# Patient Record
Sex: FEMALE | Race: BLACK OR AFRICAN AMERICAN | NOT HISPANIC OR LATINO | Employment: OTHER | URBAN - METROPOLITAN AREA
[De-identification: names, ages, dates, MRNs, and addresses within clinical notes are randomized per-mention and may not be internally consistent; named-entity substitution may affect disease eponyms.]

---

## 2017-01-04 ENCOUNTER — OFFICE VISIT (OUTPATIENT)
Dept: INTERNAL MEDICINE | Facility: CLINIC | Age: 72
End: 2017-01-04
Payer: MEDICARE

## 2017-01-04 DIAGNOSIS — N18.6 TYPE 2 DIABETES MELLITUS WITH CHRONIC KIDNEY DISEASE ON CHRONIC DIALYSIS, WITHOUT LONG-TERM CURRENT USE OF INSULIN: ICD-10-CM

## 2017-01-04 DIAGNOSIS — I12.0: Primary | ICD-10-CM

## 2017-01-04 DIAGNOSIS — Z99.2 TYPE 2 DIABETES MELLITUS WITH CHRONIC KIDNEY DISEASE ON CHRONIC DIALYSIS, WITHOUT LONG-TERM CURRENT USE OF INSULIN: ICD-10-CM

## 2017-01-04 DIAGNOSIS — E11.22 TYPE 2 DIABETES MELLITUS WITH CHRONIC KIDNEY DISEASE ON CHRONIC DIALYSIS, WITHOUT LONG-TERM CURRENT USE OF INSULIN: ICD-10-CM

## 2017-01-04 PROCEDURE — 99999 PR PBB SHADOW E&M-EST. PATIENT-LVL IV: CPT | Mod: PBBFAC,,, | Performed by: INTERNAL MEDICINE

## 2017-01-04 PROCEDURE — 3066F NEPHROPATHY DOC TX: CPT | Mod: S$GLB,,, | Performed by: INTERNAL MEDICINE

## 2017-01-04 PROCEDURE — 1157F ADVNC CARE PLAN IN RCRD: CPT | Mod: S$GLB,,, | Performed by: INTERNAL MEDICINE

## 2017-01-04 PROCEDURE — 99499 UNLISTED E&M SERVICE: CPT | Mod: S$GLB,,, | Performed by: INTERNAL MEDICINE

## 2017-01-04 PROCEDURE — 1126F AMNT PAIN NOTED NONE PRSNT: CPT | Mod: S$GLB,,, | Performed by: INTERNAL MEDICINE

## 2017-01-04 PROCEDURE — 99215 OFFICE O/P EST HI 40 MIN: CPT | Mod: S$GLB,,, | Performed by: INTERNAL MEDICINE

## 2017-01-04 PROCEDURE — 1160F RVW MEDS BY RX/DR IN RCRD: CPT | Mod: S$GLB,,, | Performed by: INTERNAL MEDICINE

## 2017-01-04 PROCEDURE — 2022F DILAT RTA XM EVC RTNOPTHY: CPT | Mod: S$GLB,,, | Performed by: INTERNAL MEDICINE

## 2017-01-04 PROCEDURE — 3044F HG A1C LEVEL LT 7.0%: CPT | Mod: S$GLB,,, | Performed by: INTERNAL MEDICINE

## 2017-01-04 PROCEDURE — 1159F MED LIST DOCD IN RCRD: CPT | Mod: S$GLB,,, | Performed by: INTERNAL MEDICINE

## 2017-01-04 NOTE — MR AVS SNAPSHOT
[facility] - Internal Medicine  [address]  [facility] [address]  Phone: [phone]  Fax: [phone]                  Beth Salcedo   2017 1:00 PM   Office Visit    Description:  Female : 1945   Provider:  Emili Sanchez MD   Department:  [facility] - Internal Medicine           Reason for Visit     Follow-up           Diagnoses this Visit        Comments    Unspecified hypertensive kidney disease with chronic kidney disease stage V or end stage renal disease    -  Primary            To Do List           Goals (5 Years of Data)     None      [facility]     [facility] Care [facility] -  Assistance  Registered nurses in the [facility] On Call Center provide clinical advisement, health education, appointment booking, and other advisory services.  Call for this free service at [phone].             Medications           Message regarding Medications     Verify the changes and/or additions to your medication regime listed below are the same as discussed with your clinician today.  If any of these changes or additions are incorrect, please notify your healthcare provider.             Verify that the below list of medications is an accurate representation of the medications you are currently taking.  If none reported, the list may be blank. If incorrect, please contact your healthcare provider. Carry this list with you in case of emergency.           Current Medications     amlodipine (NORVASC) 10 MG tablet TAKE ONE DAILY ***GENERIC NORVASC    aspirin 81 mg Tab Take 1 tablet by mouth Daily.    blood sugar diagnostic Strp Test 3 times daily. Please dispense 1 meter covered by insurance. Meter, Strips, and lancets    calcitRIOL (ROCALTROL) 0.25 MCG Cap Take 1 capsule (0.25 mcg total) by mouth once daily.    calcium carbonate (OS-HILLARY) 500 mg calcium (1,250 mg) tablet Take 1 tablet (500 mg total) by mouth 3 (three) times daily with meals.    carvedilol (COREG) 12.5 MG  tablet Take 1 tablet (12.5 mg total) by mouth 2 (two) times daily.    isosorbide mononitrate (IMDUR) 30 MG 24 hr tablet Take 1 tablet (30 mg total) by mouth once daily.    sevelamer carbonate (RENVELA) 800 mg Tab Take 1 tablet (800 mg total) by mouth 3 (three) times daily with meals.    sodium bicarbonate 650 MG tablet Take 2 tablets (1,300 mg total) by mouth 2 (two) times daily.    venlafaxine (EFFEXOR) 25 MG Tab Take 1 tablet (25 mg total) by mouth once daily.    VITAMIN D2 50,000 unit capsule ONE EVERY WEEK    ZETIA 10 mg tablet TAKE ONE DAILY OR AS DIRECTED    cloNIDine (CATAPRES) 0.1 MG tablet Take 1 tablet (0.1 mg total) by mouth 3 (three) times daily.    furosemide (LASIX) 20 MG tablet Take 1 tablet (20 mg total) by mouth once daily.           Clinical Reference Information           Vital Signs - Last Recorded  Most recent update: 1/4/2017  1:11 PM by Bakari Hinojosa MA    BP Pulse Ht Wt LMP BMI    (!) 160/50 (!) 58 5' (1.524 m) 59 kg (130 lb) (LMP Unknown) 25.39 kg/m2      Blood Pressure          Most Recent Value    BP  (!)  160/50      Allergies as of 1/4/2017     Hydralazine    Ramipril    Simvastatin      Immunizations Administered on Date of Encounter - 1/4/2017     None      Orders Placed During Today's Visit      Normal Orders This Visit    Ambulatory Referral to Cardiology       Maintenance Dialysis History     Start End Type Comments Center    6/10/2016  [place]  [facility]            Current Dialysis Center Information     [facility] [address] Phone #:  [phone]    Contact:  N/A KARINA Krishnamurthy  [id number] Fax #:  [phone]

## 2017-01-08 VITALS
SYSTOLIC BLOOD PRESSURE: 138 MMHG | BODY MASS INDEX: 25.52 KG/M2 | HEIGHT: 60 IN | HEART RATE: 64 BPM | WEIGHT: 130 LBS | DIASTOLIC BLOOD PRESSURE: 82 MMHG

## 2017-01-08 NOTE — PROGRESS NOTES
Subjective:       Patient ID: Beth Salcedo is a 71 y.o. female.    Chief Complaint: Hypertension    HPI: She returns for management of hypertension.  She has had hypertension for over a year.  Current treatment has included medications outlined in medication list.  She denies chest pain or shortness of breath.  No palpitations.  Denies left arm or neck pain.  She has diabetes.  Denies polyuria, polydipsia  Review of Systems   Constitutional: Negative for chills, fatigue, fever and unexpected weight change.   Respiratory: Negative for chest tightness and shortness of breath.    Cardiovascular: Negative for chest pain and palpitations.   Gastrointestinal: Negative for abdominal pain and blood in stool.   Neurological: Negative for dizziness, syncope, numbness and headaches.       Objective:      Physical Exam   HENT:   Right Ear: External ear normal.   Left Ear: External ear normal.   Nose: Nose normal.   Mouth/Throat: Oropharynx is clear and moist.   Eyes: Pupils are equal, round, and reactive to light.   Neck: Normal range of motion.   Cardiovascular: Normal rate and regular rhythm.    No murmur heard.  Pulmonary/Chest: Breath sounds normal.   Abdominal: She exhibits no distension. There is no hepatosplenomegaly. There is no tenderness.   Lymphadenopathy:     She has no cervical adenopathy.     She has no axillary adenopathy.   Neurological: She has normal strength and normal reflexes. No cranial nerve deficit or sensory deficit.       Assessment:     assessment and plan: 1.  Hypertension: She reports having her eye exam within the past year  2.  Diabetes: Discussed podiatry appointment.  She refused  3.  Discussed colonoscopy, Pap smear, pelvic exam, rectal exam.  She refused all      Plan:       As above

## 2017-01-10 PROBLEM — E87.79 HYPERTENSION WITH HYPERVOLEMIA: Status: ACTIVE | Noted: 2017-01-10

## 2017-01-10 PROBLEM — I10 HYPERTENSION WITH HYPERVOLEMIA: Status: ACTIVE | Noted: 2017-01-10

## 2017-01-10 RX ORDER — AMLODIPINE BESYLATE 10 MG/1
TABLET ORAL
Qty: 30 TABLET | Refills: 6 | Status: SHIPPED | OUTPATIENT
Start: 2017-01-10 | End: 2017-10-03 | Stop reason: SDUPTHER

## 2017-01-15 PROBLEM — I10 HYPERTENSION WITH HYPERVOLEMIA: Status: ACTIVE | Noted: 2017-01-15

## 2017-01-15 PROBLEM — E87.79 HYPERTENSION WITH HYPERVOLEMIA: Status: ACTIVE | Noted: 2017-01-15

## 2017-01-20 ENCOUNTER — TELEPHONE (OUTPATIENT)
Dept: VASCULAR SURGERY | Facility: CLINIC | Age: 72
End: 2017-01-20

## 2017-01-20 NOTE — TELEPHONE ENCOUNTER
----- Message from Camron Duvall sent at 1/20/2017 10:03 AM CST -----  Pt needs appt and would like it for next Friday if possible. Please call pt regarding this at [phone]

## 2017-01-27 ENCOUNTER — TELEPHONE (OUTPATIENT)
Dept: VASCULAR SURGERY | Facility: CLINIC | Age: 72
End: 2017-01-27

## 2017-01-27 NOTE — TELEPHONE ENCOUNTER
----- Message from Camron Duvall sent at 1/27/2017 10:25 AM CST -----  Pt was unaware she missed phone call to schedule appt. Please call pt to schedule appt at [phone]

## 2017-01-30 ENCOUNTER — OFFICE VISIT (OUTPATIENT)
Dept: CARDIOLOGY | Facility: CLINIC | Age: 72
End: 2017-01-30
Payer: MEDICARE

## 2017-01-30 ENCOUNTER — TELEPHONE (OUTPATIENT)
Dept: CARDIOLOGY | Facility: CLINIC | Age: 72
End: 2017-01-30

## 2017-01-30 ENCOUNTER — TELEPHONE (OUTPATIENT)
Dept: PHARMACY | Facility: CLINIC | Age: 72
End: 2017-01-30

## 2017-01-30 VITALS
SYSTOLIC BLOOD PRESSURE: 192 MMHG | DIASTOLIC BLOOD PRESSURE: 84 MMHG | HEART RATE: 64 BPM | WEIGHT: 131.38 LBS | HEIGHT: 60 IN | BODY MASS INDEX: 25.79 KG/M2

## 2017-01-30 DIAGNOSIS — N18.6 TYPE 2 DIABETES MELLITUS WITH CHRONIC KIDNEY DISEASE ON CHRONIC DIALYSIS, WITHOUT LONG-TERM CURRENT USE OF INSULIN: ICD-10-CM

## 2017-01-30 DIAGNOSIS — Z99.2 ESRD (END STAGE RENAL DISEASE) ON DIALYSIS: Chronic | ICD-10-CM

## 2017-01-30 DIAGNOSIS — I51.7 LVH (LEFT VENTRICULAR HYPERTROPHY): ICD-10-CM

## 2017-01-30 DIAGNOSIS — I50.32 CHRONIC DIASTOLIC HEART FAILURE: Chronic | ICD-10-CM

## 2017-01-30 DIAGNOSIS — N18.5 ANEMIA OF CHRONIC RENAL FAILURE, STAGE 5: Chronic | ICD-10-CM

## 2017-01-30 DIAGNOSIS — N18.6 ESRD (END STAGE RENAL DISEASE) ON DIALYSIS: Chronic | ICD-10-CM

## 2017-01-30 DIAGNOSIS — D63.1 ANEMIA OF CHRONIC RENAL FAILURE, STAGE 5: Chronic | ICD-10-CM

## 2017-01-30 DIAGNOSIS — Z99.2 TYPE 2 DIABETES MELLITUS WITH CHRONIC KIDNEY DISEASE ON CHRONIC DIALYSIS, WITHOUT LONG-TERM CURRENT USE OF INSULIN: ICD-10-CM

## 2017-01-30 DIAGNOSIS — E11.22 TYPE 2 DIABETES MELLITUS WITH CHRONIC KIDNEY DISEASE ON CHRONIC DIALYSIS, WITHOUT LONG-TERM CURRENT USE OF INSULIN: ICD-10-CM

## 2017-01-30 DIAGNOSIS — E78.2 MIXED HYPERLIPIDEMIA: ICD-10-CM

## 2017-01-30 DIAGNOSIS — I70.0 ATHEROSCLEROSIS OF AORTA: ICD-10-CM

## 2017-01-30 DIAGNOSIS — I10 ESSENTIAL HYPERTENSION: Primary | Chronic | ICD-10-CM

## 2017-01-30 PROCEDURE — 3044F HG A1C LEVEL LT 7.0%: CPT | Mod: S$GLB,,, | Performed by: INTERNAL MEDICINE

## 2017-01-30 PROCEDURE — 2022F DILAT RTA XM EVC RTNOPTHY: CPT | Mod: S$GLB,,, | Performed by: INTERNAL MEDICINE

## 2017-01-30 PROCEDURE — 1160F RVW MEDS BY RX/DR IN RCRD: CPT | Mod: S$GLB,,, | Performed by: INTERNAL MEDICINE

## 2017-01-30 PROCEDURE — 99499 UNLISTED E&M SERVICE: CPT | Mod: S$GLB,,, | Performed by: INTERNAL MEDICINE

## 2017-01-30 PROCEDURE — 99214 OFFICE O/P EST MOD 30 MIN: CPT | Mod: S$GLB,,, | Performed by: INTERNAL MEDICINE

## 2017-01-30 PROCEDURE — 1159F MED LIST DOCD IN RCRD: CPT | Mod: S$GLB,,, | Performed by: INTERNAL MEDICINE

## 2017-01-30 PROCEDURE — 3066F NEPHROPATHY DOC TX: CPT | Mod: S$GLB,,, | Performed by: INTERNAL MEDICINE

## 2017-01-30 PROCEDURE — 1157F ADVNC CARE PLAN IN RCRD: CPT | Mod: S$GLB,,, | Performed by: INTERNAL MEDICINE

## 2017-01-30 PROCEDURE — 99999 PR PBB SHADOW E&M-EST. PATIENT-LVL III: CPT | Mod: PBBFAC,,, | Performed by: INTERNAL MEDICINE

## 2017-01-30 PROCEDURE — 1126F AMNT PAIN NOTED NONE PRSNT: CPT | Mod: S$GLB,,, | Performed by: INTERNAL MEDICINE

## 2017-01-30 RX ORDER — CLONIDINE 0.2 MG/24H
1 PATCH, EXTENDED RELEASE TRANSDERMAL
Qty: 4 PATCH | Refills: 11 | Status: SHIPPED | OUTPATIENT
Start: 2017-01-30 | End: 2017-07-05 | Stop reason: DRUGHIGH

## 2017-01-30 RX ORDER — LOSARTAN POTASSIUM 100 MG/1
1 TABLET ORAL DAILY
COMMUNITY
Start: 2017-01-10 | End: 2017-03-09 | Stop reason: SDUPTHER

## 2017-01-30 RX ORDER — FUROSEMIDE 80 MG/1
1 TABLET ORAL 2 TIMES DAILY
COMMUNITY
Start: 2017-01-24 | End: 2017-07-05

## 2017-01-30 NOTE — PROGRESS NOTES
"Subjective:   Patient ID:  Beth Salcedo is a 71 y.o. female who presents for follow up of Hypertension      HPI: Here for 4 month f/u.  She finds that her BP is much better at home (140-160 systolic) but gets "white coat syndrome" according to her.      She feels well and denies chest discomfort, MENDEZ, palpitations, PND/orthopnea, lightheadedness and syncope.    She's interested in home dialysis.    Sept 2016 HPI: Very pleasant woman with ESRD on HD who has difficult to control HTN. She has no history of CAD or MI though she does have hypertensive heart disease with severe LA enlargement.     BPs have mostly been high normal to about what she is today lately except for occasional spikes at the end of dialysis. This is managed with PRN clonidine.     She denies angina, new shortness of breath, lightheadedness, or syncope.    Patient Active Problem List   Diagnosis    Mixed hyperlipidemia    Cataract, bilateral    Type II diabetes mellitus with renal manifestations    Atherosclerosis of aorta    Anemia of chronic renal failure, stage 5    Hypertensive urgency    ESRD (end stage renal disease) on dialysis    Essential hypertension    Secondary hyperparathyroidism of renal origin    LVH (left ventricular hypertrophy)    Diastolic heart failure    Hemodialysis access, AV graft    Hypertension with hypervolemia       Current Outpatient Prescriptions   Medication Sig    amlodipine (NORVASC) 10 MG tablet TAKE ONE DAILY GENERIC NORVASC    aspirin 81 mg Tab Take 1 tablet by mouth Daily.    blood sugar diagnostic Strp Test 3 times daily. Please dispense 1 meter covered by insurance. Meter, Strips, and lancets    calcitRIOL (ROCALTROL) 0.25 MCG Cap Take 1 capsule (0.25 mcg total) by mouth once daily.    calcium carbonate (OS-HILLARY) 500 mg calcium (1,250 mg) tablet Take 1 tablet (500 mg total) by mouth 3 (three) times daily with meals.    carvedilol (COREG) 12.5 MG tablet Take 1 tablet (12.5 mg total) by " mouth 2 (two) times daily.    cloNIDine (CATAPRES) 0.1 MG tablet Take 1 tablet (0.1 mg total) by mouth 3 (three) times daily.    furosemide (LASIX) 80 MG tablet Take 1 tablet by mouth 2 (two) times daily.    isosorbide mononitrate (IMDUR) 30 MG 24 hr tablet Take 1 tablet (30 mg total) by mouth once daily.    losartan (COZAAR) 100 MG tablet Take 1 tablet by mouth once daily.    sevelamer carbonate (RENVELA) 800 mg Tab Take 1 tablet (800 mg total) by mouth 3 (three) times daily with meals.    venlafaxine (EFFEXOR) 25 MG Tab Take 1 tablet (25 mg total) by mouth once daily.    VITAMIN D2 50,000 unit capsule ONE EVERY WEEK (Patient taking differently: PT TAKEN EVERY TUES,THUR ,SAT)    sodium bicarbonate 650 MG tablet Take 2 tablets (1,300 mg total) by mouth 2 (two) times daily.     No current facility-administered medications for this visit.        Review of Systems   Constitution: Negative.   HENT: Negative.    Eyes: Negative.    Cardiovascular: Negative.  Negative for chest pain, dyspnea on exertion, near-syncope, orthopnea and palpitations.   Respiratory: Negative.  Negative for cough, hemoptysis and shortness of breath.    Endocrine: Negative.    Hematologic/Lymphatic: Negative.    Skin: Negative.    Musculoskeletal: Negative.    Gastrointestinal: Negative.    Genitourinary: Negative.    Neurological: Negative.    Psychiatric/Behavioral: Negative.      Objective:   Physical Exam   Constitutional: She is oriented to person, place, and time. She appears well-developed and well-nourished.   HENT:   Head: Normocephalic and atraumatic.   Mouth/Throat: Oropharynx is clear and moist.   Eyes: Conjunctivae and EOM are normal. No scleral icterus.   Neck: Normal range of motion. Neck supple. No JVD present.   Cardiovascular: Normal rate, regular rhythm, normal heart sounds and intact distal pulses.  Exam reveals no gallop and no friction rub.    No murmur heard.  Pulmonary/Chest: Effort normal and breath sounds normal.  She has no wheezes. She has no rales.   Abdominal: Soft. Bowel sounds are normal. She exhibits no distension. There is no tenderness.   Musculoskeletal: Normal range of motion. She exhibits no edema.   Neurological: She is alert and oriented to person, place, and time.   Skin: Skin is warm and dry. No rash noted. No erythema.   Psychiatric: She has a normal mood and affect. Her behavior is normal. Judgment and thought content normal.   Vitals reviewed.      Lab Results   Component Value Date    WBC 5.66 12/16/2016    HGB 9.3 (L) 12/16/2016    HCT 28.3 (L) 12/16/2016    MCV 98 12/16/2016     12/16/2016         Chemistry        Component Value Date/Time     12/16/2016 0439    K 5.0 12/16/2016 0439     12/16/2016 0439    CO2 24 12/16/2016 0439    BUN 36 (H) 12/16/2016 0439    CREATININE 4.8 (H) 12/16/2016 0439     (H) 12/16/2016 0439        Component Value Date/Time    CALCIUM 9.3 12/16/2016 0439    ALKPHOS 67 12/16/2016 0439    AST 11 12/16/2016 0439    ALT 16 12/16/2016 0439    BILITOT 0.4 12/16/2016 0439            Lab Results   Component Value Date    CHOL 294 (H) 08/19/2015    CHOL 342 (H) 10/04/2014    CHOL 173 03/30/2013     Lab Results   Component Value Date    HDL 42 08/19/2015    HDL 41 10/04/2014    HDL 41 03/30/2013     Lab Results   Component Value Date    LDLCALC 216.4 (H) 08/19/2015    LDLCALC 257.2 (H) 10/04/2014    LDLCALC 117.0 03/30/2013     Lab Results   Component Value Date    TRIG 178 (H) 08/19/2015    TRIG 219 (H) 10/04/2014    TRIG 74 03/30/2013     Lab Results   Component Value Date    CHOLHDL 14.3 (L) 08/19/2015    CHOLHDL 12.0 (L) 10/04/2014    CHOLHDL 23.7 03/30/2013       Lab Results   Component Value Date    TSH 3.860 12/14/2016       Lab Results   Component Value Date    HGBA1C 6.4 (H) 12/14/2016       Assessment:     1. Essential hypertension    2. Chronic diastolic heart failure    3. Mixed hyperlipidemia    4. Atherosclerosis of aorta    5. LVH (left  ventricular hypertrophy)    6. Anemia of chronic renal failure, stage 5    7. ESRD (end stage renal disease) on dialysis    8. Type 2 diabetes mellitus with chronic kidney disease on chronic dialysis, without long-term current use of insulin    9.      Statin intolerance - true statin intolerance    Plan:     Stop PO clonidine and start Clonidine 0.2 patch.    Start Repatha for markedly elevated LDL and true statin intolerance.    Otherwise, continue current medicines.    Diet/exercise goals reinforced.    F/U 4 months with lipid panel

## 2017-01-30 NOTE — TELEPHONE ENCOUNTER
Natalie with [facility] notified to remove Lasix 20mg, Zetia 10mg and Catapres 0.1mg tablet prescriptions from pt's profile. Pt no longer taking these medications.

## 2017-01-30 NOTE — MR AVS SNAPSHOT
[place] - Cardiology   [address]  [place] [address]  Phone: [phone]                  Beth Salcedo   2017 9:00 AM   Office Visit    Description:  Female : 1945   Provider:  Wilian Salas MD   Department:  [place] - Cardiology           Reason for Visit     Hypertension                To Do List           Future Appointments        Provider Department Dept Phone    2/3/2017 7:30 AM VASCULAR, LAB [facility] Vascular Laboratory [phone]    2/3/2017 9:05 AM CHARLI Rey III, MD [facility] - Vascular Surgery [phone]    2/3/2017 11:00 AM VASCULAR, LAB [facility] Vascular Laboratory [phone]      Goals (5 Years of Data)     None      [facility]     [facility] Care Line -  Assistance  Registered nurses in the [facility] On Call Center provide clinical advisement, health education, appointment booking, and other advisory services.  Call for this free service at [phone].             Medications           Message regarding Medications     Verify the changes and/or additions to your medication regime listed below are the same as discussed with your clinician today.  If any of these changes or additions are incorrect, please notify your healthcare provider.        STOP taking these medications     ZETIA 10 mg tablet TAKE ONE DAILY OR AS DIRECTED           Verify that the below list of medications is an accurate representation of the medications you are currently taking.  If none reported, the list may be blank. If incorrect, please contact your healthcare provider. Carry this list with you in case of emergency.           Current Medications     amlodipine (NORVASC) 10 MG tablet TAKE ONE DAILY ***GENERIC NORVASC    aspirin 81 mg Tab Take 1 tablet by mouth Daily.    blood sugar diagnostic Strp Test 3 times daily. Please dispense 1 meter covered by insurance. Meter, Strips, and lancets    calcitRIOL (ROCALTROL) 0.25 MCG Cap Take 1 capsule  (0.25 mcg total) by mouth once daily.    calcium carbonate (OS-HILLARY) 500 mg calcium (1,250 mg) tablet Take 1 tablet (500 mg total) by mouth 3 (three) times daily with meals.    carvedilol (COREG) 12.5 MG tablet Take 1 tablet (12.5 mg total) by mouth 2 (two) times daily.    cloNIDine (CATAPRES) 0.1 MG tablet Take 1 tablet (0.1 mg total) by mouth 3 (three) times daily.    furosemide (LASIX) 80 MG tablet Take 1 tablet by mouth 2 (two) times daily.    isosorbide mononitrate (IMDUR) 30 MG 24 hr tablet Take 1 tablet (30 mg total) by mouth once daily.    losartan (COZAAR) 100 MG tablet Take 1 tablet by mouth once daily.    sevelamer carbonate (RENVELA) 800 mg Tab Take 1 tablet (800 mg total) by mouth 3 (three) times daily with meals.    sodium bicarbonate 650 MG tablet Take 2 tablets (1,300 mg total) by mouth 2 (two) times daily.    venlafaxine (EFFEXOR) 25 MG Tab Take 1 tablet (25 mg total) by mouth once daily.    VITAMIN D2 50,000 unit capsule ONE EVERY WEEK           Clinical Reference Information           Vital Signs - Last Recorded  Most recent update: 1/30/2017  9:16 AM by Mirian Weir LPN    BP Pulse Ht Wt LMP BMI    (!) 192/84 64 5' (1.524 m) 59.6 kg (131 lb 6.3 oz) (LMP Unknown) 25.66 kg/m2      Blood Pressure          Most Recent Value    BP  (!)  192/84      Allergies as of 1/30/2017     Hydralazine    Ramipril    Simvastatin      Immunizations Administered on Date of Encounter - 1/30/2017     None      Maintenance Dialysis History     Start End Type Comments Center    6/10/2016  [place]  [facility]            Current Dialysis Center Information     [facility] [address] Phone #:  [phone]    Contact:  N/KARINA Carmen  [address] Fax #:  [phone]

## 2017-01-30 NOTE — LETTER
January 30, 2017      Emili Sanchez MD  [address]  [facility] [address]           [place] - Cardiology  [address] [place] [address]  Phone: [phone]          Patient: Beth Salcedo   MR Number: [id number]   YOB: 1945   Date of Visit: 1/30/2017       Dear Dr. Emili Sanchez:    Thank you for referring Beth Salcedo to me for evaluation. Attached you will find relevant portions of my assessment and plan of care.    If you have questions, please do not hesitate to call me. I look forward to following Beth Salcedo along with you.    Sincerely,    Wilian Salas MD    Enclosure  CC:  No Recipients    If you would like to receive this communication electronically, please contact [email or web address] or [phone] to request more information on [employer] access.    For providers and/or their staff who would like to refer a patient to [facility], please contact us through our one-stop-shop provider referral line, [facility], at [phone].    If you feel you have received this communication in error or would no longer like to receive these types of communications, please e-mail [email or web address]

## 2017-02-03 ENCOUNTER — HOSPITAL ENCOUNTER (OUTPATIENT)
Dept: VASCULAR SURGERY | Facility: CLINIC | Age: 72
Discharge: HOME | End: 2017-02-03
Attending: SURGERY
Payer: MEDICARE

## 2017-02-03 ENCOUNTER — OFFICE VISIT (OUTPATIENT)
Dept: VASCULAR SURGERY | Facility: CLINIC | Age: 72
End: 2017-02-03
Payer: MEDICARE

## 2017-02-03 VITALS
BODY MASS INDEX: 24.91 KG/M2 | SYSTOLIC BLOOD PRESSURE: 214 MMHG | DIASTOLIC BLOOD PRESSURE: 87 MMHG | HEART RATE: 58 BPM | TEMPERATURE: 98 F | WEIGHT: 126.88 LBS | HEIGHT: 60 IN

## 2017-02-03 DIAGNOSIS — I87.2 VENOUS INSUFFICIENCY OF BOTH LOWER EXTREMITIES: ICD-10-CM

## 2017-02-03 DIAGNOSIS — I77.9 PAOD (PERIPHERAL ARTERIAL OCCLUSIVE DISEASE): ICD-10-CM

## 2017-02-03 DIAGNOSIS — N18.6 ESRD (END STAGE RENAL DISEASE) ON DIALYSIS: Primary | Chronic | ICD-10-CM

## 2017-02-03 DIAGNOSIS — I70.0 ATHEROSCLEROSIS OF AORTA: ICD-10-CM

## 2017-02-03 DIAGNOSIS — Z99.2 ESRD (END STAGE RENAL DISEASE) ON DIALYSIS: Primary | Chronic | ICD-10-CM

## 2017-02-03 PROCEDURE — 99999 PR PBB SHADOW E&M-EST. PATIENT-LVL III: CPT | Mod: PBBFAC,,, | Performed by: SURGERY

## 2017-02-03 PROCEDURE — 1160F RVW MEDS BY RX/DR IN RCRD: CPT | Mod: S$GLB,,, | Performed by: SURGERY

## 2017-02-03 PROCEDURE — 99214 OFFICE O/P EST MOD 30 MIN: CPT | Mod: 24,S$GLB,, | Performed by: SURGERY

## 2017-02-03 PROCEDURE — 1126F AMNT PAIN NOTED NONE PRSNT: CPT | Mod: S$GLB,,, | Performed by: SURGERY

## 2017-02-03 PROCEDURE — 1157F ADVNC CARE PLAN IN RCRD: CPT | Mod: S$GLB,,, | Performed by: SURGERY

## 2017-02-03 PROCEDURE — 93923 UPR/LXTR ART STDY 3+ LVLS: CPT | Mod: S$GLB,,, | Performed by: SURGERY

## 2017-02-03 PROCEDURE — 99499 UNLISTED E&M SERVICE: CPT | Mod: S$GLB,,, | Performed by: SURGERY

## 2017-02-03 PROCEDURE — 1159F MED LIST DOCD IN RCRD: CPT | Mod: S$GLB,,, | Performed by: SURGERY

## 2017-02-03 PROCEDURE — 93970 EXTREMITY STUDY: CPT | Mod: S$GLB,,, | Performed by: SURGERY

## 2017-02-03 RX ORDER — LIDOCAINE HYDROCHLORIDE 10 MG/ML
1 INJECTION, SOLUTION EPIDURAL; INFILTRATION; INTRACAUDAL; PERINEURAL ONCE
Status: CANCELLED | OUTPATIENT
Start: 2017-02-03 | End: 2017-02-03

## 2017-02-03 NOTE — PROGRESS NOTES
See my prior notes; review of systems, family history and social history are   unchanged.    HISTORY OF PRESENT ILLNESS:  A 71-year-old female with end-stage renal disease,   dialyzes since June 2016, status post:  1.  Urgent ligation, left brachiocephalic graft, 11/10/2016, after placement of   this graft the day before, 11/09/2016.    This was ligated urgently because of severe steal.    She now returns for evaluation of femoral AV graft.  I believe she has a high   chance of having recurrent steal with the new graft in her other arm and thus I   think femoral graft is safer given her very, very small arteries.    PAST MEDICAL HISTORY:  1.  End-stage renal disease.  Dialyzing Tuesday, Thursday, Saturday.  2.  Anemia.  3.  Diabetes.  4.  Diastolic heart failure.  5.  Hypertension.  6.  Hyperlipidemia.    PAST SURGICAL HISTORY:  1.  Access surgery as above.  2.  Hysterectomy.    FAMILY HISTORY:  Positive for pneumonia.    SOCIAL HISTORY:  Nonsmoker.    MEDICATIONS:  Include aspirin.  See Epic for full list.    ALLERGIES:  Hydralazine, rifampin and simvastatin.    REVIEW OF SYSTEMS:  CARDIOVASCULAR:  Positive postprandial pain or DVT.  All other systems include eyes, ENT, respiratory, , musculoskeletal, breast,   psychiatric, lymph, allergy and immune are negative.    PHYSICAL EXAMINATION:  VITAL SIGNS:  See nursing note.  GENERAL:  She is hypertensive, not taken her antihypertensives this morning, in   no acute distress.  RESPIRATORY:  Normal effort.  Clear to auscultation.  CARDIAC:  Regular rate and rhythm, nondisplaced PMI, no murmur.  EXTREMITIES:  Bilateral lower extremities show 2+ femoral pulses.  There are no   scars in the femoral area.    IMAGING:  ABIs are artifactual because of medial calcinosis.  PVRs suggest   minimal occlusive disease.    Duplex of the common femoral artery and vein show no stenosis.  However, there   is scattered calcification of her arteries noted.    ASSESSMENT:  End-stage renal  disease, needs access.  She has had severe steal of   brachiocephalic graft in the arm and thus I believe there is a high risk for   recurrent steal if we put another graft there.    PLAN:  1.  Femoral AV graft, left versus right depending on immediate preoperative   duplex.  2.  General anesthesia.    The patient understands the risks and rationale of procedure and wishes to   proceed.    Case will be on 02/15/2017.      MARLEY  dd: 02/03/2017 09:28:57 (CST)  td: 02/04/2017 08:59:07 (CST)  Doc ID   #[id number]  Job ID #[id number]    CC:

## 2017-02-03 NOTE — MR AVS SNAPSHOT
[facility]  [address]  [facility] [address]  Phone: [phone]  Fax: [phone]                  Beth Salcedo   2/3/2017 9:05 AM   Office Visit    Description:  Female : 1945   Provider:  CHARLI Rey III, MD   Department:  [facility]           Reason for Visit     Follow-up           Diagnoses this Visit        Comments    ESRD (end stage renal disease) on dialysis    -  Primary            To Do List           Future Appointments        Provider Department Dept Phone    2/3/2017 11:00 AM VASCULAR, LAB [facility] [phone]      Goals (5 Years of Data)     None      [facility]     [facility] -  Assistance  Registered nurses in the [facility] provide clinical advisement, health education, appointment booking, and other advisory services.  Call for this free service at [phone].             Medications           Message regarding Medications     Verify the changes and/or additions to your medication regime listed below are the same as discussed with your clinician today.  If any of these changes or additions are incorrect, please notify your healthcare provider.             Verify that the below list of medications is an accurate representation of the medications you are currently taking.  If none reported, the list may be blank. If incorrect, please contact your healthcare provider. Carry this list with you in case of emergency.           Current Medications     amlodipine (NORVASC) 10 MG tablet TAKE ONE DAILY ***GENERIC NORVASC    aspirin 81 mg Tab Take 1 tablet by mouth Daily.    blood sugar diagnostic Strp Test 3 times daily. Please dispense 1 meter covered by insurance. Meter, Strips, and lancets    calcitRIOL (ROCALTROL) 0.25 MCG Cap Take 1 capsule (0.25 mcg total) by mouth once daily.    calcium carbonate (OS-HILLARY) 500 mg calcium (1,250 mg) tablet Take 1 tablet (500 mg  total) by mouth 3 (three) times daily with meals.    carvedilol (COREG) 12.5 MG tablet Take 1 tablet (12.5 mg total) by mouth 2 (two) times daily.    cloNIDine 0.2 mg/24 hr td ptwk (CATAPRES) 0.2 mg/24 hr Place 1 patch onto the skin every 7 days.    evolocumab (REPATHA SURECLICK) 140 mg/mL PnIj Inject 1 Syringe into the skin every 14 (fourteen) days.    furosemide (LASIX) 80 MG tablet Take 1 tablet by mouth 2 (two) times daily.    isosorbide mononitrate (IMDUR) 30 MG 24 hr tablet Take 1 tablet (30 mg total) by mouth once daily.    losartan (COZAAR) 100 MG tablet Take 1 tablet by mouth once daily.    sevelamer carbonate (RENVELA) 800 mg Tab Take 1 tablet (800 mg total) by mouth 3 (three) times daily with meals.    sodium bicarbonate 650 MG tablet Take 2 tablets (1,300 mg total) by mouth 2 (two) times daily.    venlafaxine (EFFEXOR) 25 MG Tab Take 1 tablet (25 mg total) by mouth once daily.    VITAMIN D2 50,000 unit capsule ONE EVERY WEEK           Clinical Reference Information           Your Vitals Were     BP Pulse Temp Height Weight Last Period    214/87 58 97.7 °F (36.5 °C) (Oral) 5' (1.524 m) 57.6 kg (126 lb 14.4 oz) (LMP Unknown)    BMI                24.78 kg/m2          Blood Pressure          Most Recent Value    BP  (!)  214/87      Allergies as of 2/3/2017     Hydralazine    Ramipril    Simvastatin      Immunizations Administered on Date of Encounter - 2/3/2017     None      Maintenance Dialysis History     Start End Type Comments Center    6/10/2016  [place]  [facility]            Current Dialysis Center Information     [facility] [address] Phone #:  [phone]    Contact:  N/A KARINA Krishnamurthy  [address] Fax #:  [phone]            Language Assistance Services     ATTENTION: Language assistance services are available, free of charge. Please call [phone].      ATENCIÓN: Si habla janañol, tiene a reyes disposición servicios gratuitos de asistencia lingüística. Llame  al [phone].     WILLIAN Ý: N?u b?n nói Ti?ng Vi?t, có các d?ch v? h? tr? ngôn ng? mi?n phí dành cho b?n. G?i s? [phone].         [facility] complies with applicable Federal civil rights laws and does not discriminate on the basis of race, color, national origin, age, disability, or sex.

## 2017-02-17 ENCOUNTER — TELEPHONE (OUTPATIENT)
Dept: VASCULAR SURGERY | Facility: CLINIC | Age: 72
End: 2017-02-17

## 2017-02-17 ENCOUNTER — ANESTHESIA EVENT (OUTPATIENT)
Dept: SURGERY | Facility: HOSPITAL | Age: 72
End: 2017-02-17
Payer: MEDICARE

## 2017-02-19 NOTE — ANESTHESIA PREPROCEDURE EVALUATION
"                                                                                                             02/19/2017  Beth Salcedo is a 71 y.o., female with PMHx of HTN (poorly controlled, multiple antihypertensives, asymptomatic), DM2, HLD, ESRD on HD (TThSat, last HD session Sat), who presents for following procedure.     K+ 4.7    Pre-operative evaluation for Procedure(s) (LRB):  [id number] Femoral - Right vs Left (N/A)    Beth Salcedo is a 71 y.o. female     Patient Active Problem List   Diagnosis    Mixed hyperlipidemia    Cataract, bilateral    Type II diabetes mellitus with renal manifestations    Atherosclerosis of aorta    Anemia of chronic renal failure, stage 5    Hypertensive urgency    ESRD (end stage renal disease) on dialysis    Essential hypertension    Secondary hyperparathyroidism of renal origin    LVH (left ventricular hypertrophy)    Diastolic heart failure    Hemodialysis access, AV graft    Hypertension with hypervolemia    Venous insufficiency of both lower extremities    PAOD (peripheral arterial occlusive disease)       Review of patient's allergies indicates:   Allergen Reactions    Hydralazine Other (See Comments)     Pt. states "tiredness."      Ramipril      Other reaction(s): deathly ill  Other reaction(s): deathly ill    Simvastatin      Other reaction(s): deathly ill  Other reaction(s): deathly ill           Past Surgical History   Procedure Laterality Date    Hysterectomy  1990s     removed for tumor which she states was benign     Adrenal gland surgery  1990s     removed for tumor which she states was benign     Permcath placement         Social History     Social History    Marital status:      Spouse name: N/A    Number of children: N/A    Years of education: N/A     Occupational History    Not on file.     Social History Main Topics    Smoking status: Never Smoker    Smokeless tobacco: Never Used    Alcohol " use No      Comment: quit since she started dialysis; previously was only having wine occasionally and in her younger years would have whiskey/soda, etc    Drug use: No    Sexual activity: Not on file     Other Topics Concern    Not on file     Social History Narrative    Lives with daughter (born in 1964)    Owns and operates day care business     Previously was a           Vital Signs Range (Last 24H):             Diagnostic Studies:      EKG:  Normal sinus rhythm  Possible Left atrial enlargement  Left anterior fascicular block  LVH  Abnormal ECG  When compared with ECG of 27-SEP-2016 19:23,  T wave inversion no longer evident in Inferior leads  Nonspecific T wave abnormality, improved in Lateral leads  Confirmed by ELIZABETH YANG MD ([id number]) on 12/13/2016 8:46:46 AM    2D Echo:  CONCLUSIONS     1 - Concentric LVH with normal left ventricular systolic function (EF 60-65%).     2 - Severe left atrial enlargement.     3 - Left ventricular diastolic dysfunction.     4 - Normal right ventricular systolic function .     5 - Bilateral pleural effusion.       [facility] Anesthesia Evaluation    I have reviewed the Patient Summary Reports.    I have reviewed the Nursing Notes.   I have reviewed the Medications.     Review of Systems  Anesthesia Hx:  No problems with previous Anesthesia  Denies Family Hx of Anesthesia complications.   Denies Personal Hx of Anesthesia complications.   Hematology/Oncology:  Hematology Normal   Oncology Normal     EENT/Dental:EENT/Dental Normal   Cardiovascular:   Exercise tolerance: good Hypertension, poorly controlled CHF ECG has been reviewed. CONCLUSIONS     1 - Concentric LVH with normal left ventricular systolic function (EF 60-65%).     2 - Severe left atrial enlargement.     3 - Left ventricular diastolic dysfunction.     4 - Normal right ventricular systolic function .     5 - Bilateral pleural effusion.    Functional Capacity good / => 4 METS    Pulmonary:  Pulmonary Normal     Renal/:   Chronic Renal Disease, ESRD, Dialysis    Hepatic/GI:  Hepatic/GI Normal    Musculoskeletal:  Musculoskeletal Normal    Neurological:  Neurology Normal    Endocrine:   Diabetes, type 2    Dermatological:  Skin Normal    Psych:  Psychiatric Normal           Physical Exam  General:  Well nourished    Airway/Jaw/Neck:  Airway Findings: Mouth Opening: Normal Tongue: Normal  General Airway Assessment: Adult  Mallampati: II  Improves to II with phonation.  Jaw/Neck Findings:  Neck ROM: Normal ROM     Eyes/Ears/Nose:  Eyes/Ears/Nose Findings:    Dental:  Dental Findings: In tact   Chest/Lungs:  Chest/Lungs Findings: Clear to auscultation, Normal Respiratory Rate     Heart/Vascular:  Heart Findings: Rate: Normal  Rhythm: Regular Rhythm  Sounds: Normal  Heart Murmur  Vascular Findings:  Dialysis Access: AV Fistula LUE     Abdomen:  Abdomen Findings: Normal    Musculoskeletal:  Musculoskeletal Findings: Normal   Skin:  Skin Findings: Normal    Mental Status:  Mental Status Findings:  Cooperative, Alert and Oriented, Anxious         Anesthesia Plan  Type of Anesthesia, risks & benefits discussed:  Anesthesia Type:  general, MAC  Patient's Preference: General/MAC  Intra-op Monitoring Plan: standard ASA monitors  Intra-op Monitoring Plan Comments:   Post Op Pain Control Plan:   Post Op Pain Control Plan Comments:   Induction:   IV  Beta Blocker:  Patient is on a Beta-Blocker and has received one dose within the past 24 hours (No further documentation required).       Informed Consent: Patient understands risks and agrees with Anesthesia plan.  Questions answered. Anesthesia consent signed with patient.  ASA Score: 4     Day of Surgery Review of History & Physical: I have interviewed and examined the patient. I have reviewed the patient's H&P dated:  There are no significant changes.  H&P update referred to the surgeon.     Anesthesia Plan Notes: Discussed anesthetic options, pt understands and agrees with  plan        Ready For Surgery From Anesthesia Perspective.

## 2017-02-20 ENCOUNTER — SURGERY (OUTPATIENT)
Age: 72
End: 2017-02-20

## 2017-02-20 ENCOUNTER — ANESTHESIA (OUTPATIENT)
Dept: SURGERY | Facility: HOSPITAL | Age: 72
End: 2017-02-20
Payer: MEDICARE

## 2017-02-20 ENCOUNTER — HOSPITAL ENCOUNTER (OUTPATIENT)
Facility: HOSPITAL | Age: 72
Discharge: HOME | End: 2017-02-20
Attending: SURGERY | Admitting: SURGERY
Payer: MEDICARE

## 2017-02-20 DIAGNOSIS — N18.6 ESRD (END STAGE RENAL DISEASE) ON DIALYSIS: Chronic | ICD-10-CM

## 2017-02-20 DIAGNOSIS — Z99.2 ESRD (END STAGE RENAL DISEASE) ON DIALYSIS: Chronic | ICD-10-CM

## 2017-02-20 LAB
GLUCOSE SERPL-MCNC: 121 MG/DL (ref 70–110)
HCO3 UR-SCNC: 18.9 MMOL/L (ref 24–28)
HCT VFR BLD CALC: 36 %PCV (ref 36–54)
PCO2 BLDA: 32.4 MMHG (ref 35–45)
PH SMN: 7.37 [PH] (ref 7.35–7.45)
PO2 BLDA: 35 MMHG (ref 80–100)
POC BE: -6 MMOL/L
POC IONIZED CALCIUM: 1.13 MMOL/L (ref 1.06–1.42)
POC SATURATED O2: 67 % (ref 95–100)
POC TCO2: 20 MMOL/L (ref 23–27)
POCT GLUCOSE: 130 MG/DL (ref 70–110)
POTASSIUM BLD-SCNC: 4.7 MMOL/L (ref 3.5–5.1)
SAMPLE: ABNORMAL
SODIUM BLD-SCNC: 137 MMOL/L (ref 136–145)

## 2017-02-20 PROCEDURE — 25000003 PHARM REV CODE 250: Performed by: STUDENT IN AN ORGANIZED HEALTH CARE EDUCATION/TRAINING PROGRAM

## 2017-02-20 PROCEDURE — 27000221 HC OXYGEN, UP TO 24 HOURS

## 2017-02-20 PROCEDURE — 36000707: Performed by: SURGERY

## 2017-02-20 PROCEDURE — 63600175 PHARM REV CODE 636 W HCPCS: Performed by: ANESTHESIOLOGY

## 2017-02-20 PROCEDURE — 36000706: Performed by: SURGERY

## 2017-02-20 PROCEDURE — 82962 GLUCOSE BLOOD TEST: CPT | Performed by: SURGERY

## 2017-02-20 PROCEDURE — 63600175 PHARM REV CODE 636 W HCPCS: Performed by: STUDENT IN AN ORGANIZED HEALTH CARE EDUCATION/TRAINING PROGRAM

## 2017-02-20 PROCEDURE — D9220A PRA ANESTHESIA: Mod: ,,, | Performed by: ANESTHESIOLOGY

## 2017-02-20 PROCEDURE — 37000009 HC ANESTHESIA EA ADD 15 MINS: Performed by: SURGERY

## 2017-02-20 PROCEDURE — 63600175 PHARM REV CODE 636 W HCPCS

## 2017-02-20 PROCEDURE — 27200651 HC AIRWAY, LMA: Performed by: STUDENT IN AN ORGANIZED HEALTH CARE EDUCATION/TRAINING PROGRAM

## 2017-02-20 PROCEDURE — 36830 ARTERY-VEIN NONAUTOGRAFT: CPT | Mod: ,,, | Performed by: SURGERY

## 2017-02-20 PROCEDURE — 71000039 HC RECOVERY, EACH ADD'L HOUR: Performed by: SURGERY

## 2017-02-20 PROCEDURE — 71000015 HC POSTOP RECOV 1ST HR: Performed by: SURGERY

## 2017-02-20 PROCEDURE — C1768 GRAFT, VASCULAR: HCPCS | Performed by: SURGERY

## 2017-02-20 PROCEDURE — 25000003 PHARM REV CODE 250: Performed by: SURGERY

## 2017-02-20 PROCEDURE — 71000033 HC RECOVERY, INTIAL HOUR: Performed by: SURGERY

## 2017-02-20 PROCEDURE — 37000008 HC ANESTHESIA 1ST 15 MINUTES: Performed by: SURGERY

## 2017-02-20 DEVICE — GRAFT STRETCH TW RING 4-7 45C: Type: IMPLANTABLE DEVICE | Site: GROIN | Status: FUNCTIONAL

## 2017-02-20 RX ORDER — LABETALOL HYDROCHLORIDE 5 MG/ML
10 INJECTION, SOLUTION INTRAVENOUS ONCE
Status: COMPLETED | OUTPATIENT
Start: 2017-02-20 | End: 2017-02-20

## 2017-02-20 RX ORDER — ETOMIDATE 2 MG/ML
INJECTION INTRAVENOUS
Status: DISCONTINUED | OUTPATIENT
Start: 2017-02-20 | End: 2017-02-20

## 2017-02-20 RX ORDER — LIDOCAINE HYDROCHLORIDE 10 MG/ML
1 INJECTION, SOLUTION EPIDURAL; INFILTRATION; INTRACAUDAL; PERINEURAL ONCE
Status: DISCONTINUED | OUTPATIENT
Start: 2017-02-20 | End: 2017-02-20 | Stop reason: HOSPADM

## 2017-02-20 RX ORDER — LABETALOL HYDROCHLORIDE 5 MG/ML
INJECTION, SOLUTION INTRAVENOUS
Status: DISCONTINUED
Start: 2017-02-20 | End: 2017-02-20 | Stop reason: HOSPADM

## 2017-02-20 RX ORDER — ROCURONIUM BROMIDE 10 MG/ML
INJECTION, SOLUTION INTRAVENOUS
Status: DISCONTINUED | OUTPATIENT
Start: 2017-02-20 | End: 2017-02-20

## 2017-02-20 RX ORDER — CEFAZOLIN SODIUM 1 G/3ML
INJECTION, POWDER, FOR SOLUTION INTRAMUSCULAR; INTRAVENOUS
Status: DISCONTINUED | OUTPATIENT
Start: 2017-02-20 | End: 2017-02-20

## 2017-02-20 RX ORDER — SODIUM CHLORIDE 0.9 % (FLUSH) 0.9 %
3 SYRINGE (ML) INJECTION
Status: DISCONTINUED | OUTPATIENT
Start: 2017-02-20 | End: 2017-02-20 | Stop reason: HOSPADM

## 2017-02-20 RX ORDER — NEOSTIGMINE METHYLSULFATE 1 MG/ML
INJECTION, SOLUTION INTRAVENOUS
Status: DISCONTINUED | OUTPATIENT
Start: 2017-02-20 | End: 2017-02-20

## 2017-02-20 RX ORDER — PROTAMINE SULFATE 10 MG/ML
INJECTION, SOLUTION INTRAVENOUS
Status: DISCONTINUED | OUTPATIENT
Start: 2017-02-20 | End: 2017-02-20

## 2017-02-20 RX ORDER — FENTANYL CITRATE 50 UG/ML
25 INJECTION, SOLUTION INTRAMUSCULAR; INTRAVENOUS EVERY 5 MIN PRN
Status: COMPLETED | OUTPATIENT
Start: 2017-02-20 | End: 2017-02-20

## 2017-02-20 RX ORDER — GLYCOPYRROLATE 0.2 MG/ML
INJECTION INTRAMUSCULAR; INTRAVENOUS
Status: DISCONTINUED | OUTPATIENT
Start: 2017-02-20 | End: 2017-02-20

## 2017-02-20 RX ORDER — MIDAZOLAM HYDROCHLORIDE 1 MG/ML
INJECTION, SOLUTION INTRAMUSCULAR; INTRAVENOUS
Status: DISCONTINUED | OUTPATIENT
Start: 2017-02-20 | End: 2017-02-20

## 2017-02-20 RX ORDER — ONDANSETRON 2 MG/ML
INJECTION INTRAMUSCULAR; INTRAVENOUS
Status: DISCONTINUED | OUTPATIENT
Start: 2017-02-20 | End: 2017-02-20

## 2017-02-20 RX ORDER — HYDROCODONE BITARTRATE AND ACETAMINOPHEN 5; 325 MG/1; MG/1
1 TABLET ORAL EVERY 4 HOURS PRN
Status: DISCONTINUED | OUTPATIENT
Start: 2017-02-20 | End: 2017-02-20 | Stop reason: HOSPADM

## 2017-02-20 RX ORDER — HEPARIN SODIUM 1000 [USP'U]/ML
INJECTION, SOLUTION INTRAVENOUS; SUBCUTANEOUS
Status: DISCONTINUED | OUTPATIENT
Start: 2017-02-20 | End: 2017-02-20 | Stop reason: HOSPADM

## 2017-02-20 RX ORDER — HEPARIN SODIUM 1000 [USP'U]/ML
INJECTION, SOLUTION INTRAVENOUS; SUBCUTANEOUS
Status: DISCONTINUED | OUTPATIENT
Start: 2017-02-20 | End: 2017-02-20

## 2017-02-20 RX ORDER — PROPOFOL 10 MG/ML
VIAL (ML) INTRAVENOUS
Status: DISCONTINUED | OUTPATIENT
Start: 2017-02-20 | End: 2017-02-20

## 2017-02-20 RX ORDER — HYDROMORPHONE HYDROCHLORIDE 1 MG/ML
0.2 INJECTION, SOLUTION INTRAMUSCULAR; INTRAVENOUS; SUBCUTANEOUS EVERY 5 MIN PRN
Status: DISCONTINUED | OUTPATIENT
Start: 2017-02-20 | End: 2017-02-20 | Stop reason: HOSPADM

## 2017-02-20 RX ORDER — LIDOCAINE HCL/PF 100 MG/5ML
SYRINGE (ML) INTRAVENOUS
Status: DISCONTINUED | OUTPATIENT
Start: 2017-02-20 | End: 2017-02-20

## 2017-02-20 RX ORDER — PHENYLEPHRINE HYDROCHLORIDE 10 MG/ML
INJECTION INTRAVENOUS
Status: DISCONTINUED | OUTPATIENT
Start: 2017-02-20 | End: 2017-02-20

## 2017-02-20 RX ORDER — SODIUM CHLORIDE 9 MG/ML
INJECTION, SOLUTION INTRAVENOUS CONTINUOUS
Status: DISCONTINUED | OUTPATIENT
Start: 2017-02-20 | End: 2017-02-20 | Stop reason: HOSPADM

## 2017-02-20 RX ORDER — HYDROCODONE BITARTRATE AND ACETAMINOPHEN 5; 325 MG/1; MG/1
TABLET ORAL
Status: DISCONTINUED
Start: 2017-02-20 | End: 2017-02-20 | Stop reason: HOSPADM

## 2017-02-20 RX ORDER — LABETALOL HYDROCHLORIDE 5 MG/ML
INJECTION, SOLUTION INTRAVENOUS
Status: DISCONTINUED | OUTPATIENT
Start: 2017-02-20 | End: 2017-02-20

## 2017-02-20 RX ORDER — ONDANSETRON 2 MG/ML
4 INJECTION INTRAMUSCULAR; INTRAVENOUS DAILY PRN
Status: DISCONTINUED | OUTPATIENT
Start: 2017-02-20 | End: 2017-02-20 | Stop reason: HOSPADM

## 2017-02-20 RX ORDER — SODIUM CHLORIDE 9 MG/ML
INJECTION, SOLUTION INTRAVENOUS CONTINUOUS PRN
Status: DISCONTINUED | OUTPATIENT
Start: 2017-02-20 | End: 2017-02-20

## 2017-02-20 RX ORDER — SUCCINYLCHOLINE CHLORIDE 20 MG/ML
INJECTION INTRAMUSCULAR; INTRAVENOUS
Status: DISCONTINUED | OUTPATIENT
Start: 2017-02-20 | End: 2017-02-20

## 2017-02-20 RX ORDER — FENTANYL CITRATE 50 UG/ML
INJECTION, SOLUTION INTRAMUSCULAR; INTRAVENOUS
Status: COMPLETED
Start: 2017-02-20 | End: 2017-02-20

## 2017-02-20 RX ORDER — KETAMINE HCL IN 0.9 % NACL 50 MG/5 ML
SYRINGE (ML) INTRAVENOUS
Status: DISCONTINUED | OUTPATIENT
Start: 2017-02-20 | End: 2017-02-20

## 2017-02-20 RX ORDER — AMLODIPINE BESYLATE 10 MG/1
10 TABLET ORAL DAILY
Status: DISCONTINUED | OUTPATIENT
Start: 2017-02-20 | End: 2017-02-20 | Stop reason: HOSPADM

## 2017-02-20 RX ORDER — ISOSORBIDE MONONITRATE 30 MG/1
30 TABLET, EXTENDED RELEASE ORAL DAILY
Status: DISCONTINUED | OUTPATIENT
Start: 2017-02-20 | End: 2017-02-20 | Stop reason: HOSPADM

## 2017-02-20 RX ORDER — FENTANYL CITRATE 50 UG/ML
INJECTION, SOLUTION INTRAMUSCULAR; INTRAVENOUS
Status: DISCONTINUED | OUTPATIENT
Start: 2017-02-20 | End: 2017-02-20

## 2017-02-20 RX ORDER — HYDROCODONE BITARTRATE AND ACETAMINOPHEN 5; 325 MG/1; MG/1
1 TABLET ORAL EVERY 4 HOURS PRN
Qty: 31 TABLET | Refills: 0 | Status: SHIPPED | OUTPATIENT
Start: 2017-02-20 | End: 2017-07-05

## 2017-02-20 RX ADMIN — PROTAMINE SULFATE 40 MG: 10 INJECTION, SOLUTION INTRAVENOUS at 09:02

## 2017-02-20 RX ADMIN — LABETALOL HYDROCHLORIDE 10 MG: 5 INJECTION, SOLUTION INTRAVENOUS at 10:02

## 2017-02-20 RX ADMIN — HEPARIN SODIUM 1000 UNITS: 1000 INJECTION, SOLUTION INTRAVENOUS; SUBCUTANEOUS at 08:02

## 2017-02-20 RX ADMIN — SODIUM CHLORIDE: 0.9 INJECTION, SOLUTION INTRAVENOUS at 07:02

## 2017-02-20 RX ADMIN — NEOSTIGMINE METHYLSULFATE 2 MG: 1 INJECTION INTRAVENOUS at 09:02

## 2017-02-20 RX ADMIN — FENTANYL CITRATE 25 MCG: 50 INJECTION INTRAMUSCULAR; INTRAVENOUS at 11:02

## 2017-02-20 RX ADMIN — FENTANYL CITRATE 25 MCG: 50 INJECTION INTRAMUSCULAR; INTRAVENOUS at 12:02

## 2017-02-20 RX ADMIN — MIDAZOLAM HYDROCHLORIDE 1 MG: 1 INJECTION, SOLUTION INTRAMUSCULAR; INTRAVENOUS at 07:02

## 2017-02-20 RX ADMIN — ROCURONIUM BROMIDE 5 MG: 10 INJECTION, SOLUTION INTRAVENOUS at 08:02

## 2017-02-20 RX ADMIN — CEFAZOLIN 2 G: 1 INJECTION, POWDER, FOR SOLUTION INTRAVENOUS at 07:02

## 2017-02-20 RX ADMIN — LABETALOL HYDROCHLORIDE 10 MG: 5 INJECTION, SOLUTION INTRAVENOUS at 08:02

## 2017-02-20 RX ADMIN — PROPOFOL 40 MG: 10 INJECTION, EMULSION INTRAVENOUS at 07:02

## 2017-02-20 RX ADMIN — GLYCOPYRROLATE 0.2 MG: 0.2 INJECTION, SOLUTION INTRAMUSCULAR; INTRAVENOUS at 09:02

## 2017-02-20 RX ADMIN — ONDANSETRON 4 MG: 2 INJECTION INTRAMUSCULAR; INTRAVENOUS at 09:02

## 2017-02-20 RX ADMIN — PHENYLEPHRINE HYDROCHLORIDE 100 MCG: 10 INJECTION INTRAVENOUS at 08:02

## 2017-02-20 RX ADMIN — PHENYLEPHRINE HYDROCHLORIDE 200 MCG: 10 INJECTION INTRAVENOUS at 08:02

## 2017-02-20 RX ADMIN — HEPARIN SODIUM 5000 UNITS: 1000 INJECTION, SOLUTION INTRAVENOUS; SUBCUTANEOUS at 08:02

## 2017-02-20 RX ADMIN — FENTANYL CITRATE 25 MCG: 50 INJECTION, SOLUTION INTRAMUSCULAR; INTRAVENOUS at 08:02

## 2017-02-20 RX ADMIN — FENTANYL CITRATE 100 MCG: 50 INJECTION, SOLUTION INTRAMUSCULAR; INTRAVENOUS at 07:02

## 2017-02-20 RX ADMIN — ROCURONIUM BROMIDE 5 MG: 10 INJECTION, SOLUTION INTRAVENOUS at 07:02

## 2017-02-20 RX ADMIN — PROPOFOL 40 MG: 10 INJECTION, EMULSION INTRAVENOUS at 08:02

## 2017-02-20 RX ADMIN — AMLODIPINE BESYLATE 10 MG: 10 TABLET ORAL at 10:02

## 2017-02-20 RX ADMIN — LIDOCAINE HYDROCHLORIDE 60 MG: 20 INJECTION, SOLUTION INTRAVENOUS at 07:02

## 2017-02-20 RX ADMIN — HYDROCODONE BITARTRATE AND ACETAMINOPHEN 1 TABLET: 5; 325 TABLET ORAL at 10:02

## 2017-02-20 RX ADMIN — ISOSORBIDE MONONITRATE 30 MG: 30 TABLET, EXTENDED RELEASE ORAL at 11:02

## 2017-02-20 RX ADMIN — FENTANYL CITRATE 25 MCG: 50 INJECTION, SOLUTION INTRAMUSCULAR; INTRAVENOUS at 09:02

## 2017-02-20 RX ADMIN — FENTANYL CITRATE 25 MCG: 50 INJECTION INTRAMUSCULAR; INTRAVENOUS at 01:02

## 2017-02-20 RX ADMIN — PHENYLEPHRINE HYDROCHLORIDE 100 MCG: 10 INJECTION INTRAVENOUS at 07:02

## 2017-02-20 RX ADMIN — SUCCINYLCHOLINE CHLORIDE 120 MG: 20 INJECTION, SOLUTION INTRAMUSCULAR; INTRAVENOUS at 07:02

## 2017-02-20 RX ADMIN — Medication 20 MG: at 08:02

## 2017-02-20 RX ADMIN — LABETALOL HYDROCHLORIDE 10 MG: 5 INJECTION, SOLUTION INTRAVENOUS at 07:02

## 2017-02-20 RX ADMIN — ETOMIDATE 18 MG: 2 INJECTION, SOLUTION INTRAVENOUS at 07:02

## 2017-02-20 NOTE — DISCHARGE INSTRUCTIONS
Arteriovenous (AV) Fistula for Dialysis  An AV fistula is a connection between an artery and a vein. For this procedure, an AV fistula is surgically created using an artery and a vein in your arm. (Your doctor will let you know if another site is to be used.) When the artery and vein are joined, blood flow increases from the artery into the vein. As a result, the vein enlarges over time. The enlarged vein provides easier access to the blood for dialysis (a treatment for kidney failure). This sheet explains the procedure and what to expect.     An AV fistula increases blood flow from the artery into the vein. Over time, the vein becomes stronger and enlarged.   Preparing for the Procedure  Prepare as you have been told. In addition:  · Tell your doctor about all medications you take. This includes over-the-counter drugs. It also includes herbs and other supplements. You may need to stop taking some or all of them before the procedure.  · Follow any directions youre given for not eating or drinking before the procedure.  · Do not allow anyone to draw blood from or take blood pressure on the arm that will have the fistula prior to the procedure.  The Day of the Procedure  The procedure takes about 1-2 hours. Youll likely go home the same day.  Before the procedure begins:  · An IV line is put into a vein in the arm or hand not being used for the procedure. This line supplies fluids and medications.  · To keep you free of pain during the procedure, youre given general anesthesia. This medication puts you into a state like deep sleep through the procedure. Or a nerve block may be used. This medication numbs the arm. With it, you may also be given medication that makes you relaxed and drowsy through the procedure.  During the procedure:  · The skin over your arm may be injected with numbing medication.  · One or more small incisions are then made through the numbed skin. This depends on the size of your arm and the  depth of the vein in your arm.  · The vein is attached to the selected artery.  · Any incisions made are then closed with sutures (stitches), staples, surgical glue, or strips of surgical tape.  After the procedure:  · Youll be asked to keep your arm raised (elevated) as often as possible for at least a week after the procedure.  · Youll be given medications to manage pain as needed.  · Your arm and hand will be checked to make sure blood is flowing through the fistula properly. The feeling of blood rushing through the fistula is called a thrill. It is somewhat similar to the purring of a cat. Youll be taught how to check for this feeling each day to make sure there are no problems with your fistula. Youll also be taught how to care for your fistula at home.  · When its time for you to leave the hospital, have an adult family member or friend ready to drive you home.  Recovering at Home  Once at home, follow all of the instructions youve been given. Be sure to:  · Take all medications as directed.  · Care for your incision as instructed.  · Check for signs of infection at the incision site (see below).  · Avoid heavy lifting and strenuous activities as directed.  · Monitor and care for your fistula as instructed.  · Do your hand and arm exercises as instructed. This usually involves squeezing a ball in your hand for a few minutes each hour.  Call Your Health Care Provider If You Have Any of the Following:  · Fever of 100.4°F or higher  · Signs of infection at the incision site, such as increased redness or swelling, warmth, worsening pain, bleeding, or foul-smelling drainage  · Lack of a thrill (you cant feel it)  · Pain or numbness in your fingers, hand, or arm  · Bleeding, redness, or warmth around your fistula  · Sudden bulging of the fistula (more than usual; a slight bulge is normal)   Follow-Up  Your health care provider will check your fistula within 1 to 2 weeks after the procedure. It will likely  take about 6 to 8 weeks for the fistula to enlarge enough to start dialysis. After that, make sure the fistula is checked each time you have dialysis. Your health care provider may also suggest checkups every 6 months.  Risks and Possible Complications Include:  · Failure of the fistula to work properly  · Long wait before the fistula is ready (up to 6 months)  · Coldness or numbness in the hand (due to blood flowing away from the hand and into the fistula)  · An unsightly bump under the skin (due to enlargement of the fistula)  · Prolonged bleeding from the fistula after dialysis  · Narrowing or weakening of the blood vessels used for the fistula  · Formation of blood clots in the blood vessels used for the fistula  · Risks of anesthesia or any other medications used during the procedure   Living with an AV Fistula  A problem, such as narrowing of the vein (stricture) or infection, can make the fistula unusable. If this happens, you may need other treatments to repair or make a new fistula. To protect your fistula, follow these and any other guidelines youre given:  · Check your fistula as often as your health care provider says. If you cant feel your thrill, let he or she know right away.  · Make sure your fistula is checked before each dialysis treatment.  · Dont let anyone draw blood from or take blood pressure on the arm that has the fistula.  · Wash your hands often and keep the area around your fistula clean.  · Dont sleep on the arm that has the fistula.  · Dont wear tight jewelry or a watch on the arm with your fistula.  · Protect your fistula from cuts, scrapes, or blows.  Date Last Reviewed: 11/26/2014  © 0286-9700 [employer]. [address] [place] [address], [place] [address]. All rights reserved. This information is not intended as a substitute for professional medical care. Always follow your healthcare professional's instructions.

## 2017-02-20 NOTE — BRIEF OP NOTE
[facility]  Brief Operative Note     SUMMARY     Surgery Date: 2/20/2017     Surgeon(s) and Role:     * Tg Hough MD - Resident - Assisting     * CHARLI Rey III, MD - Primary        Pre-op Diagnosis:  ESRD (end stage renal disease) on dialysis [N18.6, Z99.2]    Post-op Diagnosis:  Post-Op Diagnosis Codes:     * ESRD (end stage renal disease) on dialysis [N18.6, Z99.2]    PROCEDURES:    1. L femoral 4-7 taper PTFE AVG    Anesthesia: Regional    Description of the findings of the procedure: good thrill    Findings/Key Components: as above    Estimated Blood Loss: <10cc         Specimens:   Specimen     None          Discharge Note    SUMMARY     Admit Date: 2/20/2017    Discharge Date and Time: 2/20/17    Hospital Course (synopsis of major diagnoses, care, treatment, and services provided during the course of the hospital stay): successful outpatient procedure    Final Diagnosis: Post-Op Diagnosis Codes:     * ESRD (end stage renal disease) on dialysis [N18.6, Z99.2]    Disposition: home    Follow Up/Patient Instructions: Diet: renal  Act: ad jose  FU: 2 week with AVF duplex     Medications: pre-op + analgesic

## 2017-02-20 NOTE — TRANSFER OF CARE
Anesthesia Transfer of Care Note    Patient: Beth Salcedo    Procedure(s) Performed: Procedure(s) (LRB):  [id number] Femoral - Right vs Left (Left)    Patient location: PACU    Anesthesia Type: general    Transport from OR: Transported from OR on 6-10 L/min O2 by face mask with adequate spontaneous ventilation    Post pain: adequate analgesia    Post assessment: no apparent anesthetic complications    Post vital signs: stable    Level of consciousness: awake, alert and oriented    Nausea/Vomiting: no nausea/vomiting    Complications: none    Comments: Labetalol 10mg ordered post op      Last vitals:   Visit Vitals    BP (!) 228/93    Pulse 78    Temp 35.9 °C (96.7 °F) (Axillary)    Resp 16    Ht 5' (1.524 m)    Wt 57.6 kg (127 lb)    LMP  (LMP Unknown)    SpO2 98%    Breastfeeding No    BMI 24.8 kg/m2

## 2017-02-20 NOTE — IP AVS SNAPSHOT
[facility]  [address]  [facility] [address]  Phone: [phone]           Patient Discharge Instructions     Our goal is to set you up for success. This packet includes information on your condition, medications, and your home care. It will help you to care for yourself so you don't get sicker and need to go back to the hospital.     Please ask your nurse if you have any questions.        There are many details to remember when preparing to leave the hospital. Here is what you will need to do:    1. Take your medicine. If you are prescribed medications, review your Medication List in the following pages. You may have new medications to  at the pharmacy and others that you'll need to stop taking. Review the instructions for how and when to take your medications. Talk with your doctor or nurses if you are unsure of what to do.     2. Go to your follow-up appointments. Specific follow-up information is listed in the following pages. Your may be contacted by a transition nurse or clinical provider about future appointments. Be sure we have all of the phone numbers to reach you, if needed. Please contact your provider's office if you are unable to make an appointment.     3. Watch for warning signs. Your doctor or nurse will give you detailed warning signs to watch for and when to call for assistance. These instructions may also include educational information about your condition. If you experience any of warning signs to your health, call your doctor.               [facility]  Unless otherwise directed by your provider, please contact [facility], our nurse care line that is available for 24/7 assistance.     [phone] (toll-free)    Registered nurses in the [facility] provide clinical advisement, health education, appointment booking, and other advisory services.                    ** Verify the list of medication(s) below is accurate and up  to date. Carry this with you in case of emergency. If your medications have changed, please notify your healthcare provider.             Medication List      START taking these medications        Additional Info                      docusate sodium 50 MG capsule   Commonly known as:  COLACE   Refills:  0   Dose:  50 mg    Instructions:  Take 1 capsule (50 mg total) by mouth 2 (two) times daily.     Begin Date    AM    Noon    PM    Bedtime       hydrocodone-acetaminophen 5-325mg 5-325 mg per tablet   Commonly known as:  NORCO   Quantity:  31 tablet   Refills:  0   Dose:  1 tablet    Last time this was given:  1 tablet on 2/20/2017 10:11 AM   Instructions:  Take 1 tablet by mouth every 4 (four) hours as needed.     Begin Date    AM    Noon    PM    Bedtime         CHANGE how you take these medications        Additional Info                      VITAMIN D2 50,000 unit Cap   Quantity:  30 capsule   Refills:  6   What changed:  See the new instructions.   Generic drug:  ergocalciferol    Instructions:  ONE EVERY WEEK     Begin Date    AM    Noon    PM    Bedtime         CONTINUE taking these medications        Additional Info                      amlodipine 10 MG tablet   Commonly known as:  NORVASC   Quantity:  30 tablet   Refills:  6    Last time this was given:  10 mg on 2/20/2017 10:15 AM   Instructions:  TAKE ONE DAILY ***GENERIC NORVASC     Begin Date    AM    Noon    PM    Bedtime       aspirin 81 mg Tab   Refills:  0   Dose:  1 tablet    Instructions:  Take 1 tablet by mouth Daily.     Begin Date    AM    Noon    PM    Bedtime       blood sugar diagnostic Strp   Quantity:  150 each   Refills:  5    Instructions:  Test 3 times daily. Please dispense 1 meter covered by insurance. Meter, Strips, and lancets     Begin Date    AM    Noon    PM    Bedtime       calcitRIOL 0.25 MCG Cap   Commonly known as:  ROCALTROL   Quantity:  30 capsule   Refills:  1   Dose:  0.25 mcg    Instructions:  Take 1 capsule (0.25 mcg  total) by mouth once daily.     Begin Date    AM    Noon    PM    Bedtime       calcium carbonate 500 mg calcium (1,250 mg) tablet   Commonly known as:  OS-HILLARY   Refills:  0   Dose:  500 mg    Instructions:  Take 1 tablet (500 mg total) by mouth 3 (three) times daily with meals.     Begin Date    AM    Noon    PM    Bedtime       carvedilol 12.5 MG tablet   Commonly known as:  COREG   Quantity:  60 tablet   Refills:  11   Dose:  12.5 mg    Instructions:  Take 1 tablet (12.5 mg total) by mouth 2 (two) times daily.     Begin Date    AM    Noon    PM    Bedtime       cloNIDine 0.2 mg/24 hr td ptwk 0.2 mg/24 hr   Commonly known as:  CATAPRES   Quantity:  4 patch   Refills:  11   Dose:  1 patch    Instructions:  Place 1 patch onto the skin every 7 days.     Begin Date    AM    Noon    PM    Bedtime       evolocumab 140 mg/mL Pnij   Commonly known as:  REPATHA SURECLICK   Quantity:  6 Syringe   Refills:  5   Dose:  1 Syringe    Instructions:  Inject 1 Syringe into the skin every 14 (fourteen) days.     Begin Date    AM    Noon    PM    Bedtime       furosemide 80 MG tablet   Commonly known as:  LASIX   Refills:  0   Dose:  1 tablet    Instructions:  Take 1 tablet by mouth 2 (two) times daily.     Begin Date    AM    Noon    PM    Bedtime       isosorbide mononitrate 30 MG 24 hr tablet   Commonly known as:  IMDUR   Quantity:  30 tablet   Refills:  1   Dose:  30 mg    Last time this was given:  30 mg on 2/20/2017 11:20 AM   Instructions:  Take 1 tablet (30 mg total) by mouth once daily.     Begin Date    AM    Noon    PM    Bedtime       losartan 100 MG tablet   Commonly known as:  COZAAR   Refills:  0   Dose:  1 tablet    Instructions:  Take 1 tablet by mouth once daily.     Begin Date    AM    Noon    PM    Bedtime       sevelamer carbonate 800 mg Tab   Commonly known as:  RENVELA   Quantity:  90 tablet   Refills:  1   Dose:  800 mg    Instructions:  Take 1 tablet (800 mg total) by mouth 3 (three) times daily with meals.      Begin Date    AM    Noon    PM    Bedtime       sodium bicarbonate 650 MG tablet   Quantity:  120 tablet   Refills:  11   Dose:  1300 mg    Instructions:  Take 2 tablets (1,300 mg total) by mouth 2 (two) times daily.     Begin Date    AM    Noon    PM    Bedtime       venlafaxine 25 MG Tab   Commonly known as:  EFFEXOR   Quantity:  30 tablet   Refills:  3   Dose:  25 mg    Instructions:  Take 1 tablet (25 mg total) by mouth once daily.     Begin Date    AM    Noon    PM    Bedtime            Where to Get Your Medications      You can get these medications from any pharmacy     Bring a paper prescription for each of these medications     hydrocodone-acetaminophen 5-325mg 5-325 mg per tablet       You don't need a prescription for these medications     docusate sodium 50 MG capsule                  Please bring to all follow up appointments:    1. A copy of your discharge instructions.  2. All medicines you are currently taking in their original bottles.  3. Identification and insurance card.    Please arrive 15 minutes ahead of scheduled appointment time.    Please call 24 hours in advance if you must reschedule your appointment and/or time.        Follow-up Information     Follow up with CHARLI Rey Iii, MD In 2 weeks.    Specialty:  Vascular Surgery    Why:  with vas US    Contact information:    [address]  [facility] [address]  [phone]          Discharge Instructions     Future Orders    Activity as tolerated     Call MD for:  difficulty breathing or increased cough     Call MD for:  increased confusion or weakness     Call MD for:  persistent dizziness, light-headedness, or visual disturbances     Call MD for:  persistent nausea and vomiting or diarrhea     Call MD for:  redness, tenderness, or signs of infection (pain, swelling, redness, odor or green/yellow discharge around incision site)     Call MD for:  severe uncontrolled pain     Call MD for:  temperature >100.4     Call MD for:   worsening rash     Call MD for:     Comments:    Pain, weakness, or numbness in left foot    Diet general     Questions:    Total calories:      Fat restriction, if any:      Protein restriction, if any:      Na restriction, if any:      Fluid restriction:      Additional restrictions:      Other restrictions (specify):     Comments:    No driving while still taking pain medications daily.   Take a stool softener while taking pain medications daily.   Keep left groin dry with dry gauze over incision    VAS US Hemodialysis Access     Comments:    RLE AVG        Discharge Instructions         Arteriovenous (AV) Fistula for Dialysis  An AV fistula is a connection between an artery and a vein. For this procedure, an AV fistula is surgically created using an artery and a vein in your arm. (Your doctor will let you know if another site is to be used.) When the artery and vein are joined, blood flow increases from the artery into the vein. As a result, the vein enlarges over time. The enlarged vein provides easier access to the blood for dialysis (a treatment for kidney failure). This sheet explains the procedure and what to expect.     An AV fistula increases blood flow from the artery into the vein. Over time, the vein becomes stronger and enlarged.   Preparing for the Procedure  Prepare as you have been told. In addition:  · Tell your doctor about all medications you take. This includes over-the-counter drugs. It also includes herbs and other supplements. You may need to stop taking some or all of them before the procedure.  · Follow any directions youre given for not eating or drinking before the procedure.  · Do not allow anyone to draw blood from or take blood pressure on the arm that will have the fistula prior to the procedure.  The Day of the Procedure  The procedure takes about 1-2 hours. Youll likely go home the same day.  Before the procedure begins:  · An IV line is put into a vein in the arm or hand not  being used for the procedure. This line supplies fluids and medications.  · To keep you free of pain during the procedure, youre given general anesthesia. This medication puts you into a state like deep sleep through the procedure. Or a nerve block may be used. This medication numbs the arm. With it, you may also be given medication that makes you relaxed and drowsy through the procedure.  During the procedure:  · The skin over your arm may be injected with numbing medication.  · One or more small incisions are then made through the numbed skin. This depends on the size of your arm and the depth of the vein in your arm.  · The vein is attached to the selected artery.  · Any incisions made are then closed with sutures (stitches), staples, surgical glue, or strips of surgical tape.  After the procedure:  · Youll be asked to keep your arm raised (elevated) as often as possible for at least a week after the procedure.  · Youll be given medications to manage pain as needed.  · Your arm and hand will be checked to make sure blood is flowing through the fistula properly. The feeling of blood rushing through the fistula is called a thrill. It is somewhat similar to the purring of a cat. Youll be taught how to check for this feeling each day to make sure there are no problems with your fistula. Youll also be taught how to care for your fistula at home.  · When its time for you to leave the hospital, have an adult family member or friend ready to drive you home.  Recovering at Home  Once at home, follow all of the instructions youve been given. Be sure to:  · Take all medications as directed.  · Care for your incision as instructed.  · Check for signs of infection at the incision site (see below).  · Avoid heavy lifting and strenuous activities as directed.  · Monitor and care for your fistula as instructed.  · Do your hand and arm exercises as instructed. This usually involves squeezing a ball in your hand for a few  minutes each hour.  Call Your Health Care Provider If You Have Any of the Following:  · Fever of 100.4°F or higher  · Signs of infection at the incision site, such as increased redness or swelling, warmth, worsening pain, bleeding, or foul-smelling drainage  · Lack of a thrill (you cant feel it)  · Pain or numbness in your fingers, hand, or arm  · Bleeding, redness, or warmth around your fistula  · Sudden bulging of the fistula (more than usual; a slight bulge is normal)   Follow-Up  Your health care provider will check your fistula within 1 to 2 weeks after the procedure. It will likely take about 6 to 8 weeks for the fistula to enlarge enough to start dialysis. After that, make sure the fistula is checked each time you have dialysis. Your health care provider may also suggest checkups every 6 months.  Risks and Possible Complications Include:  · Failure of the fistula to work properly  · Long wait before the fistula is ready (up to 6 months)  · Coldness or numbness in the hand (due to blood flowing away from the hand and into the fistula)  · An unsightly bump under the skin (due to enlargement of the fistula)  · Prolonged bleeding from the fistula after dialysis  · Narrowing or weakening of the blood vessels used for the fistula  · Formation of blood clots in the blood vessels used for the fistula  · Risks of anesthesia or any other medications used during the procedure   Living with an AV Fistula  A problem, such as narrowing of the vein (stricture) or infection, can make the fistula unusable. If this happens, you may need other treatments to repair or make a new fistula. To protect your fistula, follow these and any other guidelines youre given:  · Check your fistula as often as your health care provider says. If you cant feel your thrill, let he or she know right away.  · Make sure your fistula is checked before each dialysis treatment.  · Dont let anyone draw blood from or take blood pressure on the arm  that has the fistula.  · Wash your hands often and keep the area around your fistula clean.  · Dont sleep on the arm that has the fistula.  · Dont wear tight jewelry or a watch on the arm with your fistula.  · Protect your fistula from cuts, scrapes, or blows.  Date Last Reviewed: 11/26/2014  © 7147-8047 [employer]. [address] [place] [address], [place] [address]. All rights reserved. This information is not intended as a substitute for professional medical care. Always follow your healthcare professional's instructions.            Admission Information     Date & Time Provider Department CSN    2/20/2017  6:18 AM CHARLI Rey III, MD [facility] [id number]      Care Providers     Provider Role Specialty Primary office phone    CHARLI Rey III, MD Attending Provider Vascular Surgery [phone]    CHARLI Rey III, MD Surgeon  Vascular Surgery [phone]      Your Vitals Were     BP Pulse Temp Resp Height Weight    171/76 66 98.1 °F (36.7 °C) (Skin) 16 5' (1.524 m) 57.6 kg (127 lb)    Last Period SpO2 BMI          (LMP Unknown) 98% 24.8 kg/m2        Recent Lab Values        3/22/2014 10/4/2014 8/19/2015 9/15/2015 1/18/2016 3/15/2016 6/5/2016 12/14/2016     11:30 AM 10:07 AM  9:40 AM 10:30 AM 11:25 AM  3:54 PM  4:33 AM  4:42 AM    A1C 5.8 5.7 6.7 (H) 6.5 (H) 5.6 5.3 5.2 6.4 (H)    Comment for A1C at  4:42 AM on 12/14/2016:  According to ADA guidelines, hemoglobin A1C <7.0% represents  optimal control in non-pregnant diabetic patients.  Different  metrics may apply to specific populations.   Standards of Medical Care in Diabetes - 2016.  For the purpose of screening for the presence of diabetes:  <5.7%     Consistent with the absence of diabetes  5.7-6.4%  Consistent with increasing risk for diabetes   (prediabetes)  >or=6.5%  Consistent with diabetes  Currently no consensus exists for use of hemoglobin A1C  for diagnosis of diabetes for children.        Allergies as of  "2/20/2017        Reactions    Hydralazine Other (See Comments)    Pt. states "tiredness."    Ramipril     Other reaction(s): deathly ill  Other reaction(s): deathly ill    Simvastatin     Other reaction(s): deathly ill  Other reaction(s): deathly ill      Advance Directives     An advance directive is a document which, in the event you are no longer able to make decisions for yourself, tells your healthcare team what kind of treatment you do or do not want to receive, or who you would like to make those decisions for you.  If you do not currently have an advance directive, [facility] encourages you to create one.  For more information call:  [phone]), [phone] ([phone]),  or log on to [email or web address].        Language Assistance Services     ATTENTION: Language assistance services are available, free of charge. Please call [phone].      ATENCIÓN: Si habla kayley, tiene a reyes disposición servicios gratuitos de asistencia lingüística. Llame al [phone].     [facility] Ý: N?u b?n nói Ti?ng Vi?t, có các d?ch v? h? tr? ngôn ng? mi?n phí dành cho b?n. G?i s? [phone].        Heart Failure Education       Heart Failure: Being Active  You have a condition called heart failure. Being active doesnt mean that you have to wear yourself out. Even a little movement each day helps to strengthen your heart. If you cant get out to exercise, you can do simple stretching and strengthening exercises at home. These are good ways to keep you well-conditioned and prevent you and your heart from becoming excessively weak.    Ideas to get you started  · Add a little movement to things you do now. Walk to mail letters. Park your car at the far end of the parking lot and walk to the store. Walk up a flight of stairs instead of taking the elevator.  · Choose activities you enjoy. You might walk, swim, or ride an exercise bike. Things like gardening and washing the car count, too. Other " possibilities include: washing dishes, walking the dog, walking around the mall, and doing aerobic activities with friends.  · Join a group exercise program at a [facility] or [facility], a senior center, or a community center. Or look into a hospital cardiac rehabilitation program. Ask your doctor if you qualify.  Tips to keep you going  · Get up and get dressed each day. Go to a coffee shop and read a newspaper or go somewhere that you'll be in the presence of other active people. Youll feel more like being active.  · Make a plan. Choose one or more activities that you enjoy and that you can easily do. Then plan to do at least one each day. You might write your plan on a calendar.  · Go with a friend or a group if you like company. This can help you feel supported and stay motivated, too.  · Plan social events that you enjoy. This will keep you mentally engaged as well as physically motivated to do things you find pleasure in.  For your safety  · Talk with your healthcare provider before starting an exercise program.  · Exercise indoors when its too hot or too cold outside, or when the air quality is poor. Try walking at a shopping mall.  · Wear socks and sturdy shoes to maintain your balance and prevent falls.  · Start slowly. Do a few minutes several times a day at first. Increase your time and speed little by little.  · Stop and rest whenever you feel tired or get short of breath.  · Dont push yourself on days when you dont feel well.  Date Last Reviewed: 3/20/2016  © 8320-4945 [employer]. [address] [place] [address], [place] [address]. All rights reserved. This information is not intended as a substitute for professional medical care. Always follow your healthcare professional's instructions.              Heart Failure: Evaluating Your Heart  You have a condition called heart failure. To evaluate your condition, your doctor will examine you, ask questions, and do some tests. Along with looking for signs of  heart failure, the doctor looks for any other health problems that may have led to heart failure. The results of your evaluation will help your doctor form a treatment plan.  Health history and physical exam  Your visit will start with a health history. Tell the doctor about any symptoms youve noticed and about all medicines you take. Then youll have a physical exam. This includes listening to your heartbeat and breathing. Youll also be checked for swelling (edema) in your legs and neck. When you have fluid buildup or fluid in the lungs, it may be called congestive heart failure.  Diagnosing heart failure     During an echocardiogram, sound waves bounce off the heart. These are converted into a picture on the screen.   The following may be done to help your doctor form a diagnosis:  · X-rays show the size and shape of your heart. These pictures can also show fluid in your lungs.  · An electrocardiogram (ECG or EKG) shows the pattern of your heartbeat. Small pads (electrodes) are placed on your chest, arms, and legs. Wires connect the pads to the ECG machine, which records your hearts electrical signals. This can give the doctor information about heart function.  · An echocardiogram uses ultrasound waves to show the structure and movement of your heart muscle. This shows how well the heart pumps. It also shows the thickness of the heart walls, and if the heart is enlarged. It is one of the most useful, non-invasive tests as it provides information about the heart's general function. This helps your doctor make treatment decisions.  · Lab tests evaluate small amounts of blood or urine for signs of problems. A BNP lab test can help diagnose and evaluate heart failure. BNP stands for B-type natriuretic peptide. The ventricles secrete more BNP when heart failure worsens. Lab tests can also provide information about metabolic dysfunction or heart dysfunction.  Your treatment plan  Based on the results of your  evaluation and tests, your doctor will develop a treatment plan. This plan is designed to relieve some of your heart failure symptoms and help make you more comfortable. Your treatment plan may include:  · Medicine to help your heart work better and improve your quality of life  · Changes in what you eat and drink to help prevent fluid from backing up in your body  · Daily monitoring of your weight and heart failure symptoms to see how well your treatment plan is working  · Exercise to help you stay healthy  · Help with quitting smoking  · Emotional and psychological support to help adjust to the changes  · Referrals to other specialists to make sure you are being treated comprehensively  Date Last Reviewed: 3/21/2016  © 9891-7486 [employer]. [address] [place] [address], [place] [address]. All rights reserved. This information is not intended as a substitute for professional medical care. Always follow your healthcare professional's instructions.              Heart Failure: Making Changes to Your Diet  You have a condition called heart failure. When you have heart failure, excess fluid is more likely to build up in your body because your heart isn't working well. This makes the heart work harder to pump blood. Fluid buildup causes symptoms such as shortness of breath and swelling (edema). This is often referred to as congestive heart failure or CHF. Controlling the amount of salt (sodium) you eat may help stop fluid from building up. Your doctor may also tell you to reduce the amount of fluid you drink.  Reading food labels    Your healthcare provider will tell you how much sodium you can eat each day. Read food labels to keep track. Keep in mind that certain foods are high in salt. These include canned, frozen, and processed foods. Check the amount of sodium in each serving. Watch out for high-sodium ingredients. These include MSG (monosodium glutamate), baking soda, and sodium phosphate.   Eating less  salt  Give yourself time to get used to eating less salt. It may take a little while. Here are some tips to help:  · Take the saltshaker off the table. Replace it with salt-free herb mixes and spices.  · Eat fresh or plain frozen vegetables. These have much less salt than canned vegetables.  · Choose low-sodium snacks like sodium-free pretzels, crackers, or air-popped popcorn.  · Dont add salt to your food when youre cooking. Instead, season your foods with pepper, lemon, garlic, or onion.  · When you eat out, ask that your food be cooked without added salt.  · Avoid eating fried foods as these often have a great deal of salt.  If youre told to limit fluids  You may need to limit how much fluid you have to help prevent swelling. This includes anything that is liquid at room temperature, such as ice cream and soup. If your doctor tells you to limit fluid, try these tips:  · Measure drinks in a measuring cup before you drink them. This will help you meet daily goals.  · Chill drinks to make them more refreshing.  · Suck on frozen lemon wedges to quench thirst.  · Only drink when youre thirsty.  · Chew sugarless gum or suck on hard candy to keep your mouth moist.  · Weigh yourself daily to know if your body's fluid content is rising.  My sodium goal  Your healthcare provider may give you a sodium goal to meet each day. This includes sodium found in food as well as salt that you add. My goal is to eat no more than ___________ mg of sodium per day.     When to call your doctor  Call your doctor right away if you have any symptoms of worsening heart failure. These can include:  · Sudden weight gain  · Increased swelling of your legs or ankles  · Trouble breathing when youre resting or at night  · Increase in the number of pillows you have to sleep on  · Chest pain, pressure, discomfort, or pain in the jaw, neck, or back   Date Last Reviewed: 3/21/2016  © 5185-6252 [employer]. [address] [place] [address],  JESSY Gates [address]. All rights reserved. This information is not intended as a substitute for professional medical care. Always follow your healthcare professional's instructions.              Heart Failure: Medicines to Help Your Heart    You have a condition called heart failure (also known as congestive heart failure, or CHF). Your doctor will likely prescribe medicines for heart failure and any underlying health problems you have. Most heart failure patients take one or more types of medicinen. Your healthcare provider will work to find the combination of medicines that works best for you.  Heart failure medicines  Here are the most common heart failure medicines:  · ACE inhibitors lower blood pressure and decrease strain on the heart. This makes it easier for the heart to pump. Angiotensin receptor blockers have similar effects. These are prescribed for some patients instead of ACE inhibitors.  · Beta-blockers relieve stress on the heart. They also improve symptoms. They may also improve the heart's pumping action over time.  · Diuretics (also called water pills) help rid your body of excess water. This can help rid your body of swelling (edema). Having less fluid to pump means your heart doesnt have to work as hard. Some diuretics make your body lose a mineral called potassium. Your doctor will tell you if you need to take supplements or eat more foods high in potassium.  · Digoxin helps your heart pump with more strength. This helps your heart pump more blood with each beat. So, more oxygen-rich blood travels to the rest of the body.  · Aldosterone antagonists help alter hormones and decrease strain on the heart.  · Hydralazine and nitrates are two separate medicines used together to treat heart failure. They may come in one combination pill. They lower blood pressure and decrease how hard the heart has to pump.  Medicines for related conditions  Controlling other heart problems helps keep heart failure  under control, too. Depending on other heart problems you have, medicines may be prescribed to:  · Lower blood pressure (antihypertensives).  · Lower cholesterol levels (statins).  · Prevent blood clots (anticoagulants or aspirin).  · Keep the heartbeat steady (antiarrhythmics).  Date Last Reviewed: 3/5/2016  © 3007-8053 [employer]. [address] [place] [address]. All rights reserved. This information is not intended as a substitute for professional medical care. Always follow your healthcare professional's instructions.              Heart Failure: Procedures That May Help    The heart is a muscle that pumps oxygen-rich blood to all parts of the body. When you have heart failure, the heart is not able to pump as well as it should. Blood and fluid may back up into the lungs (congestive heart failure), and some parts of the body dont get enough oxygen-rich blood to work normally. These problems lead to the symptoms of heart failure.     Certain procedures may help the heart pump better in some cases of heart failure. Some procedures are done to treat health problems that may have caused the heart failure such as coronary artery disease or heart rhythm problems. For more serious heart failure, other options are available.  Treating artery and valve problems  If you have coronary artery disease or valve disease, procedures may be done to improve blood flow. This helps the heart pump better, which can improve heart failure symptoms. First, your doctor may do a cardiac catheterization to help detect clogged blood vessels or valve damage. During this procedure, a  thin tube (catheter) in inserted into a blood vessel and guided to the heart. There a dye is injected and a special type of X-ray (angiogram) is taken of the blood vessels. Procedures to open a blocked artery or fix damaged valves can also be done using catheterization.  · Angioplasty uses a balloon-tipped instrument at the end of the  catheter. The balloon is inflated to widen the narrowed artery. In many cases, a stent is expanded to further support the narrowed artery. A stent is a metal mesh tube.  · Valve surgery repairs or replacement of faulty valves can also be done during catheterization so blood can flow properly through the chambers of the heart.  Bypass surgery is another option to help treat blocked arteries. It uses a healthy blood vessel from elsewhere in the body. The healthy blood vessel is attached above and below the blocked area so that blood can flow around the blocked artery.  Treating heart rhythm problems  A device may be placed in the chest to help a weak heart maintain a healthy, heartbeat so the heart can pump more effectively:  · Pacemaker. A pacemaker is an implanted device that regulates your heartbeat electronically. It monitors your heart's rhythm and generates a painless electric impulse that helps the heart beat in a regular rhythm. A pacemaker is programmed to meet your specific heart rhythm needs.  · Biventricular pacing/cardiac resynchronization therapy. A type of pacemaker that paces both pumping chambers of the heart at the same time to coordinate contractions and to improve the heart's function. Some people with heart failure are candidates for this therapy.  · Implantable cardioverter defibrillator. A device similar to a pacemaker that senses when the heart is beating too fast and delivers an electrical shock to convert the fast rhythm to a normal rhythm. This can be a life saving device.  In severe cases  In more serious cases of heart failure when other treatments no longer work, other options may include:  · Ventricular assist devices (VADs). These are mechanical devices used to take over the pumping function for one or both of the heart's ventricles, or pumping chambers. A VAD may be necessary when heart failure progresses to the point that medicines and other treatments no longer help. In some cases, a  VAD may be used as a bridge to transplant.  · Heart transplant. This is replacing the diseased heart with a healthy one from a donor. This is an option for a few people who are very sick. A heart transplant is very serious and not an option for all patients. Your doctor can tell you more.  Date Last Reviewed: 3/20/2016  © 9499-1959 [employer]. [address] [place] [address], [place] [address]. All rights reserved. This information is not intended as a substitute for professional medical care. Always follow your healthcare professional's instructions.              Heart Failure: Tracking Your Weight  You have a condition called heart failure. When you have heart failure, a sudden weight gain or a steady rise in weight is a warning sign that your body is retaining too much water and salt. This could mean your heart failure is getting worse. If left untreated, it can cause problems for your lungs and result in shortness of breath. Weighing yourself each day is the best way to know if youre retaining water. If your weight goes up quickly, call your doctor. You will be given instructions on how to get rid of the excess water. You will likely need medicines and to avoid salt. This will help your heart work better.  Call your doctor if you gain more than 2 pounds in 1 day, more than 5 pounds in 1 week, or whatever weight gain you were told to report by your doctor. This is often a sign of worsening heart failure and needs to be evaluated and treated. Your doctor will tell you what to do next.   Tips for weighing yourself    · Weigh yourself at the same time each morning, wearing the same clothes. Weigh yourself after urinating and before eating.  · Use the same scale each day. Make sure the numbers are easy to read. Put the scale on a flat, hard surface -- not on a rug or carpet.  · Do not stop weighing yourself. If you forget one day, weigh again the next morning.  How to use your weight chart  · Keep your weight  chart near the scale. Write your weight on the chart as soon as you get off the scale.  · Fill in the month and the start date on the chart. Then write down your weight each day. Your chart will look like this:    · If you miss a day, leave the space blank. Weigh yourself the next day and write your weight in the next space.  · Take your weight chart with you when you go to see your doctor.  Date Last Reviewed: 3/20/2016  © 6461-4933 [employer]. [address] [place] [address], [place] [address]. All rights reserved. This information is not intended as a substitute for professional medical care. Always follow your healthcare professional's instructions.              Heart Failure: Warning Signs of a Flare-Up  You have a condition called heart failure. Once you have heart failure, flare-ups can happen. Below are signs that can mean your heart failure is getting worse. If you notice any of these warning signs, call your healthcare provider.  Swelling    · Your feet, ankles, or lower legs get puffier.  · You notice skin changes on your lower legs.  · Your shoes feel too tight.  · Your clothes are tighter in the waist.  · You have trouble getting rings on or off your fingers.  Shortness of breath  · You have to breathe harder even when youre doing your normal activities or when youre resting.  · You are short of breath walking up stairs or even short distances.  · You wake up at night short of breath or coughing.  · You need to use more pillows or sit up to sleep.  · You wake up tired or restless.  Other warning signs  · You feel weaker, dizzy, or more tired.  · You have chest pain or changes in your heartbeat.  · You have a cough that wont go away.  · You cant remember things or dont feel like eating.  Tracking your weight  Gaining weight is often the first warning sign that heart failure is getting worse. Gaining even a few pounds can be a sign that your body is retaining excess water and salt. Weighing  yourself each day in the morning after you urinate and before you eat, is the best way to know if you're retaining water. Get a scale that is easy to read and make sure you wear the same clothes and use the same scale every time you weigh. Your healthcare provider will show you how to track your weight. Call your doctor if you gain more than 2 pounds in 1 day, 5 pounds in 1 week, or whatever weight gain you were told to report by your doctor. This is often a sign of worsening heart failure and needs to be evaluated and treated before it compromises your breathing. Your doctor will tell you what to do next.    Date Last Reviewed: 3/15/2016  © 0773-7014 [employer]. [address] [place] [address], [place] [address]. All rights reserved. This information is not intended as a substitute for professional medical care. Always follow your healthcare professional's instructions.              Chronic Kindey Disease Education             Diabetes Discharge Instructions                                    [facility] complies with applicable Federal civil rights laws and does not discriminate on the basis of race, color, national origin, age, disability, or sex.

## 2017-02-20 NOTE — ANESTHESIA POSTPROCEDURE EVALUATION
Anesthesia Post Evaluation    Patient: Beth Salcedo    Procedure(s) Performed: Procedure(s) (LRB):  [id number]-CSOHHOGOMHGAD Femoral - Right vs Left (Left)    Final Anesthesia Type: general  Patient location during evaluation: PACU  Patient participation: Yes- Able to Participate  Level of consciousness: awake and alert  Post-procedure vital signs: reviewed and stable  Pain management: adequate  Airway patency: patent  PONV status at discharge: No PONV  Anesthetic complications: no      Cardiovascular status: stable  Respiratory status: unassisted and spontaneous ventilation  Hydration status: euvolemic  Follow-up not needed.        Visit Vitals    BP (!) 180/76    Pulse 66    Temp 36.5 °C (97.7 °F) (Oral)    Resp 19    Ht 5' (1.524 m)    Wt 57.6 kg (127 lb)    LMP  (LMP Unknown)    SpO2 98%    Breastfeeding No    BMI 24.8 kg/m2       Pain/Mary Lou Score: Pain Assessment Performed: Yes (2/20/2017 12:45 PM)  Presence of Pain: complains of pain/discomfort (2/20/2017 12:45 PM)  Pain Rating Prior to Med Admin: 6 (2/20/2017  1:10 PM)  Mary Lou Score: 10 (2/20/2017 12:45 PM)

## 2017-02-20 NOTE — PLAN OF CARE
Discharge information, medication, wound care and follow up reviewed with patient. OK for discharge.

## 2017-02-20 NOTE — ANESTHESIA RELEASE NOTE
Anesthesia Release from PACU Note    Patient: Beth Salcedo    Procedure(s) Performed: Procedure(s) (LRB):  ROTULJVGC-DGPUV-LKULTRXUPUJVI Femoral - Right vs Left (Left)  Anesthesia Release from PACU Note    Patient: Beth Salcedo    Procedure(s) Performed: Procedure(s) (LRB):  LFDLUIPJV-RAHAG-KVZXMAIJJWZJJ Femoral - Right vs Left (Left)    Anesthesia type: GEN    Post pain: Adequate analgesia reported    Post assessment: no apparent anesthetic complications, tolerated procedure well and no evidence of recall    Post vital signs:   Visit Vitals    BP (!) 180/76    Pulse 66    Temp 36.5 °C (97.7 °F) (Oral)    Resp 19    Ht 5' (1.524 m)    Wt 57.6 kg (127 lb)    LMP  (LMP Unknown)    SpO2 98%    Breastfeeding No    BMI 24.8 kg/m2       Level of consciousness: awake, alert and oriented    Nausea/Vomiting: no nausea/no vomiting    Complications: none    Airway Patency: patent    Respiratory: unassisted, spontaneous ventilation, room air    Cardiovascular: stable and blood pressure at baseline    Hydration: euvolemic

## 2017-02-20 NOTE — OP NOTE
DATE OF PROCEDURE: 2/20/2017    PREOPERATIVE DIAGNOSIS: End stage renal disease on dialysis    POSTOPERATIVE DIAGNOSIS: End stage renal disease on dialysis    PROCEDURES PERFORMED:  1. Left femoral 4-7 mm taper PTFE AV Graft    ATTENDING SURGEON: Dr PAM Rey    RESIDENT: Dr Tg Hough    ANESTHESIA: General    KEY FINDINGS: Good thrill in graft    ESTIMATED BLOOD LOSS: 10 ml    DRAINS: None    COMPLICATIONS: None    INDICATIONS FOR PROCEDURE: The patient is a 71 y.o. female with end stage renal disease on dialysis via permacath with a history of an upper extremity graft placed 11/2017 which had to be ligated secondary to early severe steal syndrome who presented with need for permanent dialysis access. Given her previous steal we recommended a lower extremity AVG and the patient was amenable to proceed. Based on this surgery was indicated.    PROCEDURE IN DETAIL: After informed consent was obtained, the patient was brought to the Operative Theater and placed in in the supine position. After adequate anesthesia the left lower extremity/groin was prepped and draped in standard sterile fashion. An oblique skin incision was made along the right sartorius/lines of langerhans in the right upper thigh. Dissection was carried down to the SFA and Femoral vein. The SFA was dissected free of the surrounding tissue and controlled with vessel loops. We did have to extend our incision laterally in order to find an area more proximally on the SFA which was soft and free of calcifications. The left femoral vein was dissected free of the surrounding tissue so that a Satinsky clamp was able to be placed around it. The patient was systemically anticoagulated with IV heparin. A 4-7mm PTFE graft was tunneled through the subcutaneous tissue with a 1cm counter incision in the mid-left thigh. The vein was controlled with the clamp and the 7mm end of the graft was sewn end-to-side to the femoral vein with 6-0 Goretex in a  continuous fashion. Next, the 4mm end of the graft was sewn in an end-to-side to the superficial femoral artery with 6-0 Goretex. Prior to completion the graft clamp was released and the femoral vein was allowed to backbleed into the graft to evacuate all air and debris. After completion of the anastomosis, the clamps were released. Hemostasis was achieved and good thrill was noted in the graft. The incision was then closed in layers, with deep tissue over the graft with a 3-0 Monocryl and deep dermal reapproximated with 3-0 Monocryl and skin with 4-0 Monocryl. The counter incision was closed with a 3-0 Monocryl. Telfa dressing and tegaderm were applied. Dr Rey was present and scrubbed for the entire procedure.

## 2017-02-20 NOTE — H&P (VIEW-ONLY)
See my prior notes; review of systems, family history and social history are   unchanged.    HISTORY OF PRESENT ILLNESS:  A 71-year-old female with end-stage renal disease,   dialyzes since June 2016, status post:  1.  Urgent ligation, left brachiocephalic graft, 11/10/2016, after placement of   this graft the day before, 11/09/2016.    This was ligated urgently because of severe steal.    She now returns for evaluation of femoral AV graft.  I believe she has a high   chance of having recurrent steal with the new graft in her other arm and thus I   think femoral graft is safer given her very, very small arteries.    PAST MEDICAL HISTORY:  1.  End-stage renal disease.  Dialyzing Tuesday, Thursday, Saturday.  2.  Anemia.  3.  Diabetes.  4.  Diastolic heart failure.  5.  Hypertension.  6.  Hyperlipidemia.    PAST SURGICAL HISTORY:  1.  Access surgery as above.  2.  Hysterectomy.    FAMILY HISTORY:  Positive for pneumonia.    SOCIAL HISTORY:  Nonsmoker.    MEDICATIONS:  Include aspirin.  See Epic for full list.    ALLERGIES:  Hydralazine, rifampin and simvastatin.    REVIEW OF SYSTEMS:  CARDIOVASCULAR:  Positive postprandial pain or DVT.  All other systems include eyes, ENT, respiratory, , musculoskeletal, breast,   psychiatric, lymph, allergy and immune are negative.    PHYSICAL EXAMINATION:  VITAL SIGNS:  See nursing note.  GENERAL:  She is hypertensive, not taken her antihypertensives this morning, in   no acute distress.  RESPIRATORY:  Normal effort.  Clear to auscultation.  CARDIAC:  Regular rate and rhythm, nondisplaced PMI, no murmur.  EXTREMITIES:  Bilateral lower extremities show 2+ femoral pulses.  There are no   scars in the femoral area.    IMAGING:  ABIs are artifactual because of medial calcinosis.  PVRs suggest   minimal occlusive disease.    Duplex of the common femoral artery and vein show no stenosis.  However, there   is scattered calcification of her arteries noted.    ASSESSMENT:  End-stage renal  disease, needs access.  She has had severe steal of   brachiocephalic graft in the arm and thus I believe there is a high risk for   recurrent steal if we put another graft there.    PLAN:  1.  Femoral AV graft, left versus right depending on immediate preoperative   duplex.  2.  General anesthesia.    The patient understands the risks and rationale of procedure and wishes to   proceed.    Case will be on 02/15/2017.      MARLEY  dd: 02/03/2017 09:28:57 (CST)  td: 02/04/2017 08:59:07 (CST)  Doc ID   #[id number]  Job ID #[id number]    CC:

## 2017-02-20 NOTE — INTERVAL H&P NOTE
The patient has been examined and the H&P has been reviewed:    I concur with the findings and no changes have occurred since H&P was written.    Anesthesia/Surgery risks, benefits and alternative options discussed and understood by patient/family.          Active Hospital Problems    Diagnosis  POA    ESRD (end stage renal disease) on dialysis [N18.6, Z99.2]  Not Applicable     Chronic      Resolved Hospital Problems    Diagnosis Date Resolved POA   No resolved problems to display.

## 2017-02-21 VITALS
WEIGHT: 127 LBS | OXYGEN SATURATION: 98 % | DIASTOLIC BLOOD PRESSURE: 50 MMHG | TEMPERATURE: 98 F | RESPIRATION RATE: 16 BRPM | HEART RATE: 68 BPM | BODY MASS INDEX: 24.94 KG/M2 | HEIGHT: 60 IN | SYSTOLIC BLOOD PRESSURE: 186 MMHG

## 2017-02-24 ENCOUNTER — TELEPHONE (OUTPATIENT)
Dept: VASCULAR SURGERY | Facility: CLINIC | Age: 72
End: 2017-02-24

## 2017-02-24 NOTE — TELEPHONE ENCOUNTER
Spoke to the pt , Pt follow-up appt has been scheduled for (3/3/17) . This appt was mailed to the pt today (02/24/17)

## 2017-02-24 NOTE — TELEPHONE ENCOUNTER
----- Message from Camron Duvall sent at 2/24/2017  1:28 PM CST -----  Pt said she needs to schedule post op appt. Pt said she would prefer next Friday or Monday. Please call pt regarding this at [phone]

## 2017-03-03 ENCOUNTER — OFFICE VISIT (OUTPATIENT)
Dept: VASCULAR SURGERY | Facility: CLINIC | Age: 72
End: 2017-03-03
Payer: MEDICARE

## 2017-03-03 ENCOUNTER — HOSPITAL ENCOUNTER (OUTPATIENT)
Dept: VASCULAR SURGERY | Facility: CLINIC | Age: 72
Discharge: HOME | End: 2017-03-03
Payer: MEDICARE

## 2017-03-03 VITALS
TEMPERATURE: 99 F | SYSTOLIC BLOOD PRESSURE: 182 MMHG | HEART RATE: 71 BPM | BODY MASS INDEX: 26.5 KG/M2 | DIASTOLIC BLOOD PRESSURE: 58 MMHG | WEIGHT: 135 LBS | HEIGHT: 60 IN

## 2017-03-03 DIAGNOSIS — N18.6 ESRD (END STAGE RENAL DISEASE) ON DIALYSIS: Primary | Chronic | ICD-10-CM

## 2017-03-03 DIAGNOSIS — Z99.2 ESRD (END STAGE RENAL DISEASE) ON DIALYSIS: Primary | Chronic | ICD-10-CM

## 2017-03-03 DIAGNOSIS — N18.6 ESRD (END STAGE RENAL DISEASE) ON DIALYSIS: Chronic | ICD-10-CM

## 2017-03-03 DIAGNOSIS — Z99.2 ESRD (END STAGE RENAL DISEASE) ON DIALYSIS: Chronic | ICD-10-CM

## 2017-03-03 PROCEDURE — 99024 POSTOP FOLLOW-UP VISIT: CPT | Mod: S$GLB,,, | Performed by: SURGERY

## 2017-03-03 PROCEDURE — 99999 PR PBB SHADOW E&M-EST. PATIENT-LVL III: CPT | Mod: PBBFAC,,, | Performed by: SURGERY

## 2017-03-03 PROCEDURE — 93990 DOPPLER FLOW TESTING: CPT | Mod: S$GLB,,, | Performed by: SURGERY

## 2017-03-03 PROCEDURE — 99499 UNLISTED E&M SERVICE: CPT | Mod: S$GLB,,, | Performed by: SURGERY

## 2017-03-03 NOTE — PROGRESS NOTES
See my prior note; review of systems, family history and social history are   unchanged.    HISTORY OF PRESENT ILLNESS:  A 71-year-old female status post:  1.  Left femoral 4-7 taper AV graft placement 02/20/2017.  2.  Urgent ligation, left brachiocephalic graft November 2016, after placement   of the graft, the day before 11/09/2016.  3.  Right IJ PermCath placement; Nephrology Access Service June 2016.    She now returns.  She is without complaints.    PHYSICAL EXAMINATION:  VITAL SIGNS:  See nursing note.  EXTREMITIES:  Left leg shows an excellent thrill without pulsatility in the   femoral graft.  All incisions are well healing.  Foot is pink and well perfused.    IMAGING:  Duplex of the graft shows no stenosis with a flow volume of 2.1   liters.  A proximal graft velocity shift of 536 is noted and this is due to the   tapered nature of this graft.    ASSESSMENT:  Well-functioning left femoral graft placed approximately two weeks   ago.    RECOMMENDATION:  1.  May cannulate left femoral graft.    PLAN:  1.  Follow up with the Nephrology Access Service for PermCath removal in   approximately two weeks' time.  2.  Follow up with me in three months with surveillance duplex of the femoral   graft.      POLO/CHEY  dd: 03/03/2017 11:12:02 (CST)  td: 03/03/2017 23:36:17 (CST)  Doc ID   #[id number]  Job ID #[id number]    CC:

## 2017-03-03 NOTE — MR AVS SNAPSHOT
[facility]  [address]  [facility] [address]  Phone: [phone]  Fax: [phone]                  Beth Salcedo   3/3/2017 10:45 AM   Office Visit    Description:  Female : 1945   Provider:  CHARLI Rey III, MD   Department:  [facility] Surgery           Reason for Visit     Post-op Evaluation           Diagnoses this Visit        Comments    ESRD (end stage renal disease) on dialysis    -  Primary            To Do List           Goals (5 Years of Data)     None      Follow-Up and Disposition     Return in about 3 months (around 6/3/2017).      [facility] On [facility]     [facility] On Call Nurse Care Line -  Assistance  Registered nurses in the [facility] On Call Center provide clinical advisement, health education, appointment booking, and other advisory services.  Call for this free service at [phone].             Medications           Message regarding Medications     Verify the changes and/or additions to your medication regime listed below are the same as discussed with your clinician today.  If any of these changes or additions are incorrect, please notify your healthcare provider.             Verify that the below list of medications is an accurate representation of the medications you are currently taking.  If none reported, the list may be blank. If incorrect, please contact your healthcare provider. Carry this list with you in case of emergency.           Current Medications     amlodipine (NORVASC) 10 MG tablet TAKE ONE DAILY ***GENERIC NORVASC    aspirin 81 mg Tab Take 1 tablet by mouth Daily.    blood sugar diagnostic Strp Test 3 times daily. Please dispense 1 meter covered by insurance. Meter, Strips, and lancets    calcitRIOL (ROCALTROL) 0.25 MCG Cap Take 1 capsule (0.25 mcg total) by mouth once daily.    calcium carbonate (OS-HILLARY) 500 mg calcium (1,250 mg) tablet Take 1 tablet (500 mg total) by mouth 3 (three) times daily with meals.     carvedilol (COREG) 12.5 MG tablet Take 1 tablet (12.5 mg total) by mouth 2 (two) times daily.    cloNIDine 0.2 mg/24 hr td ptwk (CATAPRES) 0.2 mg/24 hr Place 1 patch onto the skin every 7 days.    docusate sodium (COLACE) 50 MG capsule Take 1 capsule (50 mg total) by mouth 2 (two) times daily.    evolocumab (REPATHA SURECLICK) 140 mg/mL PnIj Inject 1 Syringe into the skin every 14 (fourteen) days.    furosemide (LASIX) 80 MG tablet Take 1 tablet by mouth 2 (two) times daily.    hydrocodone-acetaminophen 5-325mg (NORCO) 5-325 mg per tablet Take 1 tablet by mouth every 4 (four) hours as needed.    isosorbide mononitrate (IMDUR) 30 MG 24 hr tablet Take 1 tablet (30 mg total) by mouth once daily.    losartan (COZAAR) 100 MG tablet Take 1 tablet by mouth once daily.    sevelamer carbonate (RENVELA) 800 mg Tab Take 1 tablet (800 mg total) by mouth 3 (three) times daily with meals.    sodium bicarbonate 650 MG tablet Take 2 tablets (1,300 mg total) by mouth 2 (two) times daily.    venlafaxine (EFFEXOR) 25 MG Tab Take 1 tablet (25 mg total) by mouth once daily.    VITAMIN D2 50,000 unit capsule ONE EVERY WEEK           Clinical Reference Information           Your Vitals Were     BP Pulse Temp Height Weight Last Period    182/58 (BP Location: Right arm, Patient Position: Sitting, BP Method: Automatic) 71 98.5 °F (36.9 °C) (Oral) 5' (1.524 m) 61.2 kg (135 lb) (LMP Unknown)    BMI                26.37 kg/m2          Blood Pressure          Most Recent Value    BP  (!)  182/58      Allergies as of 3/3/2017     Hydralazine    Ramipril    Simvastatin      Immunizations Administered on Date of Encounter - 3/3/2017     None      Maintenance Dialysis History     Start End Type Comments Center    6/10/2016  [place]  [facility]            Current Dialysis Center Information     [facility] [address] Phone #:  [phone]    Contact:  N/A KARINA Krishnamurthy  [address] Fax #:  [phone]             Language Assistance Services     ATTENTION: Language assistance services are available, free of charge. Please call [phone].      ATENCIÓN: Si habla kayley, tiene a reyes disposición servicios gratuitos de asistencia lingüística. Llame al [phone].     CHÚ Ý: N?u b?n nói Ti?ng Vi?t, có các d?ch v? h? tr? ngôn ng? mi?n phí dành cho b?n. G?i s? [phone].         [facility] complies with applicable Federal civil rights laws and does not discriminate on the basis of race, color, national origin, age, disability, or sex.

## 2017-03-09 DIAGNOSIS — I16.0 HYPERTENSIVE URGENCY: Primary | ICD-10-CM

## 2017-03-10 RX ORDER — ISOSORBIDE MONONITRATE 30 MG/1
TABLET, EXTENDED RELEASE ORAL
Refills: 0 | OUTPATIENT
Start: 2017-03-10

## 2017-03-10 RX ORDER — CALCITRIOL 0.25 UG/1
CAPSULE ORAL
Refills: 0 | OUTPATIENT
Start: 2017-03-10

## 2017-03-23 RX ORDER — LOSARTAN POTASSIUM 100 MG/1
100 TABLET ORAL DAILY
Qty: 30 TABLET | Refills: 3 | Status: SHIPPED | OUTPATIENT
Start: 2017-03-23 | End: 2017-10-03 | Stop reason: SDUPTHER

## 2017-04-07 ENCOUNTER — HOSPITAL ENCOUNTER (OUTPATIENT)
Facility: HOSPITAL | Age: 72
Discharge: HOME | End: 2017-04-07
Attending: INTERNAL MEDICINE | Admitting: INTERNAL MEDICINE
Payer: MEDICARE

## 2017-04-07 DIAGNOSIS — Z99.2 HEMODIALYSIS ACCESS, AV GRAFT: Primary | ICD-10-CM

## 2017-04-07 DIAGNOSIS — N18.5 CHRONIC KIDNEY DISEASE, STAGE V: ICD-10-CM

## 2017-04-07 PROCEDURE — 25000003 PHARM REV CODE 250

## 2017-04-07 PROCEDURE — 36589 REMOVAL TUNNELED CV CATH: CPT | Mod: ,,, | Performed by: INTERNAL MEDICINE

## 2017-04-07 PROCEDURE — 36589 REMOVAL TUNNELED CV CATH: CPT

## 2017-04-07 NOTE — PROCEDURES
DATE OF PROCEDURE: 4/7/17  PROCEDURE TYPE: Removal of right internal jugular tunnel catheter.   INDICATION: Completed treatment  PROCEDURE NOTE: After informed consent was obtained, the area of Ms. benavidez  catheter was sterilely prepped and draped in usual fashion. Anesthesia was   obtained with 2% lidocaine with epinephrine, and the subcutaneous cuff was blunt   dissected free. The catheter was then removed in total, and a compression   dressing was applied to the exit site. She tolerated the procedure well.   There were no immediate complications. Estimated blood loss was less than 1   mL. No sedation was given.

## 2017-04-10 NOTE — TELEPHONE ENCOUNTER
----- Message from Vladimir Pena sent at 4/10/2017 11:14 AM CDT -----  Contact: self/ [phone] home  Type: Rx    Name of medication(s): isosorbide mononitrate (IMDUR) 30 MG 24 hr tablet , calcitRIOL (ROCALTROL) 0.25 MCG Cap     Is this a refill? New rx? refill    Who prescribed medication? Dr. Coles    Pharmacy Name, Phone, & Location: [facility] on file    Comments: Pt is calling to request that a prescription be sent in to the pharmacy for the medication above.  The pt states that she no longer sees Dr. Coles because she has to see the doctor that is in the dialysis center.  She would like to speak with someone in the office to discuss.  Please call and advise.    Thank you

## 2017-04-12 RX ORDER — CALCITRIOL 0.25 UG/1
0.25 CAPSULE ORAL DAILY
Qty: 30 CAPSULE | Refills: 1 | OUTPATIENT
Start: 2017-04-12

## 2017-04-12 RX ORDER — ISOSORBIDE MONONITRATE 30 MG/1
30 TABLET, EXTENDED RELEASE ORAL DAILY
Qty: 30 TABLET | Refills: 1 | OUTPATIENT
Start: 2017-04-12

## 2017-04-18 ENCOUNTER — TELEPHONE (OUTPATIENT)
Dept: INTERNAL MEDICINE | Facility: CLINIC | Age: 72
End: 2017-04-18

## 2017-04-18 NOTE — TELEPHONE ENCOUNTER
Pharmacist needs to contact her nephrologist- she is on dialysis, so her nephrologist is in charge of her medication

## 2017-04-18 NOTE — TELEPHONE ENCOUNTER
Spoke with someone at [facility] drugs stated verbalized understanding.  name and phone number was given for them to get in contact with her nephrologist.

## 2017-04-19 ENCOUNTER — TELEPHONE (OUTPATIENT)
Dept: NEPHROLOGY | Facility: CLINIC | Age: 72
End: 2017-04-19

## 2017-04-19 NOTE — TELEPHONE ENCOUNTER
She is on hemodialysis and is no longer my patietn, which unit is she at?  That will be the nephrologist

## 2017-06-09 ENCOUNTER — OFFICE VISIT (OUTPATIENT)
Dept: PODIATRY | Facility: CLINIC | Age: 72
End: 2017-06-09
Payer: MEDICARE

## 2017-06-09 VITALS
HEART RATE: 61 BPM | BODY MASS INDEX: 26.31 KG/M2 | SYSTOLIC BLOOD PRESSURE: 139 MMHG | HEIGHT: 60 IN | RESPIRATION RATE: 19 BRPM | WEIGHT: 134 LBS | DIASTOLIC BLOOD PRESSURE: 61 MMHG

## 2017-06-09 DIAGNOSIS — Z99.2 TYPE 2 DIABETES MELLITUS WITH CHRONIC KIDNEY DISEASE ON CHRONIC DIALYSIS, WITHOUT LONG-TERM CURRENT USE OF INSULIN: Primary | ICD-10-CM

## 2017-06-09 DIAGNOSIS — E11.22 TYPE 2 DIABETES MELLITUS WITH CHRONIC KIDNEY DISEASE ON CHRONIC DIALYSIS, WITHOUT LONG-TERM CURRENT USE OF INSULIN: Primary | ICD-10-CM

## 2017-06-09 DIAGNOSIS — N18.6 TYPE 2 DIABETES MELLITUS WITH CHRONIC KIDNEY DISEASE ON CHRONIC DIALYSIS, WITHOUT LONG-TERM CURRENT USE OF INSULIN: Primary | ICD-10-CM

## 2017-06-09 DIAGNOSIS — T14.8XXA BLOOD BLISTER: ICD-10-CM

## 2017-06-09 PROCEDURE — 99499 UNLISTED E&M SERVICE: CPT | Mod: S$GLB,,, | Performed by: PODIATRIST

## 2017-06-09 PROCEDURE — 99203 OFFICE O/P NEW LOW 30 MIN: CPT | Mod: S$GLB,,, | Performed by: PODIATRIST

## 2017-06-09 PROCEDURE — 1159F MED LIST DOCD IN RCRD: CPT | Mod: S$GLB,,, | Performed by: PODIATRIST

## 2017-06-09 PROCEDURE — 3066F NEPHROPATHY DOC TX: CPT | Mod: S$GLB,,, | Performed by: PODIATRIST

## 2017-06-09 PROCEDURE — 3044F HG A1C LEVEL LT 7.0%: CPT | Mod: S$GLB,,, | Performed by: PODIATRIST

## 2017-06-09 PROCEDURE — 1126F AMNT PAIN NOTED NONE PRSNT: CPT | Mod: S$GLB,,, | Performed by: PODIATRIST

## 2017-06-09 PROCEDURE — 99999 PR PBB SHADOW E&M-EST. PATIENT-LVL III: CPT | Mod: PBBFAC,,, | Performed by: PODIATRIST

## 2017-06-09 RX ORDER — CLONIDINE 0.3 MG/24H
1 PATCH, EXTENDED RELEASE TRANSDERMAL WEEKLY
COMMUNITY
Start: 2017-05-26 | End: 2018-01-01

## 2017-06-10 NOTE — PROGRESS NOTES
Subjective:      Patient ID: Beth Salcedo is a 72 y.o. female.    Chief Complaint: PCP (Emili Sanchez MD 1/04/17); Diabetic Foot Exam; and Foot Problem (4th toe left dry blood blister )    Beth is a 72 y.o. female who presents to the clinic upon referral from Dr. Reis  for evaluation and treatment of diabetic feet. Beth has a past medical history of Anemia of chronic renal failure, stage 5; Chronic kidney disease; Diabetes mellitus type II; Diastolic heart failure; ESRD on dialysis since 6/10/16; Essential hypertension; Hyperlipidemia; Hypertension; LVH (left ventricular hypertrophy); and Secondary hyperparathyroidism of renal origin. Patient relates no major problem with feet. Only complaints today consist of dark spot to L 4th toe after stubbing toe on concrete surface . No pain or drainage from area     PCP: Emili Sanchez MD    Date Last Seen by PCP:   Chief Complaint   Patient presents with    PCP     Emili Sanchez MD 1/04/17    Diabetic Foot Exam    Foot Problem     4th toe left dry blood blister          Current shoe gear: Flip-flops    Hemoglobin A1C   Date Value Ref Range Status   12/14/2016 6.4 (H) 4.5 - 6.2 % Final     Comment:     According to ADA guidelines, hemoglobin A1C <7.0% represents  optimal control in non-pregnant diabetic patients.  Different  metrics may apply to specific populations.   Standards of Medical Care in Diabetes - 2016.  For the purpose of screening for the presence of diabetes:  <5.7%     Consistent with the absence of diabetes  5.7-6.4%  Consistent with increasing risk for diabetes   (prediabetes)  >or=6.5%  Consistent with diabetes  Currently no consensus exists for use of hemoglobin A1C  for diagnosis of diabetes for children.     06/05/2016 5.2 4.5 - 6.2 % Final   03/15/2016 5.3 4.5 - 6.2 % Final           Review of Systems   Constitution: Negative for chills, decreased appetite and fever.   Cardiovascular: Negative for leg swelling.    Musculoskeletal: Negative for arthritis, joint pain, joint swelling and myalgias.   Gastrointestinal: Negative for nausea and vomiting.   Neurological: Negative for loss of balance, numbness and paresthesias.           Objective:      Physical Exam   Constitutional: She is oriented to person, place, and time. She appears well-developed and well-nourished.   Cardiovascular:   Pulses:       Dorsalis pedis pulses are 2+ on the right side, and 2+ on the left side.        Posterior tibial pulses are 2+ on the right side, and 2+ on the left side.   Musculoskeletal: She exhibits no edema or tenderness.        Right ankle: Normal.        Left ankle: Normal.        Right foot: There is no swelling, no crepitus and no deformity.        Left foot: There is no swelling, no crepitus and no deformity.   Adequate joint range of motion without pain, limitation, nor crepitation Bilateral feet and ankle joints. Muscle strength is 5/5 in all groups bilaterally.         Lymphadenopathy:   No palpable lymph nodes   Neurological: She is alert and oriented to person, place, and time. She has normal strength.   Skin: Skin is warm, dry and intact. No rash noted. No erythema. Nails show no clubbing.   Darkened dried blood blister dorsal DIPJ L 4th toe No surrounding erythema, edema, malodor, nor drainage noted  No TTP   Psychiatric: She has a normal mood and affect. Her behavior is normal.             Assessment:       Encounter Diagnoses   Name Primary?    Type 2 diabetes mellitus with chronic kidney disease on chronic dialysis, without long-term current use of insulin Yes    Blood blister          Plan:       Beth was seen today for pcp, diabetic foot exam and foot problem.    Diagnoses and all orders for this visit:    Type 2 diabetes mellitus with chronic kidney disease on chronic dialysis, without long-term current use of insulin    Blood blister      I counseled the patient on her conditions, their implications and medical  management.      - Shoe inspection. Diabetic Foot Education. Patient reminded of the importance of good nutrition and blood sugar control to help prevent podiatric complications of diabetes. Patient instructed on proper foot hygeine. We discussed wearing proper shoe gear, daily foot inspections, never walking without protective shoe gear, caution putting sharp instruments to feet     - Discussed DM foot care:  Wear comfortable, proper fitting shoes. Wash feet daily. Dry well. After drying, apply moisturizer to feet (no lotion to webspaces). Inspect feet daily for skin breaks, blisters, swelling, or redness. Wear cotton socks (preferably white)  Change socks every day. Do NOT walk barefoot. Do NOT use heating pads or warm/hot water soaks     - Discussed importance of daily moisturizer to the feet such as Gold bonds diabetic foot cream    - Patient is low risk for developing lower extremity issues secondary to diabetes    - Stable , small dried blood blister. This was removed w. scalpel w/o issues. Underlying dermis intact . No further treatment indicated      - RTC in  1 year for a diabetic foot exam  or sooner if problems    .

## 2017-06-28 ENCOUNTER — LAB VISIT (OUTPATIENT)
Dept: LAB | Facility: HOSPITAL | Age: 72
End: 2017-06-28
Attending: INTERNAL MEDICINE
Payer: MEDICARE

## 2017-06-28 DIAGNOSIS — E78.2 MIXED HYPERLIPIDEMIA: ICD-10-CM

## 2017-06-28 LAB
CHOLEST/HDLC SERPL: 3.9 {RATIO}
HDL/CHOLESTEROL RATIO: 25.6 %
HDLC SERPL-MCNC: 180 MG/DL
HDLC SERPL-MCNC: 46 MG/DL
LDLC SERPL CALC-MCNC: 114.2 MG/DL
NONHDLC SERPL-MCNC: 134 MG/DL
TRIGL SERPL-MCNC: 99 MG/DL

## 2017-06-28 PROCEDURE — 80061 LIPID PANEL: CPT

## 2017-06-28 PROCEDURE — 36415 COLL VENOUS BLD VENIPUNCTURE: CPT | Mod: PO

## 2017-06-29 ENCOUNTER — TELEPHONE (OUTPATIENT)
Dept: CARDIOLOGY | Facility: CLINIC | Age: 72
End: 2017-06-29

## 2017-06-29 NOTE — TELEPHONE ENCOUNTER
----- Message from Wilian Salas MD sent at 6/29/2017  9:19 AM CDT -----  Please let Ms. Campos know that her LDL cholesterol has been cut by more than half all the way to 114 (it's been as high as 257).  Good news.    Thanks,  Wilian

## 2017-07-05 ENCOUNTER — OFFICE VISIT (OUTPATIENT)
Dept: CARDIOLOGY | Facility: CLINIC | Age: 72
End: 2017-07-05
Payer: MEDICARE

## 2017-07-05 VITALS
DIASTOLIC BLOOD PRESSURE: 50 MMHG | HEIGHT: 60 IN | HEART RATE: 64 BPM | BODY MASS INDEX: 26.66 KG/M2 | SYSTOLIC BLOOD PRESSURE: 152 MMHG | WEIGHT: 135.81 LBS

## 2017-07-05 DIAGNOSIS — I10 ESSENTIAL HYPERTENSION: Primary | Chronic | ICD-10-CM

## 2017-07-05 DIAGNOSIS — E11.22 TYPE 2 DIABETES MELLITUS WITH CHRONIC KIDNEY DISEASE ON CHRONIC DIALYSIS, WITHOUT LONG-TERM CURRENT USE OF INSULIN: ICD-10-CM

## 2017-07-05 DIAGNOSIS — I51.7 LVH (LEFT VENTRICULAR HYPERTROPHY): ICD-10-CM

## 2017-07-05 DIAGNOSIS — Z99.2 TYPE 2 DIABETES MELLITUS WITH CHRONIC KIDNEY DISEASE ON CHRONIC DIALYSIS, WITHOUT LONG-TERM CURRENT USE OF INSULIN: ICD-10-CM

## 2017-07-05 DIAGNOSIS — D63.1 ANEMIA OF CHRONIC RENAL FAILURE, STAGE 5: Chronic | ICD-10-CM

## 2017-07-05 DIAGNOSIS — I70.0 ATHEROSCLEROSIS OF AORTA: ICD-10-CM

## 2017-07-05 DIAGNOSIS — N18.6 TYPE 2 DIABETES MELLITUS WITH CHRONIC KIDNEY DISEASE ON CHRONIC DIALYSIS, WITHOUT LONG-TERM CURRENT USE OF INSULIN: ICD-10-CM

## 2017-07-05 DIAGNOSIS — E78.2 MIXED HYPERLIPIDEMIA: ICD-10-CM

## 2017-07-05 DIAGNOSIS — Z99.2 ESRD (END STAGE RENAL DISEASE) ON DIALYSIS: Chronic | ICD-10-CM

## 2017-07-05 DIAGNOSIS — N18.5 ANEMIA OF CHRONIC RENAL FAILURE, STAGE 5: Chronic | ICD-10-CM

## 2017-07-05 DIAGNOSIS — N18.6 ESRD (END STAGE RENAL DISEASE) ON DIALYSIS: Chronic | ICD-10-CM

## 2017-07-05 DIAGNOSIS — Z78.9 STATIN INTOLERANCE: ICD-10-CM

## 2017-07-05 PROBLEM — E87.79 HYPERTENSION WITH HYPERVOLEMIA: Status: RESOLVED | Noted: 2017-01-15 | Resolved: 2017-07-05

## 2017-07-05 PROCEDURE — 99499 UNLISTED E&M SERVICE: CPT | Mod: S$GLB,,, | Performed by: INTERNAL MEDICINE

## 2017-07-05 PROCEDURE — 1126F AMNT PAIN NOTED NONE PRSNT: CPT | Mod: S$GLB,,, | Performed by: INTERNAL MEDICINE

## 2017-07-05 PROCEDURE — 3066F NEPHROPATHY DOC TX: CPT | Mod: S$GLB,,, | Performed by: INTERNAL MEDICINE

## 2017-07-05 PROCEDURE — 1159F MED LIST DOCD IN RCRD: CPT | Mod: S$GLB,,, | Performed by: INTERNAL MEDICINE

## 2017-07-05 PROCEDURE — 99999 PR PBB SHADOW E&M-EST. PATIENT-LVL III: CPT | Mod: PBBFAC,,, | Performed by: INTERNAL MEDICINE

## 2017-07-05 PROCEDURE — 99214 OFFICE O/P EST MOD 30 MIN: CPT | Mod: S$GLB,,, | Performed by: INTERNAL MEDICINE

## 2017-07-05 PROCEDURE — 3044F HG A1C LEVEL LT 7.0%: CPT | Mod: S$GLB,,, | Performed by: INTERNAL MEDICINE

## 2017-07-05 NOTE — PROGRESS NOTES
"Subjective:   Patient ID:  Beth Salcedo is a 72 y.o. female who presents for follow up of Hypertension      HPI: Here for 6 month f/u.  Doing well overall and denies chest discomfort, MENDEZ, palpitations, PND/orthopnea, lightheadedness and syncope.    Occasionally, her BP has been getting low at the end of dialysis sessions lately.    She hasn't gotten Repatha yet, and has to get with the pharmacy now that her taxes are done.    Jan 2017 HPI: Here for 4 month f/u.  She finds that her BP is much better at home (140-160 systolic) but gets "white coat syndrome" according to her.       She feels well and denies chest discomfort, MENDEZ, palpitations, PND/orthopnea, lightheadedness and syncope.     She's interested in home dialysis.     Sept 2016 HPI: Very pleasant woman with ESRD on HD who has difficult to control HTN. She has no history of CAD or MI though she does have hypertensive heart disease with severe LA enlargement.     BPs have mostly been high normal to about what she is today lately except for occasional spikes at the end of dialysis. This is managed with PRN clonidine.     She denies angina, new shortness of breath, lightheadedness, or syncope.    Patient Active Problem List   Diagnosis    Mixed hyperlipidemia    Cataract, bilateral    Type II diabetes mellitus with renal manifestations    Atherosclerosis of aorta    Anemia of chronic renal failure, stage 5    ESRD (end stage renal disease) on dialysis    Essential hypertension    Secondary hyperparathyroidism of renal origin    LVH (left ventricular hypertrophy)    Diastolic heart failure    Hemodialysis access, AV graft    Venous insufficiency of both lower extremities    PAOD (peripheral arterial occlusive disease)       Current Outpatient Prescriptions   Medication Sig    amlodipine (NORVASC) 10 MG tablet TAKE ONE DAILY     aspirin 81 mg Tab Take 1 tablet by mouth Daily.    blood sugar diagnostic Strp Test 3 times daily. Please " dispense 1 meter covered by insurance. Meter, Strips, and lancets    calcitRIOL (ROCALTROL) 0.25 MCG Cap Take 1 capsule (0.25 mcg total) by mouth once daily.    calcium carbonate (OS-HILLARY) 500 mg calcium (1,250 mg) tablet Take 1 tablet (500 mg total) by mouth 3 (three) times daily with meals.    carvedilol (COREG) 12.5 MG tablet Take 1 tablet (12.5 mg total) by mouth 2 (two) times daily.    cloNIDine 0.3 mg/24 hr td ptwk (CATAPRES) 0.3 mg/24 hr Place 1 patch onto the skin once a week.     docusate sodium (COLACE) 50 MG capsule Take 1 capsule (50 mg total) by mouth 2 (two) times daily. (Patient taking differently: Take 50 mg by mouth as needed. )    losartan (COZAAR) 100 MG tablet Take 1 tablet (100 mg total) by mouth once daily.    VITAMIN D2 50,000 unit capsule ONE EVERY WEEK (Patient taking differently: PT TAKEN EVERY TUES,THUR ,SAT)     No current facility-administered medications for this visit.        Review of Systems   Constitution: Negative.   HENT: Negative.    Eyes: Negative.    Cardiovascular: Negative.  Negative for chest pain, dyspnea on exertion, near-syncope, orthopnea and palpitations.   Respiratory: Negative.  Negative for cough, hemoptysis and shortness of breath.    Endocrine: Negative.    Hematologic/Lymphatic: Negative.    Skin: Negative.    Musculoskeletal: Negative.    Gastrointestinal: Negative.    Genitourinary: Negative.    Neurological: Negative.    Psychiatric/Behavioral: Negative.      Objective:   Physical Exam   Constitutional: She is oriented to person, place, and time. She appears well-developed and well-nourished.   HENT:   Head: Normocephalic and atraumatic.   Mouth/Throat: Oropharynx is clear and moist.   Eyes: Conjunctivae and EOM are normal. No scleral icterus.   Neck: Normal range of motion. Neck supple. No JVD present.   Cardiovascular: Normal rate, regular rhythm, normal heart sounds and intact distal pulses.  Exam reveals no gallop and no friction rub.    No murmur  heard.  Pulmonary/Chest: Effort normal and breath sounds normal. She has no wheezes. She has no rales.   Abdominal: Soft. Bowel sounds are normal. She exhibits no distension. There is no tenderness.   Musculoskeletal: Normal range of motion. She exhibits no edema.   Neurological: She is alert and oriented to person, place, and time.   Skin: Skin is warm and dry. No rash noted. No erythema.   Psychiatric: She has a normal mood and affect. Her behavior is normal. Judgment and thought content normal.   Vitals reviewed.      Lab Results   Component Value Date    WBC 5.66 12/16/2016    HGB 9.3 (L) 12/16/2016    HCT 36 02/20/2017    MCV 98 12/16/2016     12/16/2016         Chemistry        Component Value Date/Time     12/16/2016 0439    K 5.0 12/16/2016 0439     12/16/2016 0439    CO2 24 12/16/2016 0439    BUN 36 (H) 12/16/2016 0439    CREATININE 4.8 (H) 12/16/2016 0439     (H) 12/16/2016 0439        Component Value Date/Time    CALCIUM 9.3 12/16/2016 0439    ALKPHOS 67 12/16/2016 0439    AST 11 12/16/2016 0439    ALT 16 12/16/2016 0439    BILITOT 0.4 12/16/2016 0439    ESTGFRAFRICA 9.7 (A) 12/16/2016 0439    EGFRNONAA 8.4 (A) 12/16/2016 0439            Lab Results   Component Value Date    CHOL 180 06/28/2017    CHOL 294 (H) 08/19/2015    CHOL 342 (H) 10/04/2014     Lab Results   Component Value Date    HDL 46 06/28/2017    HDL 42 08/19/2015    HDL 41 10/04/2014     Lab Results   Component Value Date    LDLCALC 114.2 06/28/2017    LDLCALC 216.4 (H) 08/19/2015    LDLCALC 257.2 (H) 10/04/2014     Lab Results   Component Value Date    TRIG 99 06/28/2017    TRIG 178 (H) 08/19/2015    TRIG 219 (H) 10/04/2014     Lab Results   Component Value Date    CHOLHDL 25.6 06/28/2017    CHOLHDL 14.3 (L) 08/19/2015    CHOLHDL 12.0 (L) 10/04/2014       Lab Results   Component Value Date    TSH 3.860 12/14/2016       Lab Results   Component Value Date    HGBA1C 6.4 (H) 12/14/2016       Assessment:     1.  Essential hypertension    2. Mixed hyperlipidemia    3. Atherosclerosis of aorta    4. LVH (left ventricular hypertrophy)    5. ESRD (end stage renal disease) on dialysis    6. Anemia of chronic renal failure, stage 5    7. Type 2 diabetes mellitus with chronic kidney disease on chronic dialysis, without long-term current use of insulin        Plan:     She's had a remarkable change in her lipid panel and she's not taking anything (no statin or PCSK-9 inhibitor).  She could still benefit from therapy but it's not nearly as dire a situation as it appeared.  I do think that rechecking her lipid panel at some point would be ghosh (to make sure this recent panel wasn't erroneous).  Zetia would be a reasonable option for her if her baseline LDL is really 114 and not 200+.    Continue current medicines.    Diet/exercise goals reinforced.    F/U 6 months

## 2017-07-27 ENCOUNTER — PATIENT MESSAGE (OUTPATIENT)
Dept: RESEARCH | Facility: HOSPITAL | Age: 72
End: 2017-07-27

## 2017-08-03 ENCOUNTER — OFFICE VISIT (OUTPATIENT)
Dept: SURGERY | Facility: CLINIC | Age: 72
End: 2017-08-03
Payer: MEDICARE

## 2017-08-03 VITALS
SYSTOLIC BLOOD PRESSURE: 172 MMHG | DIASTOLIC BLOOD PRESSURE: 58 MMHG | WEIGHT: 128 LBS | TEMPERATURE: 99 F | HEART RATE: 74 BPM | BODY MASS INDEX: 25.13 KG/M2 | HEIGHT: 60 IN

## 2017-08-03 DIAGNOSIS — N18.6 ESRD (END STAGE RENAL DISEASE) ON DIALYSIS: Primary | Chronic | ICD-10-CM

## 2017-08-03 DIAGNOSIS — Z99.2 ESRD (END STAGE RENAL DISEASE) ON DIALYSIS: Primary | Chronic | ICD-10-CM

## 2017-08-03 DIAGNOSIS — E89.6 HX OF PARTIAL ADRENALECTOMY: ICD-10-CM

## 2017-08-03 PROCEDURE — 1159F MED LIST DOCD IN RCRD: CPT | Mod: S$GLB,,, | Performed by: SURGERY

## 2017-08-03 PROCEDURE — 3008F BODY MASS INDEX DOCD: CPT | Mod: S$GLB,,, | Performed by: SURGERY

## 2017-08-03 PROCEDURE — 99999 PR PBB SHADOW E&M-EST. PATIENT-LVL IV: CPT | Mod: PBBFAC,,, | Performed by: SURGERY

## 2017-08-03 PROCEDURE — 99203 OFFICE O/P NEW LOW 30 MIN: CPT | Mod: S$GLB,,, | Performed by: SURGERY

## 2017-08-03 PROCEDURE — 99499 UNLISTED E&M SERVICE: CPT | Mod: S$GLB,,, | Performed by: SURGERY

## 2017-08-03 NOTE — PROGRESS NOTES
"History & Physical    SUBJECTIVE:     History of Present Illness:  Patient is a 72 y.o. female presents with esrd.  She has failed one fistula and is having some trouble with her femoral fistula.  She is right handed and wears her belt line high.  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  So far she has decided against transplant.      Medications     amlodipine (NORVASC) 10 MG tablet     aspirin 81 mg Tab     blood sugar diagnostic Strp     calcitRIOL (ROCALTROL) 0.25 MCG Cap     calcium carbonate (OS-HILLARY) 500 mg calcium (1,250 mg) tablet ()     carvedilol (COREG) 12.5 MG tablet     cloNIDine 0.3 mg/24 hr td ptwk (CATAPRES) 0.3 mg/24 hr     docusate sodium (COLACE) 50 MG capsule     losartan (COZAAR) 100 MG tablet     VITAMIN D2 50,000 unit capsule          Chief Complaint   Patient presents with    Consult       Review of patient's allergies indicates:   Allergen Reactions    Hydralazine Other (See Comments)     Pt. states "tiredness."      Ramipril      Other reaction(s): deathly ill  Other reaction(s): deathly ill    Simvastatin      Other reaction(s): deathly ill  Other reaction(s): deathly ill         Past Medical History:   Diagnosis Date    Anemia of chronic renal failure, stage 5     Chronic kidney disease     Diabetes mellitus type II     Diastolic heart failure     ESRD on dialysis since 6/10/16     Essential hypertension     Hyperlipidemia     Hypertension     LVH (left ventricular hypertrophy)     Secondary hyperparathyroidism of renal origin      Past Surgical History:   Procedure Laterality Date    ADRENAL GLAND SURGERY      removed for tumor which she states was benign     HYSTERECTOMY      removed for tumor which she states was benign     permcath placement       Family History   Problem Relation Age of Onset    Pneumonia Brother     Diabetes Paternal Aunt     Kidney disease Neg Hx      Social History   Substance Use Topics    Smoking " status: Never Smoker    Smokeless tobacco: Never Used    Alcohol use No      Comment: quit since she started dialysis; previously was only having wine occasionally and in her younger years would have whiskey/soda, etc        Review of Systems:  Review of Systems   Constitutional: Negative for fever and unexpected weight change.   Respiratory: Positive for cough. Negative for chest tightness and shortness of breath.    Cardiovascular: Negative for chest pain.   Gastrointestinal: Positive for diarrhea. Negative for abdominal pain, constipation, nausea and vomiting.   Genitourinary: Negative for difficulty urinating and dysuria.   Skin: Negative for pallor and rash.   Neurological: Negative for seizures and headaches.   Hematological: Does not bruise/bleed easily.       OBJECTIVE:     Vital Signs (Most Recent)  Temp: 98.6 °F (37 °C) (08/03/17 1520)  Pulse: 74 (08/03/17 1520)  BP: (!) 172/58 (08/03/17 1520)  5' (1.524 m)  58.1 kg (128 lb)     Physical Exam:  Physical Exam   Constitutional: She is oriented to person, place, and time. She appears well-developed and well-nourished.   Cardiovascular: Normal rate.    Murmur heard.   Systolic murmur is present with a grade of 4/6   Pulmonary/Chest: Effort normal and breath sounds normal.   Abdominal: Soft. Bowel sounds are normal. She exhibits no distension. There is no tenderness.       Musculoskeletal:        Arms:  Neurological: She is alert and oriented to person, place, and time.   Skin: Skin is warm and dry.   Psychiatric: She has a normal mood and affect. Her behavior is normal. Judgment and thought content normal.   Vitals reviewed.      Laboratory  CBC: Reviewed  CMP: Reviewed    Diagnostic Results:  X-Ray: Reviewed    ASSESSMENT/PLAN:     ESRD   Occasional constipation and on stool softeners  PLAN:Plan     For lap assisted pd (open on ruq as she thinks she had a left adrenalectomy).  This may not be possible if she has bad adhesions from prior surgery.  Obtain kub  to see if she has constipation and to see if we see any clips in the adrenal bed.  Cardiology clearance.

## 2017-08-03 NOTE — LETTER
[facility] - General Surgery  [address]  [place] [address]  Phone: [phone] August 3, 2017      Emili Sanchez MD  [address]  [place] [address]    Patient: Beth Salcedo   MR Number: [id number]   YOB: 1945   Date of Visit: 8/3/2017     Dear Dr. Sanchez:    Thank you for referring Beth Salcedo to me for evaluation. Below are the relevant portions of my assessment and plan of care.    Patient presents with ESRD.  Occasional constipation and on stool softeners.    PLAN: For lap assisted PD (open on ruq as she thinks she had a left adrenalectomy).  This may not be possible if she has bad adhesions from prior surgery.  Obtain KUB to see if she has constipation and to see if we see any clips in the adrenal bed.  Cardiology clearance    If you have questions, please do not hesitate to call me. I look forward to following Beth along with you.    Sincerely,      Jeffrey Yan MD   Section Head - General, Laparoscopic, Bariatric  Acute Care and Oncologic Surgery   - Surgical Weight Loss Program  [facility]    WSR/niels     CC  Jakob Mock MD

## 2017-08-09 ENCOUNTER — HOSPITAL ENCOUNTER (OUTPATIENT)
Dept: RADIOLOGY | Facility: HOSPITAL | Age: 72
Discharge: HOME | End: 2017-08-09
Attending: SURGERY
Payer: MEDICARE

## 2017-08-09 DIAGNOSIS — N18.6 ESRD (END STAGE RENAL DISEASE) ON DIALYSIS: Chronic | ICD-10-CM

## 2017-08-09 DIAGNOSIS — E89.6 HX OF PARTIAL ADRENALECTOMY: ICD-10-CM

## 2017-08-09 DIAGNOSIS — Z99.2 ESRD (END STAGE RENAL DISEASE) ON DIALYSIS: Chronic | ICD-10-CM

## 2017-08-09 PROCEDURE — 74000 XR ABDOMEN AP 1 VIEW: CPT | Mod: TC

## 2017-08-09 PROCEDURE — 74000 XR ABDOMEN AP 1 VIEW: CPT | Mod: 26,,, | Performed by: RADIOLOGY

## 2017-08-24 ENCOUNTER — TELEPHONE (OUTPATIENT)
Dept: SURGERY | Facility: CLINIC | Age: 72
End: 2017-08-24

## 2017-08-25 ENCOUNTER — TELEPHONE (OUTPATIENT)
Dept: CARDIOLOGY | Facility: CLINIC | Age: 72
End: 2017-08-25

## 2017-08-25 ENCOUNTER — SURGERY (OUTPATIENT)
Age: 72
End: 2017-08-25

## 2017-08-25 PROBLEM — N18.6 ESRD (END STAGE RENAL DISEASE): Status: ACTIVE | Noted: 2017-08-25

## 2017-08-25 NOTE — TELEPHONE ENCOUNTER
Spoke with the pt - says that she was scheduled for an appt to have an access placed in her stomach today- says that they were not able to do the procedure - says that the nurse instructed her to go to the ED - advised the pt to go to the ED if her O2 sats were dropping - pt refused.

## 2017-08-25 NOTE — TELEPHONE ENCOUNTER
----- Message from [employer] sent at 8/25/2017  1:30 PM CDT -----  Contact: pt called  Pt called, to schedule an appointment with Dr. Salas. Pt informed me that her oxygen levels has been dropping. I was unsure if you needed to speak with pt. I scheduled pt with RAJAN Silverman and Dr. Salas on 8/29/17. Pt states she is not in any distress but if needed she will go to ER.Pt of Dr. Salas and LOV 7/5/17Ph for pt is [phone]. Thank you

## 2017-08-26 ENCOUNTER — HOSPITAL ENCOUNTER (INPATIENT)
Facility: HOSPITAL | Age: 72
LOS: 7 days | Discharge: HOME | DRG: 291 | End: 2017-09-02
Attending: EMERGENCY MEDICINE | Admitting: EMERGENCY MEDICINE
Payer: MEDICARE

## 2017-08-26 DIAGNOSIS — R06.02 SOB (SHORTNESS OF BREATH): ICD-10-CM

## 2017-08-26 DIAGNOSIS — Z99.2 TYPE 2 DIABETES MELLITUS WITH CHRONIC KIDNEY DISEASE ON CHRONIC DIALYSIS, WITHOUT LONG-TERM CURRENT USE OF INSULIN: ICD-10-CM

## 2017-08-26 DIAGNOSIS — I15.0 MALIGNANT RENOVASCULAR HYPERTENSION REQUIRING ACUTE INTENSIVE MANAGEMENT: ICD-10-CM

## 2017-08-26 DIAGNOSIS — N18.5 ANEMIA OF CHRONIC RENAL FAILURE, STAGE 5: Chronic | ICD-10-CM

## 2017-08-26 DIAGNOSIS — Z99.2 ESRD (END STAGE RENAL DISEASE) ON DIALYSIS: Chronic | ICD-10-CM

## 2017-08-26 DIAGNOSIS — D63.1 ANEMIA OF CHRONIC RENAL FAILURE, STAGE 5: Chronic | ICD-10-CM

## 2017-08-26 DIAGNOSIS — N18.6 TYPE 2 DIABETES MELLITUS WITH CHRONIC KIDNEY DISEASE ON CHRONIC DIALYSIS, WITHOUT LONG-TERM CURRENT USE OF INSULIN: ICD-10-CM

## 2017-08-26 DIAGNOSIS — J90 PLEURAL EFFUSION: ICD-10-CM

## 2017-08-26 DIAGNOSIS — E11.22 TYPE 2 DIABETES MELLITUS WITH CHRONIC KIDNEY DISEASE ON CHRONIC DIALYSIS, WITHOUT LONG-TERM CURRENT USE OF INSULIN: ICD-10-CM

## 2017-08-26 DIAGNOSIS — I50.32 DIASTOLIC CHF, CHRONIC: ICD-10-CM

## 2017-08-26 DIAGNOSIS — N18.6 ESRD (END STAGE RENAL DISEASE) ON DIALYSIS: Chronic | ICD-10-CM

## 2017-08-26 DIAGNOSIS — I50.43 ACUTE ON CHRONIC COMBINED SYSTOLIC AND DIASTOLIC HEART FAILURE: ICD-10-CM

## 2017-08-26 DIAGNOSIS — J96.01 ACUTE HYPOXEMIC RESPIRATORY FAILURE: ICD-10-CM

## 2017-08-26 LAB
ALBUMIN SERPL BCP-MCNC: 3.6 G/DL
ALP SERPL-CCNC: 132 U/L
ALT SERPL W/O P-5'-P-CCNC: 49 U/L
ANION GAP SERPL CALC-SCNC: 13 MMOL/L
AST SERPL-CCNC: 24 U/L
BASOPHILS # BLD AUTO: 0.07 K/UL
BASOPHILS NFR BLD: 0.9 %
BILIRUB SERPL-MCNC: 0.7 MG/DL
BNP SERPL-MCNC: >4900 PG/ML
BUN SERPL-MCNC: 13 MG/DL
CALCIUM SERPL-MCNC: 8.6 MG/DL
CHLORIDE SERPL-SCNC: 99 MMOL/L
CO2 SERPL-SCNC: 29 MMOL/L
CREAT SERPL-MCNC: 2.4 MG/DL
DIFFERENTIAL METHOD: ABNORMAL
EOSINOPHIL # BLD AUTO: 0.2 K/UL
EOSINOPHIL NFR BLD: 2.5 %
ERYTHROCYTE [DISTWIDTH] IN BLOOD BY AUTOMATED COUNT: 18.3 %
EST. GFR  (AFRICAN AMERICAN): 22.3 ML/MIN/1.73 M^2
EST. GFR  (NON AFRICAN AMERICAN): 19.3 ML/MIN/1.73 M^2
GLUCOSE SERPL-MCNC: 120 MG/DL
HCT VFR BLD AUTO: 36.1 %
HGB BLD-MCNC: 11.4 G/DL
LYMPHOCYTES # BLD AUTO: 1.2 K/UL
LYMPHOCYTES NFR BLD: 14.8 %
MAGNESIUM SERPL-MCNC: 1.8 MG/DL
MCH RBC QN AUTO: 30.6 PG
MCHC RBC AUTO-ENTMCNC: 31.6 G/DL
MCV RBC AUTO: 97 FL
MONOCYTES # BLD AUTO: 0.5 K/UL
MONOCYTES NFR BLD: 6.4 %
NEUTROPHILS # BLD AUTO: 6.1 K/UL
NEUTROPHILS NFR BLD: 75.3 %
PHOSPHATE SERPL-MCNC: 2.3 MG/DL
PLATELET # BLD AUTO: 297 K/UL
PMV BLD AUTO: 12.8 FL
POTASSIUM SERPL-SCNC: 3.5 MMOL/L
PROT SERPL-MCNC: 8 G/DL
RBC # BLD AUTO: 3.72 M/UL
SODIUM SERPL-SCNC: 141 MMOL/L
TROPONIN I SERPL DL<=0.01 NG/ML-MCNC: 0.04 NG/ML
TROPONIN I SERPL DL<=0.01 NG/ML-MCNC: 0.04 NG/ML
WBC # BLD AUTO: 8.12 K/UL

## 2017-08-26 PROCEDURE — 84100 ASSAY OF PHOSPHORUS: CPT

## 2017-08-26 PROCEDURE — 99285 EMERGENCY DEPT VISIT HI MDM: CPT | Mod: 25

## 2017-08-26 PROCEDURE — 83735 ASSAY OF MAGNESIUM: CPT

## 2017-08-26 PROCEDURE — 93010 ELECTROCARDIOGRAM REPORT: CPT | Mod: ,,, | Performed by: INTERNAL MEDICINE

## 2017-08-26 PROCEDURE — 83880 ASSAY OF NATRIURETIC PEPTIDE: CPT

## 2017-08-26 PROCEDURE — 96376 TX/PRO/DX INJ SAME DRUG ADON: CPT

## 2017-08-26 PROCEDURE — 85025 COMPLETE CBC W/AUTO DIFF WBC: CPT

## 2017-08-26 PROCEDURE — 96374 THER/PROPH/DIAG INJ IV PUSH: CPT

## 2017-08-26 PROCEDURE — 80053 COMPREHEN METABOLIC PANEL: CPT

## 2017-08-26 PROCEDURE — 93005 ELECTROCARDIOGRAM TRACING: CPT

## 2017-08-26 PROCEDURE — 84484 ASSAY OF TROPONIN QUANT: CPT

## 2017-08-26 PROCEDURE — 99291 CRITICAL CARE FIRST HOUR: CPT | Mod: ,,, | Performed by: EMERGENCY MEDICINE

## 2017-08-26 PROCEDURE — 99223 1ST HOSP IP/OBS HIGH 75: CPT | Mod: ,,, | Performed by: INTERNAL MEDICINE

## 2017-08-26 PROCEDURE — 63600175 PHARM REV CODE 636 W HCPCS: Performed by: STUDENT IN AN ORGANIZED HEALTH CARE EDUCATION/TRAINING PROGRAM

## 2017-08-26 PROCEDURE — 25000003 PHARM REV CODE 250: Performed by: STUDENT IN AN ORGANIZED HEALTH CARE EDUCATION/TRAINING PROGRAM

## 2017-08-26 PROCEDURE — 20600001 HC STEP DOWN PRIVATE ROOM

## 2017-08-26 PROCEDURE — 96375 TX/PRO/DX INJ NEW DRUG ADDON: CPT

## 2017-08-26 RX ORDER — OXYCODONE HYDROCHLORIDE 5 MG/1
10 TABLET ORAL EVERY 4 HOURS PRN
Status: DISCONTINUED | OUTPATIENT
Start: 2017-08-26 | End: 2017-08-29

## 2017-08-26 RX ORDER — HEPARIN SODIUM 5000 [USP'U]/ML
5000 INJECTION, SOLUTION INTRAVENOUS; SUBCUTANEOUS EVERY 8 HOURS
Status: DISCONTINUED | OUTPATIENT
Start: 2017-08-27 | End: 2017-09-02 | Stop reason: HOSPADM

## 2017-08-26 RX ORDER — ONDANSETRON 8 MG/1
8 TABLET, ORALLY DISINTEGRATING ORAL EVERY 8 HOURS PRN
Status: DISCONTINUED | OUTPATIENT
Start: 2017-08-26 | End: 2017-09-02 | Stop reason: HOSPADM

## 2017-08-26 RX ORDER — LABETALOL HYDROCHLORIDE 5 MG/ML
40 INJECTION, SOLUTION INTRAVENOUS
Status: COMPLETED | OUTPATIENT
Start: 2017-08-26 | End: 2017-08-26

## 2017-08-26 RX ORDER — POLYETHYLENE GLYCOL 3350 17 G/17G
17 POWDER, FOR SOLUTION ORAL 2 TIMES DAILY PRN
Status: DISCONTINUED | OUTPATIENT
Start: 2017-08-26 | End: 2017-09-02 | Stop reason: HOSPADM

## 2017-08-26 RX ORDER — AMLODIPINE BESYLATE 10 MG/1
10 TABLET ORAL
Status: COMPLETED | OUTPATIENT
Start: 2017-08-26 | End: 2017-08-26

## 2017-08-26 RX ORDER — IBUPROFEN 200 MG
24 TABLET ORAL
Status: DISCONTINUED | OUTPATIENT
Start: 2017-08-26 | End: 2017-08-29

## 2017-08-26 RX ORDER — ACETAMINOPHEN 325 MG/1
325 TABLET ORAL EVERY 6 HOURS PRN
Status: DISCONTINUED | OUTPATIENT
Start: 2017-08-26 | End: 2017-08-29

## 2017-08-26 RX ORDER — IBUPROFEN 200 MG
16 TABLET ORAL
Status: DISCONTINUED | OUTPATIENT
Start: 2017-08-26 | End: 2017-08-29

## 2017-08-26 RX ORDER — FUROSEMIDE 10 MG/ML
80 INJECTION INTRAMUSCULAR; INTRAVENOUS
Status: COMPLETED | OUTPATIENT
Start: 2017-08-26 | End: 2017-08-26

## 2017-08-26 RX ORDER — NAPROXEN SODIUM 220 MG/1
81 TABLET, FILM COATED ORAL DAILY
Status: DISCONTINUED | OUTPATIENT
Start: 2017-08-27 | End: 2017-09-02 | Stop reason: HOSPADM

## 2017-08-26 RX ORDER — CLONIDINE HYDROCHLORIDE 0.1 MG/1
0.1 TABLET ORAL ONCE
Status: COMPLETED | OUTPATIENT
Start: 2017-08-26 | End: 2017-08-27

## 2017-08-26 RX ORDER — CALCIUM CARBONATE 500(1250)
500 TABLET ORAL
Status: DISCONTINUED | OUTPATIENT
Start: 2017-08-27 | End: 2017-08-27

## 2017-08-26 RX ORDER — CLONIDINE 0.3 MG/24H
1 PATCH, EXTENDED RELEASE TRANSDERMAL WEEKLY
Status: DISCONTINUED | OUTPATIENT
Start: 2017-08-26 | End: 2017-08-28

## 2017-08-26 RX ORDER — LABETALOL HYDROCHLORIDE 5 MG/ML
20 INJECTION, SOLUTION INTRAVENOUS
Status: COMPLETED | OUTPATIENT
Start: 2017-08-26 | End: 2017-08-26

## 2017-08-26 RX ORDER — GLUCAGON 1 MG
1 KIT INJECTION
Status: DISCONTINUED | OUTPATIENT
Start: 2017-08-26 | End: 2017-08-29

## 2017-08-26 RX ORDER — AMOXICILLIN 250 MG
1 CAPSULE ORAL 2 TIMES DAILY
Status: DISCONTINUED | OUTPATIENT
Start: 2017-08-26 | End: 2017-09-02 | Stop reason: HOSPADM

## 2017-08-26 RX ORDER — PROMETHAZINE HYDROCHLORIDE 25 MG/1
25 TABLET ORAL EVERY 6 HOURS PRN
Status: DISCONTINUED | OUTPATIENT
Start: 2017-08-26 | End: 2017-09-02 | Stop reason: HOSPADM

## 2017-08-26 RX ORDER — LOSARTAN POTASSIUM 50 MG/1
100 TABLET ORAL DAILY
Status: DISCONTINUED | OUTPATIENT
Start: 2017-08-27 | End: 2017-08-30

## 2017-08-26 RX ORDER — NICARDIPINE HYDROCHLORIDE 0.2 MG/ML
2.5 INJECTION INTRAVENOUS CONTINUOUS
Status: DISCONTINUED | OUTPATIENT
Start: 2017-08-26 | End: 2017-08-26

## 2017-08-26 RX ORDER — AMLODIPINE BESYLATE 10 MG/1
10 TABLET ORAL DAILY
Status: DISCONTINUED | OUTPATIENT
Start: 2017-08-27 | End: 2017-08-30

## 2017-08-26 RX ORDER — ERGOCALCIFEROL 1.25 MG/1
50000 CAPSULE ORAL
Status: DISCONTINUED | OUTPATIENT
Start: 2017-08-27 | End: 2017-09-02 | Stop reason: HOSPADM

## 2017-08-26 RX ORDER — CARVEDILOL 12.5 MG/1
12.5 TABLET ORAL 2 TIMES DAILY
Status: DISCONTINUED | OUTPATIENT
Start: 2017-08-26 | End: 2017-08-29

## 2017-08-26 RX ORDER — CALCITRIOL 0.25 UG/1
0.25 CAPSULE ORAL DAILY
Status: DISCONTINUED | OUTPATIENT
Start: 2017-08-27 | End: 2017-08-31

## 2017-08-26 RX ORDER — IPRATROPIUM BROMIDE AND ALBUTEROL SULFATE 2.5; .5 MG/3ML; MG/3ML
3 SOLUTION RESPIRATORY (INHALATION) EVERY 4 HOURS PRN
Status: DISCONTINUED | OUTPATIENT
Start: 2017-08-26 | End: 2017-08-29

## 2017-08-26 RX ORDER — RAMELTEON 8 MG/1
8 TABLET ORAL NIGHTLY PRN
Status: DISCONTINUED | OUTPATIENT
Start: 2017-08-26 | End: 2017-09-02 | Stop reason: HOSPADM

## 2017-08-26 RX ORDER — OXYCODONE HYDROCHLORIDE 5 MG/1
5 TABLET ORAL EVERY 4 HOURS PRN
Status: DISCONTINUED | OUTPATIENT
Start: 2017-08-26 | End: 2017-08-29

## 2017-08-26 RX ADMIN — LABETALOL HYDROCHLORIDE 20 MG: 5 INJECTION, SOLUTION INTRAVENOUS at 03:08

## 2017-08-26 RX ADMIN — AMLODIPINE BESYLATE 10 MG: 10 TABLET ORAL at 05:08

## 2017-08-26 RX ADMIN — FUROSEMIDE 80 MG: 10 INJECTION, SOLUTION INTRAVENOUS at 04:08

## 2017-08-26 RX ADMIN — LABETALOL HYDROCHLORIDE 40 MG: 5 INJECTION, SOLUTION INTRAVENOUS at 04:08

## 2017-08-26 NOTE — ED TRIAGE NOTES
"Beth Salcedo, a 72 y.o. female presents to the ED c/o SOB prior to going to dialysis.  Pt went to dialysis and was dialyzed anyway while on oxygen via NC.  Pt still feeling SOB after dialysis and her room air sat in triage was 85%. Pt denies CP, NVD, palpitaion, fever, or chills      Chief Complaint   Patient presents with    Shortness of Breath     just completed her dialysis session, denies edema,. C/O SOB  even when  on dialysis  Denies chest pain .      Review of patient's allergies indicates:   Allergen Reactions    Hydralazine Other (See Comments)     Pt. states "tiredness."      Ramipril      Other reaction(s): deathly ill  Other reaction(s): deathly ill    Simvastatin      Other reaction(s): deathly ill  Other reaction(s): deathly ill     Past Medical History:   Diagnosis Date    Anemia of chronic renal failure, stage 5     Chronic kidney disease     Diabetes mellitus type II     Diastolic heart failure     ESRD on dialysis since 6/10/16     Essential hypertension     Hyperlipidemia     Hypertension     LVH (left ventricular hypertrophy)     Secondary hyperparathyroidism of renal origin      LOC: Patient name and date of birth verified. The patient is awake, alert and aware of environment with an appropriate affect, the patient is oriented x 3 and speaking appropriately.   APPEARANCE: Patient resting comfortably, patient is clean and well groomed, patient's clothing is properly fastened.  SKIN: The skin is warm and dry, color consistent with ethnicity, patient has normal skin turgor and moist mucus membranes, skin intact, no breakdown or bruising noted.  MUSCULOSKELETAL: Patient moving all extremities well, no obvious swelling or deformities noted.   RESPIRATORY: Respirations are spontaneous, patient has a normal effort and rate, no accessory muscle use noted. SOB reported. Pt on 3 L NC per nurse.   CARDIAC: Patient has a normal rate and rhythm, no periphreal edema noted, " capillary refill < 3 seconds.  ABDOMEN: Soft and non tender to palpation, no distention noted. Bowel sounds present in all four quadrants.  NEUROLOGIC: Eyes open spontaneously, behavior appropriate to situation, follows commands, facial expression symmetrical, bilateral hand grasp equal and even, purposeful motor response noted, normal sensation in all extremities when touched with a finger.

## 2017-08-26 NOTE — ED PROVIDER NOTES
"Encounter Date: 8/26/2017    SCRIBE #1 NOTE: I, Nilsa Baker, am scribing for, and in the presence of,  Dr. Vasquez. I have scribed the following portions of the note - the EKG reading. Other sections scribed: Attending ED notes, Critical Care.       History     Chief Complaint   Patient presents with    Shortness of Breath     just completed her dialysis session, denies edema,. C/O SOB  even when  on dialysis  Denies chest pain .      72F with ESRD on dialysis TRSa, HTN, DM2, dHF, presents to the ED with SOB.  Pt finished dialysis about 30 min prior to arriving to ED. Pt felt hypoxic. Personal pulse ox recorded 74. Measured 84% on arrival. Pt denies CP or leg edema. No LOC. No FND. No vision changes. Pt sitting at 45 degrees, and asking to sit more upright. Pt states that she had to have fluid removed from lungs 12-18m ago. Pt on ASA 81 mg, no other blood thinners at home.           Review of patient's allergies indicates:   Allergen Reactions    Hydralazine Other (See Comments)     Pt. states "tiredness."      Ramipril      Other reaction(s): deathly ill  Other reaction(s): deathly ill    Simvastatin      Other reaction(s): deathly ill  Other reaction(s): deathly ill     Past Medical History:   Diagnosis Date    Anemia of chronic renal failure, stage 5     Chronic kidney disease     Diabetes mellitus type II     Diastolic heart failure     ESRD on dialysis since 6/10/16     Essential hypertension     Hyperlipidemia     Hypertension     LVH (left ventricular hypertrophy)     Secondary hyperparathyroidism of renal origin      Past Surgical History:   Procedure Laterality Date    ADRENAL GLAND SURGERY  1990s    removed for tumor which she states was benign     HYSTERECTOMY  1990s    removed for tumor which she states was benign     permcath placement       Family History   Problem Relation Age of Onset    Pneumonia Brother     Diabetes Paternal Aunt     Kidney disease Neg Hx      Social History "   Substance Use Topics    Smoking status: Never Smoker    Smokeless tobacco: Never Used    Alcohol use No      Comment: quit since she started dialysis; previously was only having wine occasionally and in her younger years would have whiskey/soda, etc     Review of Systems   Constitutional: Negative for activity change and fever.   Eyes: Negative for visual disturbance.   Respiratory: Positive for shortness of breath. Negative for chest tightness.    Cardiovascular: Negative for chest pain, palpitations and leg swelling.   Gastrointestinal: Negative for abdominal distention, abdominal pain, blood in stool, constipation, diarrhea, nausea and vomiting.   Genitourinary: Negative for difficulty urinating, dysuria and hematuria.   Musculoskeletal: Negative for arthralgias.   Neurological: Negative for dizziness, syncope and headaches.   Psychiatric/Behavioral: Negative for confusion.       Physical Exam     Initial Vitals [08/26/17 1429]   BP Pulse Resp Temp SpO2   (!) 211/93 84 20 98.7 °F (37.1 °C) (!) 84 %      MAP       132.33         Physical Exam    Constitutional: She appears well-developed and well-nourished. She is not diaphoretic. No distress.   HENT:   Head: Normocephalic and atraumatic.   Eyes: Pupils are equal, round, and reactive to light.   Neck: Neck supple.   Cardiovascular: Normal rate and regular rhythm.   Murmur heard.  Pulmonary/Chest: Breath sounds normal. She has no wheezes. She has no rales.   Abdominal: Soft. She exhibits no distension and no mass. There is no tenderness.   Neurological: She is oriented to person, place, and time.   Skin: Skin is warm and dry. Capillary refill takes less than 2 seconds.   Psychiatric: She has a normal mood and affect. Thought content normal.         ED Course   Critical Care  Date/Time: 8/27/2017 10:36 AM  Performed by: PHILIPPE CARDOZO.  Authorized by: PHILIPPE CARDOZO   Direct patient critical care time: 34 minutes  Additional history critical care time: 3  minutes  Ordering / reviewing critical care time: 3 minutes  Documentation critical care time: 3 minutes  Consulting other physicians critical care time: 3 minutes  Total critical care time (exclusive of procedural time) : 46 minutes        Labs Reviewed   CBC W/ AUTO DIFFERENTIAL - Abnormal; Notable for the following:        Result Value    RBC 3.72 (*)     Hemoglobin 11.4 (*)     Hematocrit 36.1 (*)     MCHC 31.6 (*)     RDW 18.3 (*)     Gran% 75.3 (*)     Lymph% 14.8 (*)     All other components within normal limits   COMPREHENSIVE METABOLIC PANEL - Abnormal; Notable for the following:     Glucose 120 (*)     Creatinine 2.4 (*)     Calcium 8.6 (*)     ALT 49 (*)     eGFR if  22.3 (*)     eGFR if non  19.3 (*)     All other components within normal limits   TROPONIN I - Abnormal; Notable for the following:     Troponin I 0.045 (*)     All other components within normal limits   B-TYPE NATRIURETIC PEPTIDE - Abnormal; Notable for the following:     BNP >4,900 (*)     All other components within normal limits   PHOSPHORUS - Abnormal; Notable for the following:     Phosphorus 2.3 (*)     All other components within normal limits   TROPONIN I - Abnormal; Notable for the following:     Troponin I 0.035 (*)     All other components within normal limits   MAGNESIUM     EKG Readings: (Independently Interpreted)   Normal sinus rhythm at 82. No ischemic changes.          Medical Decision Making:   History:   Old Medical Records: I decided to obtain old medical records.  Initial Assessment:   Pulmonary Edema  Differential Diagnosis:   Acute on chronic CHF  Hypertensive crisis  Pulmonary Embolism  Post-Dialysis sequela  Anemia  CVA  Independently Interpreted Test(s):   I have ordered and independently interpreted EKG Reading(s) - see prior notes  Clinical Tests:   Lab Tests: Reviewed and Ordered  Radiological Study: Reviewed and Ordered  Medical Tests: Reviewed and Ordered  ED Management:  1500-  "Pt History, ROS, and PE performed  Basic labs, cardio workup, and CXR ordered.  1530- Pt /92, IV labetalol 20 mg ordered.   1600- CXR showed R pleural effusion, and pulmonary edema  1615- Pt /81, Ordered IV labetalol 40 mg. And IV lasix 80 mg.   1700- Discussed with Nephrology. Since dialyzed today already. Rec Chest tube and drainage instead.   Adding home Norvasc 10mg to regiment.   1815- /77. Going to start Cardene gtt 2.5 mg titrate to 170 SBP, and admit to inpatient for possible chest tube.   1900- Pt refusing cardene drip, pt does not want to go to the ICU. ICU team met with her and said "she was nearly combative."   1930-Pt  SBP currently. Going to admit to IM, pt may undergo dialysis again tomorrow versus chest tube.     Trip Spear MD  Internal Medicine PGY-1  [phone] 8/29/17 @2089            Scribe Attestation:   Scribe #1: I performed the above scribed service and the documentation accurately describes the services I performed. I attest to the accuracy of the note.    Attending Attestation:         Attending Critical Care:   Critical Care Times:   Direct Patient Care (initial evaluation, reassessments, and time considering the case)................................................................20 minutes.   Additional History from reviewing old medical records or taking additional history from the family, EMS, PCP, etc.......................4 minutes.   Ordering, Reviewing, and Interpreting Diagnostic Studies...............................................................................................................4 minutes.   Documentation..................................................................................................................................................................................4 minutes.   Consultation with other Physicians. " .................................................................................................................................................4 minutes.   ==============================================================  · Total Critical Care Time - exclusive of procedural time: 36 minutes.  ==============================================================    Physician Attestation for Scribe:  Physician Attestation Statement for Scribe #1: I, Dr. Vasquez, reviewed documentation, as scribed by Nilsa Baker in my presence, and it is both accurate and complete.         Attending ED Notes:   4:12 PM  Reviewed CXR that shows pulmonary edema and right pleural effusion. Will continue to work on lowering her blood pressure. Will giver her diuretic and contact nephrology.          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    Disposition:   Disposition: Admitted  Condition: Serious                        Robert Vasquez MD  08/27/17 1037

## 2017-08-27 PROBLEM — R91.8 PULMONARY NODULES: Status: ACTIVE | Noted: 2017-08-27

## 2017-08-27 PROBLEM — D35.00 ADRENAL ADENOMA: Status: ACTIVE | Noted: 2017-08-27

## 2017-08-27 PROBLEM — I50.43 ACUTE ON CHRONIC COMBINED SYSTOLIC AND DIASTOLIC HEART FAILURE: Status: ACTIVE | Noted: 2017-08-27

## 2017-08-27 PROBLEM — J96.01 ACUTE HYPOXEMIC RESPIRATORY FAILURE: Status: ACTIVE | Noted: 2017-08-27

## 2017-08-27 PROBLEM — J90 PLEURAL EFFUSION: Status: ACTIVE | Noted: 2017-08-27

## 2017-08-27 PROBLEM — I15.0 MALIGNANT RENOVASCULAR HYPERTENSION REQUIRING ACUTE INTENSIVE MANAGEMENT: Status: ACTIVE | Noted: 2017-08-27

## 2017-08-27 LAB
ALLENS TEST: ABNORMAL
ANION GAP SERPL CALC-SCNC: 14 MMOL/L
BASOPHILS # BLD AUTO: 0.06 K/UL
BASOPHILS NFR BLD: 0.9 %
BUN SERPL-MCNC: 28 MG/DL
CALCIUM SERPL-MCNC: 8.7 MG/DL
CHLORIDE SERPL-SCNC: 98 MMOL/L
CO2 SERPL-SCNC: 30 MMOL/L
CREAT SERPL-MCNC: 4.3 MG/DL
DELSYS: ABNORMAL
DIFFERENTIAL METHOD: ABNORMAL
EOSINOPHIL # BLD AUTO: 0.2 K/UL
EOSINOPHIL NFR BLD: 3.3 %
ERYTHROCYTE [DISTWIDTH] IN BLOOD BY AUTOMATED COUNT: 18.5 %
ERYTHROCYTE [SEDIMENTATION RATE] IN BLOOD BY WESTERGREN METHOD: 12 MM/H
EST. GFR  (AFRICAN AMERICAN): 11 ML/MIN/1.73 M^2
EST. GFR  (NON AFRICAN AMERICAN): 9.5 ML/MIN/1.73 M^2
GLUCOSE SERPL-MCNC: 150 MG/DL
HCO3 UR-SCNC: 38.8 MMOL/L (ref 24–28)
HCT VFR BLD AUTO: 32.3 %
HGB BLD-MCNC: 10.2 G/DL
LYMPHOCYTES # BLD AUTO: 1 K/UL
LYMPHOCYTES NFR BLD: 13.9 %
MAGNESIUM SERPL-MCNC: 1.9 MG/DL
MCH RBC QN AUTO: 31 PG
MCHC RBC AUTO-ENTMCNC: 31.6 G/DL
MCV RBC AUTO: 98 FL
MODE: ABNORMAL
MONOCYTES # BLD AUTO: 0.6 K/UL
MONOCYTES NFR BLD: 8.5 %
NEUTROPHILS # BLD AUTO: 5.1 K/UL
NEUTROPHILS NFR BLD: 73.3 %
PCO2 BLDA: 52.1 MMHG (ref 35–45)
PH SMN: 7.48 [PH] (ref 7.35–7.45)
PHOSPHATE SERPL-MCNC: 4.9 MG/DL
PLATELET # BLD AUTO: 279 K/UL
PMV BLD AUTO: 12.3 FL
PO2 BLDA: 42 MMHG (ref 80–100)
POC BE: 15 MMOL/L
POC SATURATED O2: 80 % (ref 95–100)
POC TCO2: 40 MMOL/L (ref 23–27)
POTASSIUM SERPL-SCNC: 4.2 MMOL/L
RBC # BLD AUTO: 3.29 M/UL
SAMPLE: ABNORMAL
SITE: ABNORMAL
SODIUM SERPL-SCNC: 142 MMOL/L
SP02: 77
WBC # BLD AUTO: 6.96 K/UL

## 2017-08-27 PROCEDURE — 80048 BASIC METABOLIC PNL TOTAL CA: CPT

## 2017-08-27 PROCEDURE — 83735 ASSAY OF MAGNESIUM: CPT

## 2017-08-27 PROCEDURE — 25000003 PHARM REV CODE 250: Performed by: HOSPITALIST

## 2017-08-27 PROCEDURE — 85025 COMPLETE CBC W/AUTO DIFF WBC: CPT

## 2017-08-27 PROCEDURE — 99900035 HC TECH TIME PER 15 MIN (STAT)

## 2017-08-27 PROCEDURE — 93306 TTE W/DOPPLER COMPLETE: CPT

## 2017-08-27 PROCEDURE — 99233 SBSQ HOSP IP/OBS HIGH 50: CPT | Mod: ,,, | Performed by: HOSPITALIST

## 2017-08-27 PROCEDURE — 84100 ASSAY OF PHOSPHORUS: CPT

## 2017-08-27 PROCEDURE — 25000003 PHARM REV CODE 250: Performed by: INTERNAL MEDICINE

## 2017-08-27 PROCEDURE — 99223 1ST HOSP IP/OBS HIGH 75: CPT | Mod: ,,, | Performed by: INTERNAL MEDICINE

## 2017-08-27 PROCEDURE — 20600001 HC STEP DOWN PRIVATE ROOM

## 2017-08-27 PROCEDURE — 36415 COLL VENOUS BLD VENIPUNCTURE: CPT

## 2017-08-27 PROCEDURE — 93306 TTE W/DOPPLER COMPLETE: CPT | Mod: 26,,, | Performed by: INTERNAL MEDICINE

## 2017-08-27 PROCEDURE — 25000003 PHARM REV CODE 250: Performed by: NURSE PRACTITIONER

## 2017-08-27 PROCEDURE — 36600 WITHDRAWAL OF ARTERIAL BLOOD: CPT

## 2017-08-27 PROCEDURE — 82803 BLOOD GASES ANY COMBINATION: CPT

## 2017-08-27 PROCEDURE — 63600175 PHARM REV CODE 636 W HCPCS: Performed by: INTERNAL MEDICINE

## 2017-08-27 PROCEDURE — 80100014 HC HEMODIALYSIS 1:1

## 2017-08-27 RX ORDER — SODIUM CHLORIDE 9 MG/ML
INJECTION, SOLUTION INTRAVENOUS
Status: DISCONTINUED | OUTPATIENT
Start: 2017-08-28 | End: 2017-08-29

## 2017-08-27 RX ORDER — SEVELAMER CARBONATE 800 MG/1
1600 TABLET, FILM COATED ORAL
Status: DISCONTINUED | OUTPATIENT
Start: 2017-08-27 | End: 2017-09-02 | Stop reason: HOSPADM

## 2017-08-27 RX ORDER — CLONIDINE HYDROCHLORIDE 0.1 MG/1
0.1 TABLET ORAL ONCE
Status: COMPLETED | OUTPATIENT
Start: 2017-08-27 | End: 2017-08-27

## 2017-08-27 RX ORDER — SODIUM CHLORIDE 9 MG/ML
INJECTION, SOLUTION INTRAVENOUS
Status: DISCONTINUED | OUTPATIENT
Start: 2017-08-28 | End: 2017-08-27

## 2017-08-27 RX ADMIN — AMLODIPINE BESYLATE 10 MG: 10 TABLET ORAL at 08:08

## 2017-08-27 RX ADMIN — SEVELAMER CARBONATE 1600 MG: 800 TABLET, FILM COATED ORAL at 01:08

## 2017-08-27 RX ADMIN — NITROGLYCERIN 0.5 INCH: 20 OINTMENT TOPICAL at 05:08

## 2017-08-27 RX ADMIN — STANDARDIZED SENNA CONCENTRATE AND DOCUSATE SODIUM 1 TABLET: 8.6; 5 TABLET, FILM COATED ORAL at 08:08

## 2017-08-27 RX ADMIN — SEVELAMER CARBONATE 1600 MG: 800 TABLET, FILM COATED ORAL at 06:08

## 2017-08-27 RX ADMIN — ERGOCALCIFEROL 50000 UNITS: 1.25 CAPSULE ORAL at 08:08

## 2017-08-27 RX ADMIN — CLONIDINE HYDROCHLORIDE 0.1 MG: 0.1 TABLET ORAL at 10:08

## 2017-08-27 RX ADMIN — STANDARDIZED SENNA CONCENTRATE AND DOCUSATE SODIUM 1 TABLET: 8.6; 5 TABLET, FILM COATED ORAL at 09:08

## 2017-08-27 RX ADMIN — ASPIRIN 81 MG CHEWABLE TABLET 81 MG: 81 TABLET CHEWABLE at 08:08

## 2017-08-27 RX ADMIN — CARVEDILOL 12.5 MG: 12.5 TABLET, FILM COATED ORAL at 12:08

## 2017-08-27 RX ADMIN — STANDARDIZED SENNA CONCENTRATE AND DOCUSATE SODIUM 1 TABLET: 8.6; 5 TABLET, FILM COATED ORAL at 12:08

## 2017-08-27 RX ADMIN — CLONIDINE HYDROCHLORIDE 0.1 MG: 0.1 TABLET ORAL at 12:08

## 2017-08-27 RX ADMIN — HEPARIN SODIUM 5000 UNITS: 5000 INJECTION, SOLUTION INTRAVENOUS; SUBCUTANEOUS at 01:08

## 2017-08-27 RX ADMIN — HEPARIN SODIUM 5000 UNITS: 5000 INJECTION, SOLUTION INTRAVENOUS; SUBCUTANEOUS at 05:08

## 2017-08-27 RX ADMIN — CALCITRIOL 0.25 MCG: 0.25 CAPSULE, LIQUID FILLED ORAL at 08:08

## 2017-08-27 RX ADMIN — LOSARTAN POTASSIUM 100 MG: 50 TABLET, FILM COATED ORAL at 08:08

## 2017-08-27 RX ADMIN — HEPARIN SODIUM 5000 UNITS: 5000 INJECTION, SOLUTION INTRAVENOUS; SUBCUTANEOUS at 09:08

## 2017-08-27 RX ADMIN — CALCIUM 500 MG: 500 TABLET ORAL at 08:08

## 2017-08-27 NOTE — PROGRESS NOTES
Progress Note  Hospital Medicine    Admit Date: 8/26/2017  Length of Stay:  LOS: 1 day     SUBJECTIVE:         Follow-up For:  Acute hypoxemic respiratory failure    HPI/Interval history (See H&P for complete P,F,SHx) :     08/27/17  CT chest - Moderate sized bilateral pleural effusions, right greater than left with associated atelectatic change and bandlike opacities, most prominent within the right lower lobe. s/p pulmonology evaluation.Prior pleural tap 06/16- transudative. likely from worsenign diastolic CHF, no plan for thoracentesis. on 3L oxygen via nasal canula       Review of Systems: List if applicable  Pain scale: 010  Constitutional- Positive for Fever, Weakness  Resp- Positive for Cough, SOB      OBJECTIVE:     Vital Signs Range (Last 24H):  Temp:  [96.9 °F (36.1 °C)-98.7 °F (37.1 °C)]   Pulse:  [53-84]   Resp:  [18-20]   BP: (164-211)/(50-97)   SpO2:  [77 %-98 %]     Physical Exam:  General- Patient alert and oriented x3   HEENT- PERRLA, EOMI, OP clear  Neck- No JVD, Lymphadenopathy, Thyromegaly  CV- Regular rate and rhythm, No Murmur/christine/rubs  Resp- Lungs CTA Bilaterally, No increased WOB  Abdomen- Non tender/non-distended, BS normoactive x4 quads, no HSM  Extrem- No cyanosis, clubbing, edema.   Skin- No rashes, lesions, ulcers  Neuro- Strength 5/5 flexors/extensors,Intact sensation to light touch grossly      Medications:  Medication list was reviewed and changes noted under Assessment/Plan.      Current Facility-Administered Medications:     acetaminophen tablet 325 mg, 325 mg, Oral, Q6H PRN, PAM Jung MD    albuterol-ipratropium 2.5mg-0.5mg/3mL nebulizer solution 3 mL, 3 mL, Nebulization, Q4H PRN, PAM Jung MD    amlodipine tablet 10 mg, 10 mg, Oral, Daily, PAM Jung MD, 10 mg at 08/27/17 0802    aspirin chewable tablet 81 mg, 81 mg, Oral, Daily, PAM Jung MD, 81 mg at 08/27/17 0802    calcitRIOL capsule 0.25 mcg, 0.25 mcg, Oral, Daily, PAM Jung MD, 0.25  mcg at 08/27/17 0802    carvedilol tablet 12.5 mg, 12.5 mg, Oral, BID, PAM Jung MD, Stopped at 08/27/17 0802    cloNIDine 0.3 mg/24 hr td ptwk 1 patch, 1 patch, Transdermal, Weekly, PAM Jung MD, Stopped at 08/26/17 2215    dextrose 50% injection 12.5 g, 12.5 g, Intravenous, PRN, PAM Jung MD    dextrose 50% injection 25 g, 25 g, Intravenous, PRN, PAM Jung MD    ergocalciferol capsule 50,000 Units, 50,000 Units, Oral, Q7 Days, PAM Jung MD, 50,000 Units at 08/27/17 0801    glucagon (human recombinant) injection 1 mg, 1 mg, Intramuscular, PRN, PAM Jung MD    glucose chewable tablet 16 g, 16 g, Oral, PRN, PAM Jung MD    glucose chewable tablet 24 g, 24 g, Oral, PRN, PAM Jung MD    heparin (porcine) injection 5,000 Units, 5,000 Units, Subcutaneous, Q8H, PAM Jung MD, 5,000 Units at 08/27/17 0539    losartan tablet 100 mg, 100 mg, Oral, Daily, PAM Jung MD, 100 mg at 08/27/17 0802    ondansetron disintegrating tablet 8 mg, 8 mg, Oral, Q8H PRN, PAM Jung MD    oxycodone immediate release tablet 10 mg, 10 mg, Oral, Q4H PRN, PAM Jung MD    oxycodone immediate release tablet 5 mg, 5 mg, Oral, Q4H PRN, PAM Jung MD    polyethylene glycol packet 17 g, 17 g, Oral, BID PRN, PAM Jung MD    promethazine tablet 25 mg, 25 mg, Oral, Q6H PRN, PAM Jung MD    ramelteon tablet 8 mg, 8 mg, Oral, Nightly PRN, PAM Jung MD    senna-docusate 8.6-50 mg per tablet 1 tablet, 1 tablet, Oral, BID, PAM Jung MD, 1 tablet at 08/27/17 0801    sevelamer carbonate tablet 1,600 mg, 1,600 mg, Oral, TID WM, Alfred Ernandez MD    acetaminophen, albuterol-ipratropium 2.5mg-0.5mg/3mL, dextrose 50%, dextrose 50%, glucagon (human recombinant), glucose, glucose, ondansetron, oxycodone, oxycodone, polyethylene glycol, promethazine, ramelteon    Laboratory/Diagnostic Data:  Reviewed and noted in plan where  applicable- Please see chart for full lab data.      Recent Labs  Lab 08/26/17  1517 08/27/17  0503   WBC 8.12 6.96   HGB 11.4* 10.2*   HCT 36.1* 32.3*    279         Recent Labs  Lab 08/25/17  0645 08/26/17  1517 08/27/17  0503   * 141 142   K 5.0 3.5 4.2   CL 96 99 98   CO2 26 29 30*   BUN 35* 13 28*   CREATININE 5.0* 2.4* 4.3*   * 120* 150*   CALCIUM 8.9 8.6* 8.7   MG  --  1.8 1.9   PHOS  --  2.3* 4.9*         Recent Labs  Lab 08/26/17  1517   ALKPHOS 132   ALT 49*   AST 24   ALBUMIN 3.6   PROT 8.0   BILITOT 0.7        Microbiology labs for the last week  Microbiology Results (last 7 days)     ** No results found for the last 168 hours. **           Imaging Results          CT Chest Without Contrast (Final result)     Abnormal  Result time 08/26/17 23:27:55    Final result by Richie Mata MD (08/26/17 23:27:55)                 Impression:        1.  Moderate sized bilateral pleural effusions, right greater than left with associated atelectatic change and bandlike opacities, most prominent within the right lower lobe.    2.  Multiple bilateral pulmonary nodules, the largest of which measures 0.7 x 0.5 cm within the apical segment of the right upper lobe (axial series 2, image 32). Per Fleischner Society 2017 guidelines, in a low or high risk patient: CT chest 3-6 months with followup CT chest in 18-24 months.    3.  Extensive coronary and aortic atherosclerosis.    4. Low-density nodular thickening of the right adrenal gland, favored to represent an adenoma.    5.  Additional findings include calcifications of the mitral and aortic valve, postsurgical changes in the region of the left adrenal gland, degenerative change of the spine, renal atrophy and right renal hypodensities which are too small to characterize.      This report has been flagged in the Epic medical record system.  ______________________________________     Electronically signed by resident: KILEY LEMOS MD  Date:      08/26/17  Time:    23:13            As the supervising and teaching physician, I personally reviewed the images and resident's interpretation and I agree with the findings.          Electronically signed by: Richie Mata  Date:     08/26/17  Time:    23:27              Narrative:    Time of Procedure: 08/26/17 22:20:22  Accession # [id number]    Technique: The chest was surveyed from the lung apices through the costophrenic angles without the use of intravenous contrast material.  Data was reformatted for contiguous 5 mm images in the axial, sagittal, and coronal planes.  Data was post processed for extraction of 1 mm images for effective high-resolution CT imaging without additional radiation to the patient.    Comparison: Chest radiograph 8/26/2017.      Findings:    The structures at the base of the neck reveal no convincing evidence of disease.    There is a left-sided aortic arch with three branch vessels.  The thoracic aorta maintains normal caliber, contour, and course with significant atherosclerotic calcification within its course.      The heart is enlarged and there is no evidence of pericardial effusion.  There is significant radiopaque material within the mitral and aortic valves, which may correlate with extensive calcification versus prosthesis.  There is extensive calcification of the coronary arteries.    The pulmonary arteries distribute normally.  There are four pulmonary veins.  Systemic and pulmonary venoatrial connections are concordant.    The trachea and proximal airways are patent.    The lungs demonstrate bilateral pleural fluid, greatest on the right with associated atelectatic change.  Additional bandlike opacities are present bilaterally, most prominent within the right lower lobe.  Multiple bilateral pulmonary nodules are identified, the largest on the right measuring 0.7 x 0.5 cm within the apical segment of the right upper lobe (axial series 2, image 32).  The largest on the left  measures 0.5 x 0.4 cm within the anterior segment of the left upper lobe (axial series 2, image 37).  No pneumothorax or significant pleural thickening.    There is no axillary or mediastinal lymph node enlargement.  The hilar contours are unremarkable on this non-contrast examination.    The esophagus maintains a normal course and caliber.    Limited views of the abdomen demonstrate significant renal atrophy with left renal hypodensities which are too small to characterize.  Surgical material is seen within the region of the left adrenal gland in this patient with a history of prior adrenal surgery.  Nodular hypodensity of the right adrenal gland measuring at least 1.7 cm.  Although incompletely characterized, the density measures approximately 0 HU (series 2, image 119), consistent with an adenoma.     The osseous structures demonstrate degenerative change of the spine.                             X-Ray Chest 1 View (Final result)  Result time 08/26/17 16:15:11    Final result by Dustin Delacruz MD (08/26/17 16:15:11)                 Impression:     No significant interval change      Electronically signed by: Dr. DUSTIN DELACRUZ MD  Date:     08/26/17  Time:    16:15              Narrative:    Comparison: 12/13/2016    Results: Single view. Right sided central venous catheter has been removed. Cardiac silhouette is unchanged. Atherosclerotic calcification in the wall of the aortic arch. Pulmonary vascularity is prominent but stable. Continued mild blunting of both costophrenic angles with a little increased opacity at both lung bases likely representing mild volume loss/consolidation. No pneumothorax.                              Estimated body mass index is 27.34 kg/m² as calculated from the following:    Height as of this encounter: 5' (1.524 m).    Weight as of this encounter: 63.5 kg (139 lb 15.9 oz).    I & O (Last 24H):    Intake/Output Summary (Last 24 hours) at 08/27/17 1304  Last data filed at 08/27/17 0600    Gross per 24 hour   Intake                0 ml   Output                0 ml   Net                0 ml       Estimated Creatinine Clearance: 9.5 mL/min (based on Cr of 4.3).    ASSESSMENT/PLAN:     Active Problems:    Active Hospital Problems    Diagnosis  POA    *Acute hypoxemic respiratory failure [J96.01]CT chest - Moderate sized bilateral pleural effusions, right greater than left with associated atelectatic change and bandlike opacities, most prominent within the right lower lobe. s/p pulmonology evaluation.Prior pleural tap 06/16- transudative. likely from worsenign diastolic CHF, no plan for thoracentesis. on 3L oxygen via nasal canula.taper as tolerated. May need home oxygen   Yes    Acute on chronic combined systolic and diastolic heart failure [I50.43]BNP 5k. makes little urine. Nephrology consulted . Echo pending. 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 SBP at 160/54. continue clonidine, losartan , carvedilol  Yes    Pleural effusion [J90]as above   Yes    Pulmonary nodules [R91.8]  Unknown     Multiple bilateral pulmonary nodules, the largest of which measures 0.7 x 0.5 cm within the apical segment of the right upper lobe (axial series 2, image 32). Per Fleischner Society 2017 guidelines, in a low or high risk patient: CT chest 3-6 months with followup CT chest in 18-24 months.      Adrenal adenoma [D35.00]  Unknown     Low-density nodular thickening of the right adrenal gland      SOB (shortness of breath) [R06.02]likley secondary to pleural effusions   Yes    ESRD (end stage renal disease) on dialysis [N18.6, Z99.2]Nephrology consulted for HD. had full session yesterday but no fluid removed. For HD in AM   Not Applicable     Chronic    Anemia of chronic renal failure, stage 5 [N18.5, D63.1]  Hb of 10.2 stable. monitor   Yes     Chronic    Type II diabetes mellitus with renal manifestations  [E11.29]hb A1c of 6.4. not on medications at home. diet controlled   Yes      Resolved Hospital Problems    Diagnosis Date Resolved POA   No resolved problems to display.         Disposition- Home     DVT prophylaxis addressed with:SQ heparin             Alfred Ernandez MD  Attending Staff Physician  Hospital Medicine  pager- [phone] [id number] - [phone]

## 2017-08-27 NOTE — H&P
"[facility] Medicine  History & Physical    Patient Name: Beth Salcedo  MRN: [id number]  Admission Date: 8/26/2017  Attending Physician: Alfred Ernandez MD  Primary Care Provider: Emili Sanchez MD    [facility] Medicine Team: Networked reference to record PCT  W Edilberto Jung MD     Patient information was obtained from patient, relative(s), past medical records and ER records.     Subjective:     Principal Problem: Acute hypoxic respiratory failure in patient with DCHF and ESRD    Chief Complaint:   Chief Complaint   Patient presents with    Shortness of Breath     just completed her dialysis session, denies edema,. C/O SOB  even when  on dialysis  Denies chest pain .         HPI: Ms. Salcedo is a 71yo lady with a past medical history of ESRD on HD since 6/10/16, dialyzed T, Th and Saturday.  She also has HTN, DCHF and DM2.  She is dialyzed at [facility] on [facility] and underwent her full 4 hours today, though no fluid was removed as she was at her dry weight (she does not know what that is).  After HD she took her oxygen monitor and checked her sats in the car and it was 74%, so she came straight to the ED.  She states that over the past week it has been running in the low 80's.  She states that she has been experiencing "shortness of breath, so I was taking it more often."    She came for preop with Dr. Yan yesterday, but the oxygen saturation was in the 80's, so the procedure was cancelled.  She was encouraged to come to the ED at that time, but she refused to go because, "I just didn't want to sit in there all day; I knew it would happen, just like it is now!"  She denies any chest pain with the shortness of breath.  She does have a chronic cough with mucous, but this is unchanged for her.  She has had no fever or chills, but is, "always cold."  All of the more recent symptoms of dyspnea have come on gradually a little over a week ago, but were not sudden " "in onset, nor are they associated with pleuritic chest pains.        Her daughter notes that her mother, "is much more slow with walking since last Monday, but didn't think too much about it."    Past Medical History:   Diagnosis Date    Anemia of chronic renal failure, stage 5     Chronic kidney disease     Diabetes mellitus type II     Diastolic heart failure     ESRD on dialysis since 6/10/16     Essential hypertension     Hyperlipidemia     Hypertension     LVH (left ventricular hypertrophy)     Secondary hyperparathyroidism of renal origin        Past Surgical History:   Procedure Laterality Date    ADRENAL GLAND SURGERY  1990s    removed for tumor which she states was benign     HYSTERECTOMY  1990s    removed for tumor which she states was benign     permcath placement         Review of patient's allergies indicates:   Allergen Reactions    Hydralazine Other (See Comments)     Pt. states "tiredness."      Ramipril      Other reaction(s): deathly ill  Other reaction(s): deathly ill    Simvastatin      Other reaction(s): deathly ill  Other reaction(s): deathly ill       No current facility-administered medications on file prior to encounter.      Current Outpatient Prescriptions on File Prior to Encounter   Medication Sig    amlodipine (NORVASC) 10 MG tablet TAKE ONE DAILY GENERIC NORVASC (Patient taking differently: TAKE ONE DAILY)    aspirin 81 mg Tab Take 1 tablet by mouth Daily.    blood sugar diagnostic Strp Test 3 times daily. Please dispense 1 meter covered by insurance. Meter, Strips, and lancets    calcitRIOL (ROCALTROL) 0.25 MCG Cap Take 1 capsule (0.25 mcg total) by mouth once daily.    carvedilol (COREG) 12.5 MG tablet Take 1 tablet (12.5 mg total) by mouth 2 (two) times daily.    cloNIDine 0.3 mg/24 hr td ptwk (CATAPRES) 0.3 mg/24 hr Place 1 patch onto the skin once a week.     docusate sodium (COLACE) 50 MG capsule Take 1 capsule (50 mg total) by mouth 2 (two) times daily. " (Patient taking differently: Take 50 mg by mouth as needed. )    losartan (COZAAR) 100 MG tablet Take 1 tablet (100 mg total) by mouth once daily.    VITAMIN D2 50,000 unit capsule ONE EVERY WEEK (Patient taking differently: PT TAKEN EVERY TUES,THUR ,SAT)    calcium carbonate (OS-HILLARY) 500 mg calcium (1,250 mg) tablet Take 1 tablet (500 mg total) by mouth 3 (three) times daily with meals.     Family History     Problem Relation (Age of Onset)    Diabetes Paternal Aunt    Pneumonia Brother        Social History Main Topics    Smoking status: Never Smoker    Smokeless tobacco: Never Used    Alcohol use No      Comment: quit since she started dialysis; previously was only having wine occasionally and in her younger years would have whiskey/soda, etc    Drug use: No    Sexual activity: Not on file     Review of Systems   Constitutional: Positive for activity change and fatigue. Negative for chills and fever.   HENT: Negative for congestion.    Respiratory: Positive for shortness of breath. Negative for cough.    Cardiovascular: Negative for chest pain.   Gastrointestinal: Negative for abdominal pain, constipation, diarrhea, nausea and vomiting.   Endocrine: Positive for cold intolerance.   Neurological: Negative for dizziness and weakness.   Psychiatric/Behavioral: Positive for dysphoric mood. Negative for confusion. The patient is not nervous/anxious.      Objective:     Vital Signs (Most Recent):  Temp: 98.7 °F (37.1 °C) (08/26/17 1429)  Pulse: 62 (08/26/17 2131)  Resp: 20 (08/26/17 1429)  BP: (!) 204/80 (08/26/17 2131)  SpO2: (!) 93 % (08/26/17 2131) Vital Signs (24h Range):  Temp:  [98.7 °F (37.1 °C)] 98.7 °F (37.1 °C)  Pulse:  [55-84] 62  Resp:  [20] 20  SpO2:  [84 %-98 %] 93 %  BP: (171-211)/(73-93) 204/80     Weight: 62.6 kg (138 lb)  Body mass index is 26.95 kg/m².    Physical Exam   Constitutional: She is oriented to person, place, and time. She appears well-developed and well-nourished.  Non-toxic  appearance. She does not have a sickly appearance. She does not appear ill. No distress.   HENT:   Head: Normocephalic and atraumatic.   Mouth/Throat: No oropharyngeal exudate.   Eyes: Conjunctivae and EOM are normal. Pupils are equal, round, and reactive to light. Right eye exhibits no discharge. Left eye exhibits no discharge. No scleral icterus.   Neck: Normal range of motion. Neck supple. No JVD present. No tracheal deviation present. No thyromegaly present.   Cardiovascular: Normal rate, regular rhythm and intact distal pulses.  Exam reveals no gallop and no friction rub.    Murmur heard.   Systolic murmur is present with a grade of 2/6   Left groin AV fistula with good thrill and bruit.  Left brachial AV fistula no longer in use, though scar present   Pulmonary/Chest: Effort normal. No stridor. No tachypnea and no bradypnea. No respiratory distress. She has no decreased breath sounds. She has no wheezes. She has no rhonchi. She has rales in the right lower field and the left lower field. She exhibits no tenderness.   Abdominal: Soft. Bowel sounds are normal. She exhibits no distension and no mass. There is no tenderness. There is no rebound and no guarding.   Old, well healed midline scar from MATTHIEU and adrenal removal   Genitourinary:   Genitourinary Comments: No sears in place   Musculoskeletal: Normal range of motion. She exhibits no edema or tenderness.   Lymphadenopathy:     She has no cervical adenopathy.   No peripheral edema   Neurological: She is alert and oriented to person, place, and time. She has normal reflexes. She displays no tremor and normal reflexes. No cranial nerve deficit or sensory deficit. She exhibits normal muscle tone. Coordination normal. GCS eye subscore is 4. GCS verbal subscore is 5. GCS motor subscore is 6.   Skin: Skin is warm and dry. No rash noted. She is not diaphoretic. No erythema. No pallor.   Dry and flaking to legs   Psychiatric: Judgment and thought content normal. Her  affect is angry and blunt. She is agitated. She is not actively hallucinating. Cognition and memory are normal. She is attentive.   Nursing note and vitals reviewed.      Significant Labs:   Recent Results (from the past 24 hour(s))   CBC auto differential    Collection Time: 08/26/17  3:17 PM   Result Value Ref Range    WBC 8.12 3.90 - 12.70 K/uL    RBC 3.72 (L) 4.00 - 5.40 M/uL    Hemoglobin 11.4 (L) 12.0 - 16.0 g/dL    Hematocrit 36.1 (L) 37.0 - 48.5 %    MCV 97 82 - 98 fL    MCH 30.6 27.0 - 31.0 pg    MCHC 31.6 (L) 32.0 - 36.0 g/dL    RDW 18.3 (H) 11.5 - 14.5 %    Platelets 297 150 - 350 K/uL    MPV 12.8 9.2 - 12.9 fL    Gran # 6.1 1.8 - 7.7 K/uL    Lymph # 1.2 1.0 - 4.8 K/uL    Mono # 0.5 0.3 - 1.0 K/uL    Eos # 0.2 0.0 - 0.5 K/uL    Baso # 0.07 0.00 - 0.20 K/uL    Gran% 75.3 (H) 38.0 - 73.0 %    Lymph% 14.8 (L) 18.0 - 48.0 %    Mono% 6.4 4.0 - 15.0 %    Eosinophil% 2.5 0.0 - 8.0 %    Basophil% 0.9 0.0 - 1.9 %    Differential Method Automated    Comprehensive metabolic panel    Collection Time: 08/26/17  3:17 PM   Result Value Ref Range    Sodium 141 136 - 145 mmol/L    Potassium 3.5 3.5 - 5.1 mmol/L    Chloride 99 95 - 110 mmol/L    CO2 29 23 - 29 mmol/L    Glucose 120 (H) 70 - 110 mg/dL    BUN, Bld 13 8 - 23 mg/dL    Creatinine 2.4 (H) 0.5 - 1.4 mg/dL    Calcium 8.6 (L) 8.7 - 10.5 mg/dL    Total Protein 8.0 6.0 - 8.4 g/dL    Albumin 3.6 3.5 - 5.2 g/dL    Total Bilirubin 0.7 0.1 - 1.0 mg/dL    Alkaline Phosphatase 132 55 - 135 U/L    AST 24 10 - 40 U/L    ALT 49 (H) 10 - 44 U/L    Anion Gap 13 8 - 16 mmol/L    eGFR if African American 22.3 (A) >60 mL/min/1.73 m^2    eGFR if non  19.3 (A) >60 mL/min/1.73 m^2   Troponin I #1    Collection Time: 08/26/17  3:17 PM   Result Value Ref Range    Troponin I 0.045 (H) 0.000 - 0.026 ng/mL   B-Type natriuretic peptide (BNP)    Collection Time: 08/26/17  3:17 PM   Result Value Ref Range    BNP >4,900 (H) 0 - 99 pg/mL   Magnesium    Collection Time: 08/26/17   3:17 PM   Result Value Ref Range    Magnesium 1.8 1.6 - 2.6 mg/dL   Phosphorus    Collection Time: 08/26/17  3:17 PM   Result Value Ref Range    Phosphorus 2.3 (L) 2.7 - 4.5 mg/dL   Troponin I #2    Collection Time: 08/26/17  6:22 PM   Result Value Ref Range    Troponin I 0.035 (H) 0.000 - 0.026 ng/mL         Significant Imaging:   X-Ray Chest 1 View 08/26/2017 None Specified          RESULTS:  Comparison: 12/13/2016     Results: Single view. Right sided central venous catheter has been removed. Cardiac silhouette is unchanged. Atherosclerotic calcification in the wall of the aortic arch. Pulmonary vascularity is prominent but stable. Continued mild blunting of both costophrenic angles with a little increased opacity at both lung bases likely representing mild volume loss/consolidation. No pneumothorax.  IMPRESSION:    No significant interval change        Electronically signed by:           Dr. DUSTIN DELACRUZ MD  Date:                                                                                    08/26/17  Time:                                                                                   16:15           Signed By:  Dustin Delacruz MD on 8/26/2017  4:15 PM              Assessment/Plan:     Acute respiratory failure with hypoxia  -She does not appear volume overloaded on exam, and she reports she was told she was at dry weight today (no fluid removed)  -?Developing DCHF, ?pulmonary HTN ?Cardiac ischemia ?ILDz ?Volume overload as not really at dry weight ?PTE ?Atelectasis ?Microaspiration  -This has been developing gradually over the course of 1-2 weeks with no tachycardia or pleurisy, so PTE seems less likely  -Check non-contrast CT chest and 2D ECHO with CFD  -She has never smoked  -O2 to keep sats>92%  -Check ABG on room air    ESRD on hemodialysis  Acute on chronic Diastolic heart failure  -It has been over one year since her last Echo  -Repeat 2D ECHO with CFD  -Check PAP to rule out secondary pulmonary  HTN    Malignant renovascular hypertension  -Currently uncontrolled at SBP of 180-200 mmHg  -Her Clonidine patch is in place to left shoulder, due to changes Wednesday  -Clonidine 0.1mg po x 1 now  -Norvasc 10mg po x 1 now  -Continue Norvasc 10mg po qday, Clonidine patch 0.3mg qWed, Losartan 100mg po qday  -Suspect mildly elevated trop is from strain from severe HTN: is coming down    Acute on chronic diastolic heart failure  -She does have a heart murmur   -NPA is very elevated (though ESRD on HD)  -Repeat Echo and aggressive BP control  -Cont BB, ARB    Diabetes mellitus 2, uncontrolled, neuropathy  -2000 Fredi ADA diet  -SSI protocol    Anemia in ESRD  -Stable to monitor as an outpatient      VTE Risk Mitigation         Ordered     heparin (porcine) injection 5,000 Units  Every 8 hours     Route:  Subcutaneous        08/26/17 2202     Medium Risk of VTE  Once      08/26/17 2202     Place sequential compression device  Until discontinued      08/26/17 2202     Place TODD hose  Until discontinued      08/26/17 2202            CHARLI Jung MD  Department of Hospital Medicine   [facility]

## 2017-08-27 NOTE — PROGRESS NOTES
Patient on 3lpm nc being transferred to bed 347. RA ABG order noted RT Daniela called for follow up with ABG upon  arrival to floor.

## 2017-08-27 NOTE — NURSING
Pt arrived on unit via stretcher with oxygen, admitted from ED. Pt in no evident distress. Placed pt on tele. VSS. Pt on 3L NC. Pt complains of only SOB on exertion. Plan of care initiated. CSU orders implemented. Pt has no family at bedside. Bed locked in lowest position, siderails up x2. Yellow nonskid socks placed on patient, fall risk wristband and fall risk reviewed with patient. Allergy wristband placed on patient. Call bell in reach. Will continue to monitor pt.

## 2017-08-27 NOTE — PROGRESS NOTES
BP continues to be elevated and pt remains SB-SR 50s-60s on telemetry.  Pt denies any c/o H/A, dizziness, lightheadedness, or discomfort.  Dr. Ernandez notified; instructed to administer Norvasc and Losartan as scheduled, but hold Coreg.  Orders implemented as instructed. Will continue to monitor.        08/27/17 0745 08/27/17 0746   Vital Signs   Temp 96.9 °F (36.1 °C) --    Temp src Axillary --    Pulse (!) 56 --    Heart Rate Source Monitor --    Resp 18 --    SpO2 (!) 90 % --    Flow (L/min) 3 --    O2 Device (Oxygen Therapy) nasal cannula --    BP (!) 181/77 (!) 178/50   MAP (mmHg) 111 --    BP Location Right arm Right arm   BP Method Automatic Manual   Patient Position Lying Lying

## 2017-08-27 NOTE — SUBJECTIVE & OBJECTIVE
"Past Medical History:   Diagnosis Date    Anemia of chronic renal failure, stage 5     Chronic kidney disease     Diabetes mellitus type II     Diastolic heart failure     ESRD on dialysis since 6/10/16     Essential hypertension     Hyperlipidemia     Hypertension     LVH (left ventricular hypertrophy)     Secondary hyperparathyroidism of renal origin        Past Surgical History:   Procedure Laterality Date    ADRENAL GLAND SURGERY  1990s    removed for tumor which she states was benign     HYSTERECTOMY  1990s    removed for tumor which she states was benign     permcath placement         Review of patient's allergies indicates:   Allergen Reactions    Hydralazine Other (See Comments)     Pt. states "tiredness."      Ramipril      Other reaction(s): deathly ill  Other reaction(s): deathly ill    Simvastatin      Other reaction(s): deathly ill  Other reaction(s): deathly ill     Current Facility-Administered Medications   Medication Frequency    acetaminophen tablet 325 mg Q6H PRN    albuterol-ipratropium 2.5mg-0.5mg/3mL nebulizer solution 3 mL Q4H PRN    amlodipine tablet 10 mg Daily    aspirin chewable tablet 81 mg Daily    calcitRIOL capsule 0.25 mcg Daily    carvedilol tablet 12.5 mg BID    cloNIDine 0.3 mg/24 hr td ptwk 1 patch Weekly    dextrose 50% injection 12.5 g PRN    dextrose 50% injection 25 g PRN    ergocalciferol capsule 50,000 Units Q7 Days    furosemide tablet 120 mg Daily    glucagon (human recombinant) injection 1 mg PRN    glucose chewable tablet 16 g PRN    glucose chewable tablet 24 g PRN    heparin (porcine) injection 5,000 Units Q8H    losartan tablet 100 mg Daily    ondansetron disintegrating tablet 8 mg Q8H PRN    oxycodone immediate release tablet 10 mg Q4H PRN    oxycodone immediate release tablet 5 mg Q4H PRN    polyethylene glycol packet 17 g BID PRN    promethazine tablet 25 mg Q6H PRN    ramelteon tablet 8 mg Nightly PRN    senna-docusate 8.6-50 " mg per tablet 1 tablet BID    sevelamer carbonate tablet 1,600 mg TID WM     Family History     Problem Relation (Age of Onset)    Diabetes Paternal Aunt    Pneumonia Brother        Social History Main Topics    Smoking status: Never Smoker    Smokeless tobacco: Never Used    Alcohol use No      Comment: quit since she started dialysis; previously was only having wine occasionally and in her younger years would have whiskey/soda, etc    Drug use: No    Sexual activity: Not on file     Review of Systems   HENT: Positive for congestion.    Respiratory: Positive for cough. Negative for shortness of breath.    All other systems reviewed and are negative.    Objective:     Vital Signs (Most Recent):  Temp: 98.1 °F (36.7 °C) (08/27/17 1105)  Pulse: (!) 58 (08/27/17 1105)  Resp: 18 (08/27/17 1105)  BP: (!) 164/50 (08/27/17 1106)  SpO2: (!) 93 % (08/27/17 1105)  O2 Device (Oxygen Therapy): nasal cannula w/ humidification (08/27/17 1105) Vital Signs (24h Range):  Temp:  [96.9 °F (36.1 °C)-98.7 °F (37.1 °C)] 98.1 °F (36.7 °C)  Pulse:  [53-84] 58  Resp:  [18-20] 18  SpO2:  [77 %-98 %] 93 %  BP: (164-211)/(50-97) 164/50     Weight: 63.5 kg (139 lb 15.9 oz) (08/26/17 2322)  Body mass index is 27.34 kg/m².  Body surface area is 1.64 meters squared.    No intake/output data recorded.    Physical Exam   Constitutional: She is oriented to person, place, and time. She appears well-developed and well-nourished. No distress.   HENT:   Head: Normocephalic and atraumatic.   Eyes: Conjunctivae and EOM are normal.   Neck: Normal range of motion. Neck supple. No tracheal deviation present. No thyromegaly present.   Cardiovascular: Normal rate and regular rhythm.    Pulmonary/Chest: Effort normal. No respiratory distress. She has wheezes (occasional wheeze). She has rales.   Abdominal: Soft. She exhibits no distension.   Musculoskeletal: She exhibits no edema or deformity.   Neurological: She is alert and oriented to person, place, and  time.   Skin: Skin is warm and dry. She is not diaphoretic.   Psychiatric: Her affect is angry.       Significant Labs:  CBC:   Recent Labs  Lab 08/27/17  0503   WBC 6.96   RBC 3.29*   HGB 10.2*   HCT 32.3*      MCV 98   MCH 31.0   MCHC 31.6*     CMP:   Recent Labs  Lab 08/26/17  1517 08/27/17  0503   * 150*   CALCIUM 8.6* 8.7   ALBUMIN 3.6  --    PROT 8.0  --     142   K 3.5 4.2   CO2 29 30*   CL 99 98   BUN 13 28*   CREATININE 2.4* 4.3*   ALKPHOS 132  --    ALT 49*  --    AST 24  --    BILITOT 0.7  --      All labs within the past 24 hours have been reviewed.    Significant Imaging:  Labs: Reviewed  X-Ray: Reviewed

## 2017-08-27 NOTE — CLINICAL REVIEW
Given below issues, will consult Pulmonary.    CT Chest Without Contrast 08/26/2017 progressive shortness of breath and hypoxia          RESULTS:  Time of Procedure: 08/26/17 22:20:22  Accession # [id number]     Technique: The chest was surveyed from the lung apices through the costophrenic angles without the use of intravenous contrast material.  Data was reformatted for contiguous 5 mm images in the axial, sagittal, and coronal planes.  Data was post processed for extraction of 1 mm images for effective high-resolution CT imaging without additional radiation to the patient.     Comparison: Chest radiograph 8/26/2017.        Findings:     The structures at the base of the neck reveal no convincing evidence of disease.     There is a left-sided aortic arch with three branch vessels.  The thoracic aorta maintains normal caliber, contour, and course with significant atherosclerotic calcification within its course.       The heart is enlarged and there is no evidence of pericardial effusion.  There is significant radiopaque material within the mitral and aortic valves, which may correlate with extensive calcification versus prosthesis.  There is extensive calcification of the coronary arteries.     The pulmonary arteries distribute normally.  There are four pulmonary veins.  Systemic and pulmonary venoatrial connections are concordant.     The trachea and proximal airways are patent.     The lungs demonstrate bilateral pleural fluid, greatest on the right with associated atelectatic change.  Additional bandlike opacities are present bilaterally, most prominent within the right lower lobe.  Multiple bilateral pulmonary nodules are identified, the largest on the right measuring 0.7 x 0.5 cm within the apical segment of the right upper lobe (axial series 2, image 32).  The largest on the left measures 0.5 x 0.4 cm within the anterior segment of the left upper lobe (axial series 2, image 37).  No pneumothorax or significant  pleural thickening.     There is no axillary or mediastinal lymph node enlargement.  The hilar contours are unremarkable on this non-contrast examination.     The esophagus maintains a normal course and caliber.     Limited views of the abdomen demonstrate significant renal atrophy with left renal hypodensities which are too small to characterize.  Surgical material is seen within the region of the left adrenal gland in this patient with a history of prior adrenal surgery.  Nodular hypodensity of the right adrenal gland measuring at least 1.7 cm.  Although incompletely characterized, the density measures approximately 0 HU (series 2, image 119), consistent with an adenoma.      The osseous structures demonstrate degenerative change of the spine.  IMPRESSION:         1.  Moderate sized bilateral pleural effusions, right greater than left with associated atelectatic change and bandlike opacities, most prominent within the right lower lobe.     2.  Multiple bilateral pulmonary nodules, the largest of which measures 0.7 x 0.5 cm within the apical segment of the right upper lobe (axial series 2, image 32). Per Fleischner Society 2017 guidelines, in a low or high risk patient: CT chest 3-6 months with followup CT chest in 18-24 months.     3.  Extensive coronary and aortic atherosclerosis.     4. Low-density nodular thickening of the right adrenal gland, favored to represent an adenoma.     5.  Additional findings include calcifications of the mitral and aortic valve, postsurgical changes in the region of the left adrenal gland, degenerative change of the spine, renal atrophy and right renal hypodensities which are too small to characterize.        This report has been flagged in the Epic medical record system.  ______________________________________      Electronically signed by resident: KILEY LEMOS MD  Date:                                                                                    08/26/17  Time:                                                                                    23:13                 As the supervising and teaching physician, I personally reviewed the images and resident's interpretation and I agree with the findings.              Electronically signed by:           Richie Mata  Date:                                                                                    08/26/17  Time:                                                                                   23:27          Attestation:  As the supervising and teaching physician, I personally reviewed the images and resident interpretation and I agree with the findings.          Signed By:  Richie Mata MD on 8/26/2017 11:27 PM

## 2017-08-27 NOTE — ASSESSMENT & PLAN NOTE
- receives iHD TTS via L femoral AVG. Treatment duration 4 hours. EDW: 61kg.   - last HD on Saturday; only 500cc UF removed as patient was below her EDW  - here with hypoxia and large pleural effusions which are thought to be related to inadequate volume removal with dialysis  - EDW recently increased at outpatient unit from 60.5 to 61kg given significant cramping with each treatment  - will plan for HD tomorrow x 4 hours. Will attempt to remove 2-3L as tolerated. If unable to tolerate appropriate volume removal, will need to consider other options.

## 2017-08-27 NOTE — PROGRESS NOTES
Maintenance hemodialysis treatment started to pt left thigh graft using two 15 G needles. Pt tolerated well.

## 2017-08-27 NOTE — PLAN OF CARE
Problem: Patient Care Overview  Goal: Plan of Care Review  Outcome: Ongoing (interventions implemented as appropriate)  Pt remained stable throughout the night. No acute distress noted. Pt remained free from injury. VSS. Pt denies any chest pain or SOB. HD T, Th, S. L thigh graft. 2L nc. Pt understands plan of care. Will continue to monitor.

## 2017-08-27 NOTE — ASSESSMENT & PLAN NOTE
- receives iHD TTS via L femoral AVG. Treatment duration 4 hours. EDW: 61kg.   - last HD on Saturday; only 500cc UF removed as patient was below her EDW  - here with hypoxia and large pleural effusions which are thought to be related to inadequate volume removal with dialysis  - EDW recently increased at outpatient unit from 60.5 to 61kg given significant cramping with each treatment  - will plan for HD with UF only today; 2-3L over 3 hours.

## 2017-08-27 NOTE — CONSULTS
"HPI: Ms. Salcedo is a 73yo lady with a past medical history of ESRD on HD since 6/10/16, dialyzed T, Th and Saturday.  She also has HTN, DCHF and DM2.  She is dialyzed at [facility] on [facility] and underwent her full 4 hours today, though no fluid was removed as she was at her dry weight (she does not know what that is).  After HD she took her oxygen monitor and checked her sats in the car and it was 74%, so she came straight to the ED.  She states that over the past week it has been running in the low 80's.  She states that she has been experiencing "shortness of breath, so I was taking it more often."    Per chart review, patient was scheduled to undergot PD catheter placement but upon pre-op evaluation patient claimed she was SOB, SpO2 88% which patient claimed had been happening for about 6 mos, but had not complained to her cardiologist. HD 8/24 was cut short 30 min due to hand cramping which HD attributes to "too much fluid being removed."     Upon presentation to Ed, patient SBPs in 220, patient given home amylodipine and labetalol; cardene drip started; CXR with bilateral pulmonary edema and R. Pleural effusion. BNP ~ 4,900. Patient O2 saturation initially 84%.    Upon ICU attempt for evaluation, patient was on 3L NC saturating in the 90s. Patient claimed she felt fine. Refuse ICU history/ROS/PE. Claimed she did not want or need ICU care. Patient was off cardene drip after refusing it SBPs in 170s.    Spoke with ED physician.  Recommend continue all of home meds  Hydralazine as needed  Patient refused ICU attention.    Thank you for your consult  Magui José MD     "

## 2017-08-27 NOTE — PLAN OF CARE
Problem: Patient Care Overview  Goal: Plan of Care Review  Outcome: Revised  Pt free of falls/traumas/injuries. Denies c/o SOB, CP, or discomfort. Generalized skin remains CDI; trace edema noted to BLEs.  BP managed with PO antihypertensives.  Blood glucose diet controlled; no supplemental insulin required.  Plan for 2D echo and to continue with HD TThSat schedule. Pt tolerating plan of care.

## 2017-08-27 NOTE — HPI
Ms. Salcedo is a 71 yo AAF with HTN, T2DM, CHF, and ESRD who presented to the ED yesterday with SpO2 76% after receiving her dialysis treatment. Patient has a well-known history of non-compliance with diet/lifestyle modifications (i.e., not following a fluid restriction despite repeated instruction) and non-compliance with allowing effective dialysis (i.e., not allowing UF goals to be met 2/2 complaints with dialysis techs and frequent cramping). Patient was started on HD 1/2017. Her dry weight in lbs was previously in the 120s. Her weight now is in the 130s. She reports her EDW was increased on dialysis due to inability to reach her dry weight 2/2 severe cramping and malaise preventing further volume removal. She initially tolerated her increased dry weight value well until the last month. She now cramps with each dialysis treatment. On Saturday she was at her dry weight and thus no fluid was removed. She had no cramping. She has reported increasing SOB over the last several weeks and when she saw her low SpO2, she decided to come to the OR. Imaging was concerning for large bilateral pleural effusions. She was evaluated by pulmonology who felt this to be renal in nature and requested more UF with each HD treatment. She currently denies any SOB but continues to have O2 requirement. She denies ever having LE edema.   Ms. Salcedo receives iHD TTS via L femoral AVG at [facility]. Nephrologist is Dr. Abarca. Treatment duration: 4 hours. Current EDW: 61kg. Per review of patient's outside dialysis care plans; she normally comes in 4-5kg above her dry weight. On Saturday, she came in below her dry weight. As a result only 500cc fluid was removed.   Consent for dialysis obtained by patient and placed in chart.

## 2017-08-27 NOTE — CONSULTS
[facility]  Nephrology  Consult Note    Patient Name: Beth Salcedo  MRN: [id number]  Admission Date: 8/26/2017  Hospital Length of Stay: 1 days  Attending Provider: Alfred Ernandez MD   Primary Care Physician: Emili Sanchez MD  Principal Problem:Acute hypoxemic respiratory failure    Inpatient consult to Nephrology  Consult performed by: PRADEEP RYAN  Consult ordered by: PAM ANDERSON  Reason for consult: ESRD        Subjective:     HPI: Ms. Salcedo is a 73 yo AAF with HTN, T2DM, CHF, and ESRD who presented to the ED yesterday with SpO2 76% after receiving her dialysis treatment. Patient has a well-known history of non-compliance with diet/lifestyle modifications (i.e., not following a fluid restriction despite repeated instruction) and non-compliance with allowing effective dialysis (i.e., not allowing UF goals to be met 2/2 complaints with dialysis techs and frequent cramping). Patient was started on HD 1/2017. Her dry weight in lbs was previously in the 120s. Her weight now is in the 130s. She reports her EDW was increased on dialysis due to inability to reach her dry weight 2/2 severe cramping and malaise preventing further volume removal. She initially tolerated her increased dry weight value well until the last month. She now cramps with each dialysis treatment. On Saturday she was at her dry weight and thus no fluid was removed. She had no cramping. She has reported increasing SOB over the last several weeks and when she saw her low SpO2, she decided to come to the OR. Imaging was concerning for large bilateral pleural effusions. She was evaluated by pulmonology who felt this to be renal in nature and requested more UF with each HD treatment. She currently denies any SOB but continues to have O2 requirement. She denies ever having LE edema.   Ms. Salcedo receives iHD TTS via L femoral AVG at [facility]. Nephrologist is Dr. Abarca. Treatment  "duration: 4 hours. Current EDW: 61kg. Per review of patient's outside dialysis care plans; she normally comes in 4-5kg above her dry weight. On Saturday, she came in below her dry weight. As a result only 500cc fluid was removed.   Consent for dialysis obtained by patient and placed in chart.     Past Medical History:   Diagnosis Date    Anemia of chronic renal failure, stage 5     Chronic kidney disease     Diabetes mellitus type II     Diastolic heart failure     ESRD on dialysis since 6/10/16     Essential hypertension     Hyperlipidemia     Hypertension     LVH (left ventricular hypertrophy)     Secondary hyperparathyroidism of renal origin        Past Surgical History:   Procedure Laterality Date    ADRENAL GLAND SURGERY  1990s    removed for tumor which she states was benign     HYSTERECTOMY  1990s    removed for tumor which she states was benign     permcath placement         Review of patient's allergies indicates:   Allergen Reactions    Hydralazine Other (See Comments)     Pt. states "tiredness."      Ramipril      Other reaction(s): deathly ill  Other reaction(s): deathly ill    Simvastatin      Other reaction(s): deathly ill  Other reaction(s): deathly ill     Current Facility-Administered Medications   Medication Frequency    acetaminophen tablet 325 mg Q6H PRN    albuterol-ipratropium 2.5mg-0.5mg/3mL nebulizer solution 3 mL Q4H PRN    amlodipine tablet 10 mg Daily    aspirin chewable tablet 81 mg Daily    calcitRIOL capsule 0.25 mcg Daily    carvedilol tablet 12.5 mg BID    cloNIDine 0.3 mg/24 hr td ptwk 1 patch Weekly    dextrose 50% injection 12.5 g PRN    dextrose 50% injection 25 g PRN    ergocalciferol capsule 50,000 Units Q7 Days    furosemide tablet 120 mg Daily    glucagon (human recombinant) injection 1 mg PRN    glucose chewable tablet 16 g PRN    glucose chewable tablet 24 g PRN    heparin (porcine) injection 5,000 Units Q8H    losartan tablet 100 mg Daily    " ondansetron disintegrating tablet 8 mg Q8H PRN    oxycodone immediate release tablet 10 mg Q4H PRN    oxycodone immediate release tablet 5 mg Q4H PRN    polyethylene glycol packet 17 g BID PRN    promethazine tablet 25 mg Q6H PRN    ramelteon tablet 8 mg Nightly PRN    senna-docusate 8.6-50 mg per tablet 1 tablet BID    sevelamer carbonate tablet 1,600 mg TID WM     Family History     Problem Relation (Age of Onset)    Diabetes Paternal Aunt    Pneumonia Brother        Social History Main Topics    Smoking status: Never Smoker    Smokeless tobacco: Never Used    Alcohol use No      Comment: quit since she started dialysis; previously was only having wine occasionally and in her younger years would have whiskey/soda, etc    Drug use: No    Sexual activity: Not on file     Review of Systems   HENT: Positive for congestion.    Respiratory: Positive for cough. Negative for shortness of breath.    All other systems reviewed and are negative.    Objective:     Vital Signs (Most Recent):  Temp: 98.1 °F (36.7 °C) (08/27/17 1105)  Pulse: (!) 58 (08/27/17 1105)  Resp: 18 (08/27/17 1105)  BP: (!) 164/50 (08/27/17 1106)  SpO2: (!) 93 % (08/27/17 1105)  O2 Device (Oxygen Therapy): nasal cannula w/ humidification (08/27/17 1105) Vital Signs (24h Range):  Temp:  [96.9 °F (36.1 °C)-98.7 °F (37.1 °C)] 98.1 °F (36.7 °C)  Pulse:  [53-84] 58  Resp:  [18-20] 18  SpO2:  [77 %-98 %] 93 %  BP: (164-211)/(50-97) 164/50     Weight: 63.5 kg (139 lb 15.9 oz) (08/26/17 2322)  Body mass index is 27.34 kg/m².  Body surface area is 1.64 meters squared.    No intake/output data recorded.    Physical Exam   Constitutional: She is oriented to person, place, and time. She appears well-developed and well-nourished. No distress.   HENT:   Head: Normocephalic and atraumatic.   Eyes: Conjunctivae and EOM are normal.   Neck: Normal range of motion. Neck supple. No tracheal deviation present. No thyromegaly present.   Cardiovascular: Normal rate  and regular rhythm.    Pulmonary/Chest: Effort normal. No respiratory distress. She has wheezes (occasional wheeze). She has rales.   Abdominal: Soft. She exhibits no distension.   Musculoskeletal: She exhibits no edema or deformity.   Neurological: She is alert and oriented to person, place, and time.   Skin: Skin is warm and dry. She is not diaphoretic.   Psychiatric: Her affect is angry.       Significant Labs:  CBC:   Recent Labs  Lab 08/27/17  0503   WBC 6.96   RBC 3.29*   HGB 10.2*   HCT 32.3*      MCV 98   MCH 31.0   MCHC 31.6*     CMP:   Recent Labs  Lab 08/26/17  1517 08/27/17  0503   * 150*   CALCIUM 8.6* 8.7   ALBUMIN 3.6  --    PROT 8.0  --     142   K 3.5 4.2   CO2 29 30*   CL 99 98   BUN 13 28*   CREATININE 2.4* 4.3*   ALKPHOS 132  --    ALT 49*  --    AST 24  --    BILITOT 0.7  --      All labs within the past 24 hours have been reviewed.    Significant Imaging:  Labs: Reviewed  X-Ray: Reviewed    Assessment/Plan:     ESRD (end stage renal disease) on dialysis    - receives iHD TTS via L femoral AVG. Treatment duration 4 hours. EDW: 61kg.   - last HD on Saturday; only 500cc UF removed as patient was below her EDW  - here with hypoxia and large pleural effusions which are thought to be related to inadequate volume removal with dialysis  - EDW recently increased at outpatient unit from 60.5 to 61kg given significant cramping with each treatment  - will plan for HD with UF only today; 2-3L over 3 hours.         Anemia of chronic renal failure, stage 5    - receives Micera q 2 weeks with dialysis        Secondary hyperparathyroidism of renal origin    - receives Hectoral 5mcg IV with each HD treatment  - will continue equivalent while admitted            Magalis Sam, PGY-5  Nephrology Fellow  [facility]  Pager: [phone]

## 2017-08-27 NOTE — PROGRESS NOTES
BP continues to be elevated.  Pt still denying any c/o dizziness, lightheadedness, H/A, or discomfort.  No scheduled or PRN BP meds available at this time.  Dr. Ernandez notified; no new orders at this time. Will continue to monitor.        08/27/17 1105 08/27/17 1106   Vital Signs   Temp 98.1 °F (36.7 °C) --    Temp src Oral --    Pulse (!) 58 --    Heart Rate Source Monitor --    Resp 18 --    SpO2 (!) 93 % --    Flow (L/min) 3 --    O2 Device (Oxygen Therapy) nasal cannula w/ humidification --    BP (!) 179/74 (!) 164/50   MAP (mmHg) 107 --    BP Location Right arm Right arm   BP Method Automatic Manual   Patient Position Sitting Sitting

## 2017-08-28 PROBLEM — I27.20 PULMONARY HYPERTENSION: Status: ACTIVE | Noted: 2017-08-28

## 2017-08-28 LAB
ANION GAP SERPL CALC-SCNC: 16 MMOL/L
BASOPHILS # BLD AUTO: 0.07 K/UL
BASOPHILS NFR BLD: 1 %
BUN SERPL-MCNC: 48 MG/DL
CALCIUM SERPL-MCNC: 8.5 MG/DL
CHLORIDE SERPL-SCNC: 95 MMOL/L
CO2 SERPL-SCNC: 26 MMOL/L
CREAT SERPL-MCNC: 6.2 MG/DL
DIFFERENTIAL METHOD: ABNORMAL
EOSINOPHIL # BLD AUTO: 0.4 K/UL
EOSINOPHIL NFR BLD: 5.2 %
ERYTHROCYTE [DISTWIDTH] IN BLOOD BY AUTOMATED COUNT: 18.2 %
EST. GFR  (AFRICAN AMERICAN): 7.1 ML/MIN/1.73 M^2
EST. GFR  (NON AFRICAN AMERICAN): 6.1 ML/MIN/1.73 M^2
ESTIMATED PA SYSTOLIC PRESSURE: 80.44
GLOBAL PERICARDIAL EFFUSION: ABNORMAL
GLUCOSE SERPL-MCNC: 121 MG/DL
HCT VFR BLD AUTO: 31.6 %
HGB BLD-MCNC: 10.1 G/DL
LYMPHOCYTES # BLD AUTO: 1.1 K/UL
LYMPHOCYTES NFR BLD: 14.8 %
MAGNESIUM SERPL-MCNC: 2.1 MG/DL
MCH RBC QN AUTO: 30.9 PG
MCHC RBC AUTO-ENTMCNC: 32 G/DL
MCV RBC AUTO: 97 FL
MITRAL VALVE MOBILITY: NORMAL
MONOCYTES # BLD AUTO: 0.8 K/UL
MONOCYTES NFR BLD: 11 %
NEUTROPHILS # BLD AUTO: 4.8 K/UL
NEUTROPHILS NFR BLD: 67.7 %
PHOSPHATE SERPL-MCNC: 5.3 MG/DL
PLATELET # BLD AUTO: 262 K/UL
PMV BLD AUTO: 11.8 FL
POTASSIUM SERPL-SCNC: 4.6 MMOL/L
RBC # BLD AUTO: 3.27 M/UL
RETIRED EF AND QEF - SEE NOTES: 60 (ref 55–65)
SODIUM SERPL-SCNC: 137 MMOL/L
TRICUSPID VALVE REGURGITATION: ABNORMAL
WBC # BLD AUTO: 7.15 K/UL

## 2017-08-28 PROCEDURE — 25000003 PHARM REV CODE 250: Performed by: HOSPITALIST

## 2017-08-28 PROCEDURE — 20600001 HC STEP DOWN PRIVATE ROOM

## 2017-08-28 PROCEDURE — 63600175 PHARM REV CODE 636 W HCPCS: Performed by: INTERNAL MEDICINE

## 2017-08-28 PROCEDURE — 85025 COMPLETE CBC W/AUTO DIFF WBC: CPT

## 2017-08-28 PROCEDURE — 25000003 PHARM REV CODE 250: Performed by: PHYSICIAN ASSISTANT

## 2017-08-28 PROCEDURE — 84100 ASSAY OF PHOSPHORUS: CPT

## 2017-08-28 PROCEDURE — 80048 BASIC METABOLIC PNL TOTAL CA: CPT

## 2017-08-28 PROCEDURE — 36415 COLL VENOUS BLD VENIPUNCTURE: CPT

## 2017-08-28 PROCEDURE — 25000003 PHARM REV CODE 250: Performed by: INTERNAL MEDICINE

## 2017-08-28 PROCEDURE — 83735 ASSAY OF MAGNESIUM: CPT

## 2017-08-28 PROCEDURE — 99233 SBSQ HOSP IP/OBS HIGH 50: CPT | Mod: ,,, | Performed by: HOSPITALIST

## 2017-08-28 RX ORDER — CLONIDINE HYDROCHLORIDE 0.1 MG/1
0.1 TABLET ORAL 3 TIMES DAILY
Status: DISCONTINUED | OUTPATIENT
Start: 2017-08-28 | End: 2017-08-29

## 2017-08-28 RX ORDER — SODIUM CHLORIDE 9 MG/ML
INJECTION, SOLUTION INTRAVENOUS
Status: DISCONTINUED | OUTPATIENT
Start: 2017-08-28 | End: 2017-08-29

## 2017-08-28 RX ORDER — CLONIDINE HYDROCHLORIDE 0.1 MG/1
0.1 TABLET ORAL ONCE
Status: DISCONTINUED | OUTPATIENT
Start: 2017-08-28 | End: 2017-08-28

## 2017-08-28 RX ORDER — SODIUM CHLORIDE 9 MG/ML
INJECTION, SOLUTION INTRAVENOUS ONCE
Status: COMPLETED | OUTPATIENT
Start: 2017-08-28 | End: 2017-08-29

## 2017-08-28 RX ADMIN — AMLODIPINE BESYLATE 10 MG: 10 TABLET ORAL at 09:08

## 2017-08-28 RX ADMIN — HEPARIN SODIUM 5000 UNITS: 5000 INJECTION, SOLUTION INTRAVENOUS; SUBCUTANEOUS at 09:08

## 2017-08-28 RX ADMIN — HEPARIN SODIUM 5000 UNITS: 5000 INJECTION, SOLUTION INTRAVENOUS; SUBCUTANEOUS at 06:08

## 2017-08-28 RX ADMIN — HEPARIN SODIUM 5000 UNITS: 5000 INJECTION, SOLUTION INTRAVENOUS; SUBCUTANEOUS at 02:08

## 2017-08-28 RX ADMIN — SEVELAMER CARBONATE 1600 MG: 800 TABLET, FILM COATED ORAL at 09:08

## 2017-08-28 RX ADMIN — CLONIDINE HYDROCHLORIDE 0.1 MG: 0.1 TABLET ORAL at 09:08

## 2017-08-28 RX ADMIN — LOSARTAN POTASSIUM 100 MG: 50 TABLET, FILM COATED ORAL at 09:08

## 2017-08-28 RX ADMIN — CARVEDILOL 12.5 MG: 12.5 TABLET, FILM COATED ORAL at 05:08

## 2017-08-28 RX ADMIN — SEVELAMER CARBONATE 1600 MG: 800 TABLET, FILM COATED ORAL at 12:08

## 2017-08-28 RX ADMIN — CARVEDILOL 12.5 MG: 12.5 TABLET, FILM COATED ORAL at 09:08

## 2017-08-28 RX ADMIN — STANDARDIZED SENNA CONCENTRATE AND DOCUSATE SODIUM 1 TABLET: 8.6; 5 TABLET, FILM COATED ORAL at 09:08

## 2017-08-28 RX ADMIN — SEVELAMER CARBONATE 1600 MG: 800 TABLET, FILM COATED ORAL at 04:08

## 2017-08-28 RX ADMIN — CALCITRIOL 0.25 MCG: 0.25 CAPSULE, LIQUID FILLED ORAL at 09:08

## 2017-08-28 NOTE — NURSING
Elevated blood pressure, reported to Dr. Ernandez, 181/88, hr 58. Will admin pm coreg now and re check bp at 18:30, if no improvement, call MD. I also reported that pt is not wearing weekly clonidine patch, she stated that someone removed it, it may have come off in the shower this am,. Will continue to monitor.

## 2017-08-28 NOTE — NURSING
"Pt's /64 manual. Pt denies CP, SOB. States "it'll be this high as long as I'm here. I've been having white coat syndrome for 40 years now, nothing will bring my pressure down as long as I'm here." Juan David Peterson NP notified. Stated would look into chart and put orders in. Will follow up. Will continue to monitor.  "

## 2017-08-28 NOTE — PLAN OF CARE
Problem: Patient Care Overview  Goal: Plan of Care Review  Outcome: Ongoing (interventions implemented as appropriate)  Diabetes diet controlled, o2 weaned to room air this afternoon, with pulse ox of 97%, however pt c/o sob, with pulse ox 89%, 02 applied at 2l/nc, will continue to document q4h and prn. No fall related injury, afebrile. Discharge date to be determined.

## 2017-08-28 NOTE — PROGRESS NOTES
"Progress Note  Hospital Medicine    Admit Date: 8/26/2017  Length of Stay:  LOS: 2 days     SUBJECTIVE:         Follow-up For:  Acute hypoxemic respiratory failure    HPI/Interval history (See H&P for complete P,F,SHx) :     08/27/17  CT chest - Moderate sized bilateral pleural effusions, right greater than left with associated atelectatic change and bandlike opacities, most prominent within the right lower lobe. s/p pulmonology evaluation.Prior pleural tap 06/16- transudative. likely from worsenign diastolic CHF, no plan for thoracentesis. on 3L oxygen via nasal canula     08/28/17  S/p HD yesterday and ultrafiltration 0f 2.7L . patient does not want to take hydralazine as it gave her "pounding in the chest" -she discontinued on her own. mentions she has "whitecoat hypertension" . blood pressure controlled this AM with SBP 130s      Review of Systems: List if applicable  Pain scale: 010  Constitutional- Positive for Weakness  Resp- Positive for  SOB      OBJECTIVE:     Vital Signs Range (Last 24H):  Temp:  [97.5 °F (36.4 °C)-98.3 °F (36.8 °C)]   Pulse:  [51-66]   Resp:  [16-18]   BP: (132-196)/(48-83)   SpO2:  [90 %-97 %]     Physical Exam:  General- Patient alert and oriented x3   HEENT- PERRLA, EOMI, OP clear  Neck- No JVD, Lymphadenopathy, Thyromegaly  CV- Regular rate and rhythm, No Murmur/christine/rubs  Resp- Lungs CTA Bilaterally, No increased WOB  Abdomen- Non tender/non-distended, BS normoactive x4 quads, no HSM  Extrem- No cyanosis, clubbing, edema. Left thigh AV graft   Skin- No rashes, lesions, ulcers  Neuro- Strength 5/5 flexors/extensors,Intact sensation to light touch grossly      Medications:  Medication list was reviewed and changes noted under Assessment/Plan.      Current Facility-Administered Medications:     0.9%  NaCl infusion, , Intravenous, PRN, Magalis Ravi MD    acetaminophen tablet 325 mg, 325 mg, Oral, Q6H PRN, PAM Jung MD    albuterol-ipratropium 2.5mg-0.5mg/3mL " nebulizer solution 3 mL, 3 mL, Nebulization, Q4H PRN, PAM Jung MD    amlodipine tablet 10 mg, 10 mg, Oral, Daily, PAM Jung MD, 10 mg at 08/28/17 0909    aspirin chewable tablet 81 mg, 81 mg, Oral, Daily, PAM Jung MD, 81 mg at 08/27/17 0802    calcitRIOL capsule 0.25 mcg, 0.25 mcg, Oral, Daily, PAM Jung MD, 0.25 mcg at 08/28/17 0907    carvedilol tablet 12.5 mg, 12.5 mg, Oral, BID, PAM Jung MD, 12.5 mg at 08/28/17 0909    cloNIDine 0.3 mg/24 hr td ptwk 1 patch, 1 patch, Transdermal, Weekly, PAM Jung MD, Stopped at 08/26/17 2215    dextrose 50% injection 12.5 g, 12.5 g, Intravenous, PRN, PAM Jung MD    dextrose 50% injection 25 g, 25 g, Intravenous, PRN, PAM Jung MD    ergocalciferol capsule 50,000 Units, 50,000 Units, Oral, Q7 Days, PAM Jung MD, 50,000 Units at 08/27/17 0801    glucagon (human recombinant) injection 1 mg, 1 mg, Intramuscular, PRN, PAM Jung MD    glucose chewable tablet 16 g, 16 g, Oral, PRN, PAM Jung MD    glucose chewable tablet 24 g, 24 g, Oral, PRN, PAM Jung MD    heparin (porcine) injection 5,000 Units, 5,000 Units, Subcutaneous, Q8H, PAM Jung MD, 5,000 Units at 08/28/17 0600    losartan tablet 100 mg, 100 mg, Oral, Daily, PAM Jung MD, 100 mg at 08/28/17 0909    ondansetron disintegrating tablet 8 mg, 8 mg, Oral, Q8H PRN, PAM Jung MD    oxycodone immediate release tablet 10 mg, 10 mg, Oral, Q4H PRN, PAM Jung MD    oxycodone immediate release tablet 5 mg, 5 mg, Oral, Q4H PRN, PAM Jung MD    polyethylene glycol packet 17 g, 17 g, Oral, BID PRN, PAM Jugn MD    promethazine tablet 25 mg, 25 mg, Oral, Q6H PRN, PAM Jung MD    ramelteon tablet 8 mg, 8 mg, Oral, Nightly PRN, PAM Jung MD    senna-docusate 8.6-50 mg per tablet 1 tablet, 1 tablet, Oral, BID, PAM Jung MD, 1 tablet at 08/27/17 7488    sevelamer carbonate  tablet 1,600 mg, 1,600 mg, Oral, TID WM, Alfred Ernandez MD, 1,600 mg at 08/28/17 1229    sodium chloride 0.9%, acetaminophen, albuterol-ipratropium 2.5mg-0.5mg/3mL, dextrose 50%, dextrose 50%, glucagon (human recombinant), glucose, glucose, ondansetron, oxycodone, oxycodone, polyethylene glycol, promethazine, ramelteon    Laboratory/Diagnostic Data:  Reviewed and noted in plan where applicable- Please see chart for full lab data.      Recent Labs  Lab 08/26/17  1517 08/27/17  0503 08/28/17  0506   WBC 8.12 6.96 7.15   HGB 11.4* 10.2* 10.1*   HCT 36.1* 32.3* 31.6*    279 262         Recent Labs  Lab 08/26/17  1517 08/27/17  0503 08/28/17  0506    142 137   K 3.5 4.2 4.6   CL 99 98 95   CO2 29 30* 26   BUN 13 28* 48*   CREATININE 2.4* 4.3* 6.2*   * 150* 121*   CALCIUM 8.6* 8.7 8.5*   MG 1.8 1.9 2.1   PHOS 2.3* 4.9* 5.3*         Recent Labs  Lab 08/26/17  1517   ALKPHOS 132   ALT 49*   AST 24   ALBUMIN 3.6   PROT 8.0   BILITOT 0.7        Microbiology labs for the last week  Microbiology Results (last 7 days)     ** No results found for the last 168 hours. **           Imaging Results          CT Chest Without Contrast (Final result)     Abnormal  Result time 08/26/17 23:27:55    Final result by Richie Mata MD (08/26/17 23:27:55)                 Impression:        1.  Moderate sized bilateral pleural effusions, right greater than left with associated atelectatic change and bandlike opacities, most prominent within the right lower lobe.    2.  Multiple bilateral pulmonary nodules, the largest of which measures 0.7 x 0.5 cm within the apical segment of the right upper lobe (axial series 2, image 32). Per Fleischner Society 2017 guidelines, in a low or high risk patient: CT chest 3-6 months with followup CT chest in 18-24 months.    3.  Extensive coronary and aortic atherosclerosis.    4. Low-density nodular thickening of the right adrenal gland, favored to represent an adenoma.    5.   Additional findings include calcifications of the mitral and aortic valve, postsurgical changes in the region of the left adrenal gland, degenerative change of the spine, renal atrophy and right renal hypodensities which are too small to characterize.      This report has been flagged in the Epic medical record system.  ______________________________________     Electronically signed by resident: KILEY LEMOS MD  Date:     08/26/17  Time:    23:13            As the supervising and teaching physician, I personally reviewed the images and resident's interpretation and I agree with the findings.          Electronically signed by: Richie Mata  Date:     08/26/17  Time:    23:27              Narrative:    Time of Procedure: 08/26/17 22:20:22  Accession # [id number]    Technique: The chest was surveyed from the lung apices through the costophrenic angles without the use of intravenous contrast material.  Data was reformatted for contiguous 5 mm images in the axial, sagittal, and coronal planes.  Data was post processed for extraction of 1 mm images for effective high-resolution CT imaging without additional radiation to the patient.    Comparison: Chest radiograph 8/26/2017.      Findings:    The structures at the base of the neck reveal no convincing evidence of disease.    There is a left-sided aortic arch with three branch vessels.  The thoracic aorta maintains normal caliber, contour, and course with significant atherosclerotic calcification within its course.      The heart is enlarged and there is no evidence of pericardial effusion.  There is significant radiopaque material within the mitral and aortic valves, which may correlate with extensive calcification versus prosthesis.  There is extensive calcification of the coronary arteries.    The pulmonary arteries distribute normally.  There are four pulmonary veins.  Systemic and pulmonary venoatrial connections are concordant.    The trachea and proximal  airways are patent.    The lungs demonstrate bilateral pleural fluid, greatest on the right with associated atelectatic change.  Additional bandlike opacities are present bilaterally, most prominent within the right lower lobe.  Multiple bilateral pulmonary nodules are identified, the largest on the right measuring 0.7 x 0.5 cm within the apical segment of the right upper lobe (axial series 2, image 32).  The largest on the left measures 0.5 x 0.4 cm within the anterior segment of the left upper lobe (axial series 2, image 37).  No pneumothorax or significant pleural thickening.    There is no axillary or mediastinal lymph node enlargement.  The hilar contours are unremarkable on this non-contrast examination.    The esophagus maintains a normal course and caliber.    Limited views of the abdomen demonstrate significant renal atrophy with left renal hypodensities which are too small to characterize.  Surgical material is seen within the region of the left adrenal gland in this patient with a history of prior adrenal surgery.  Nodular hypodensity of the right adrenal gland measuring at least 1.7 cm.  Although incompletely characterized, the density measures approximately 0 HU (series 2, image 119), consistent with an adenoma.     The osseous structures demonstrate degenerative change of the spine.                             X-Ray Chest 1 View (Final result)  Result time 08/26/17 16:15:11    Final result by Dustin Delacruz MD (08/26/17 16:15:11)                 Impression:     No significant interval change      Electronically signed by: Dr. DUSTIN DELACRUZ MD  Date:     08/26/17  Time:    16:15              Narrative:    Comparison: 12/13/2016    Results: Single view. Right sided central venous catheter has been removed. Cardiac silhouette is unchanged. Atherosclerotic calcification in the wall of the aortic arch. Pulmonary vascularity is prominent but stable. Continued mild blunting of both costophrenic angles with a little  "increased opacity at both lung bases likely representing mild volume loss/consolidation. No pneumothorax.                              Estimated body mass index is 26.91 kg/m² as calculated from the following:    Height as of this encounter: 5' (1.524 m).    Weight as of this encounter: 62.5 kg (137 lb 12.6 oz).    I & O (Last 24H):    Intake/Output Summary (Last 24 hours) at 08/28/17 1306  Last data filed at 08/28/17 0500   Gross per 24 hour   Intake              420 ml   Output             3000 ml   Net            -2580 ml       Estimated Creatinine Clearance: 6.6 mL/min (based on Cr of 6.2).    ASSESSMENT/PLAN:     Active Problems:    Active Hospital Problems    Diagnosis  POA    *Acute hypoxemic respiratory failure [J96.01]CT chest - Moderate sized bilateral pleural effusions, right greater than left with associated atelectatic change and bandlike opacities, most prominent within the right lower lobe. s/p pulmonology evaluation.Prior pleural tap 06/16- transudative. likely from worsening diastolic CHF, no plan for thoracentesis. on oxygen taper 3L --> 1L via nasal canula.taper as tolerated. May need home oxygen . S/p HD yesterday and ultrafiltration 0f 2.7L . needs ultrafiltration with HD  Yes    Acute on chronic combined systolic and diastolic heart failure [I50.43]BNP 5k. makes little urine. Nephrology consulted . Echo with Normal left ventricular systolic function (EF 60-65%). Right ventricular enlargement with mildly depressed systolic function.     Pulmonary hypertension  - with estimated PA systolic pressure is 80 mmHg.   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 SBP at 160/54. continue clonidine, losartan , carvedilol. Patient does not want to take hydralazine as it gave her "pounding in the chest" -she discontinued on her own. mentions she has "whitecoat hypertension" . blood pressure controlled this AM with SBP 130s  Yes    " Pleural effusion [J90]as above   Yes    Pulmonary nodules [R91.8]  Unknown     Multiple bilateral pulmonary nodules, the largest of which measures 0.7 x 0.5 cm within the apical segment of the right upper lobe (axial series 2, image 32). Per Fleischner Society 2017 guidelines, in a low or high risk patient: CT chest 3-6 months with followup CT chest in 18-24 months.      Adrenal adenoma [D35.00]  Unknown     Low-density nodular thickening of the right adrenal gland      SOB (shortness of breath) [R06.02]likley secondary to pleural effusions   Yes    ESRD (end stage renal disease) on dialysis [N18.6, Z99.2]Nephrology consulted for HD. had full session yesterday but no fluid removed. For HD in AM   Not Applicable     Chronic    Anemia of chronic renal failure, stage 5 [N18.5, D63.1]  Hb of 10.2 stable. monitor   Yes     Chronic    Type II diabetes mellitus with renal manifestations [E11.29]hb A1c of 6.4. not on medications at home. diet controlled   Yes      Resolved Hospital Problems    Diagnosis Date Resolved POA   No resolved problems to display.         Disposition- Home     DVT prophylaxis addressed with:SQ heparin             Alfred Ernandez MD  Attending Staff Physician  Hospital Medicine  pager- [phone] [facility] - [phone]

## 2017-08-28 NOTE — PLAN OF CARE
"Problem: Patient Care Overview  Goal: Plan of Care Review  Outcome: Ongoing (interventions implemented as appropriate)  Pt free of falls, injury this shift. POC discussed with pt at bedside, verbalized understanding. Pt SB on monitor, 2100 dose of Coreg held. Pt's BP elevated at beginning of shift, see flowsheet; 0.1 mg Clonidine PO given; pt tolerated well. Pt states "I wish y'all would stop trying to bring my blood pressure down; it's not going to go down until I get out of here. I've had white coat syndrome for 40 years now,". Educated pt on risks and complications with continual hypertension in the 190s/80s, pt verbalized understanding. Heparin subq continued for VTE risk, tolerating well. Pt voices no concerns, all questions answered. Will continue to monitor.      "

## 2017-08-28 NOTE — PROGRESS NOTES
Pt completed two hour UF only treatment. Net UF 2.7 liters. Pt tolerated well with minimal cramping to left hand close to end of treatment resolved with lowering UF goal. BLood returned with normal saline to left thigh graft, two needles removed, pressure held for 15 mins, hemostasis achieved, covered with gauze and paper tape. +bruit/+thrill. Report given to primary RN.

## 2017-08-29 PROBLEM — I51.7 BIATRIAL ENLARGEMENT: Status: ACTIVE | Noted: 2017-08-29

## 2017-08-29 PROBLEM — T82.9XXA DIALYSIS COMPLICATION: Status: ACTIVE | Noted: 2017-08-29

## 2017-08-29 PROBLEM — E55.9 VITAMIN D DEFICIENCY: Status: ACTIVE | Noted: 2017-08-29

## 2017-08-29 PROBLEM — I50.811 ACUTE RIGHT-SIDED CHF (CONGESTIVE HEART FAILURE): Status: ACTIVE | Noted: 2017-08-29

## 2017-08-29 PROBLEM — R06.02 SOB (SHORTNESS OF BREATH): Status: RESOLVED | Noted: 2017-08-26 | Resolved: 2017-08-29

## 2017-08-29 PROBLEM — I07.1 MODERATE TRICUSPID REGURGITATION: Status: ACTIVE | Noted: 2017-08-29

## 2017-08-29 LAB
ANION GAP SERPL CALC-SCNC: 12 MMOL/L
BASOPHILS # BLD AUTO: 0.05 K/UL
BASOPHILS NFR BLD: 0.7 %
BUN SERPL-MCNC: 75 MG/DL
CALCIUM SERPL-MCNC: 8.5 MG/DL
CHLORIDE SERPL-SCNC: 95 MMOL/L
CO2 SERPL-SCNC: 27 MMOL/L
CREAT SERPL-MCNC: 7.6 MG/DL
DIFFERENTIAL METHOD: ABNORMAL
EOSINOPHIL # BLD AUTO: 0.3 K/UL
EOSINOPHIL NFR BLD: 3.7 %
ERYTHROCYTE [DISTWIDTH] IN BLOOD BY AUTOMATED COUNT: 18.7 %
EST. GFR  (AFRICAN AMERICAN): 5.5 ML/MIN/1.73 M^2
EST. GFR  (NON AFRICAN AMERICAN): 4.8 ML/MIN/1.73 M^2
GLUCOSE SERPL-MCNC: 133 MG/DL
HCT VFR BLD AUTO: 31.4 %
HGB BLD-MCNC: 10.4 G/DL
LYMPHOCYTES # BLD AUTO: 1.3 K/UL
LYMPHOCYTES NFR BLD: 18 %
MAGNESIUM SERPL-MCNC: 2.2 MG/DL
MCH RBC QN AUTO: 30.9 PG
MCHC RBC AUTO-ENTMCNC: 33.1 G/DL
MCV RBC AUTO: 93 FL
MONOCYTES # BLD AUTO: 0.7 K/UL
MONOCYTES NFR BLD: 9.5 %
NEUTROPHILS # BLD AUTO: 4.8 K/UL
NEUTROPHILS NFR BLD: 68 %
PHOSPHATE SERPL-MCNC: 5.8 MG/DL
PLATELET # BLD AUTO: 264 K/UL
PMV BLD AUTO: 12.9 FL
POCT GLUCOSE: 112 MG/DL (ref 70–110)
POTASSIUM SERPL-SCNC: 4.9 MMOL/L
RBC # BLD AUTO: 3.37 M/UL
SODIUM SERPL-SCNC: 134 MMOL/L
WBC # BLD AUTO: 7.06 K/UL

## 2017-08-29 PROCEDURE — 27000221 HC OXYGEN, UP TO 24 HOURS

## 2017-08-29 PROCEDURE — 27000173 HC ACAPELLA DEVICE DH OR DM

## 2017-08-29 PROCEDURE — 63600175 PHARM REV CODE 636 W HCPCS: Performed by: INTERNAL MEDICINE

## 2017-08-29 PROCEDURE — 94761 N-INVAS EAR/PLS OXIMETRY MLT: CPT

## 2017-08-29 PROCEDURE — 25000003 PHARM REV CODE 250: Performed by: NURSE PRACTITIONER

## 2017-08-29 PROCEDURE — 80048 BASIC METABOLIC PNL TOTAL CA: CPT

## 2017-08-29 PROCEDURE — 25000003 PHARM REV CODE 250: Performed by: HOSPITALIST

## 2017-08-29 PROCEDURE — 25000003 PHARM REV CODE 250: Performed by: INTERNAL MEDICINE

## 2017-08-29 PROCEDURE — 90935 HEMODIALYSIS ONE EVALUATION: CPT | Mod: ,,, | Performed by: NURSE PRACTITIONER

## 2017-08-29 PROCEDURE — 36415 COLL VENOUS BLD VENIPUNCTURE: CPT

## 2017-08-29 PROCEDURE — 94664 DEMO&/EVAL PT USE INHALER: CPT

## 2017-08-29 PROCEDURE — 99233 SBSQ HOSP IP/OBS HIGH 50: CPT | Mod: ,,, | Performed by: INTERNAL MEDICINE

## 2017-08-29 PROCEDURE — 84100 ASSAY OF PHOSPHORUS: CPT

## 2017-08-29 PROCEDURE — 85025 COMPLETE CBC W/AUTO DIFF WBC: CPT

## 2017-08-29 PROCEDURE — 83735 ASSAY OF MAGNESIUM: CPT

## 2017-08-29 PROCEDURE — 80100016 HC MAINTENANCE HEMODIALYSIS

## 2017-08-29 PROCEDURE — 25000003 PHARM REV CODE 250: Performed by: PHYSICIAN ASSISTANT

## 2017-08-29 PROCEDURE — 20600001 HC STEP DOWN PRIVATE ROOM

## 2017-08-29 PROCEDURE — 90935 HEMODIALYSIS ONE EVALUATION: CPT | Mod: ,,, | Performed by: INTERNAL MEDICINE

## 2017-08-29 RX ORDER — ASCORBIC ACID 500 MG
500 TABLET ORAL NIGHTLY
Status: DISCONTINUED | OUTPATIENT
Start: 2017-08-29 | End: 2017-09-02 | Stop reason: HOSPADM

## 2017-08-29 RX ORDER — CARVEDILOL 25 MG/1
25 TABLET ORAL NIGHTLY
Status: DISCONTINUED | OUTPATIENT
Start: 2017-08-29 | End: 2017-09-02 | Stop reason: HOSPADM

## 2017-08-29 RX ORDER — CARVEDILOL 25 MG/1
25 TABLET ORAL 2 TIMES DAILY
Status: DISCONTINUED | OUTPATIENT
Start: 2017-08-29 | End: 2017-08-29

## 2017-08-29 RX ORDER — ACETAMINOPHEN 500 MG
500 TABLET ORAL
Status: DISCONTINUED | OUTPATIENT
Start: 2017-08-30 | End: 2017-08-30

## 2017-08-29 RX ORDER — SODIUM CHLORIDE 9 MG/ML
INJECTION, SOLUTION INTRAVENOUS ONCE
Status: DISCONTINUED | OUTPATIENT
Start: 2017-08-29 | End: 2017-08-30

## 2017-08-29 RX ORDER — VITAMIN E 268 MG
400 CAPSULE ORAL NIGHTLY
Status: DISCONTINUED | OUTPATIENT
Start: 2017-08-29 | End: 2017-09-02 | Stop reason: HOSPADM

## 2017-08-29 RX ORDER — CLONIDINE HYDROCHLORIDE 0.1 MG/1
0.1 TABLET ORAL
Status: DISCONTINUED | OUTPATIENT
Start: 2017-08-30 | End: 2017-09-02 | Stop reason: HOSPADM

## 2017-08-29 RX ORDER — CLONIDINE HYDROCHLORIDE 0.1 MG/1
0.1 TABLET ORAL NIGHTLY
Status: DISCONTINUED | OUTPATIENT
Start: 2017-08-30 | End: 2017-09-02 | Stop reason: HOSPADM

## 2017-08-29 RX ORDER — CAPSAICIN 0.03 G/100G
CREAM TOPICAL DAILY PRN
Status: DISCONTINUED | OUTPATIENT
Start: 2017-08-29 | End: 2017-09-02 | Stop reason: HOSPADM

## 2017-08-29 RX ORDER — SODIUM CHLORIDE 9 MG/ML
INJECTION, SOLUTION INTRAVENOUS
Status: DISCONTINUED | OUTPATIENT
Start: 2017-08-29 | End: 2017-09-02 | Stop reason: HOSPADM

## 2017-08-29 RX ORDER — ACETAMINOPHEN 325 MG/1
650 TABLET ORAL EVERY 6 HOURS PRN
Status: DISCONTINUED | OUTPATIENT
Start: 2017-08-29 | End: 2017-09-02 | Stop reason: HOSPADM

## 2017-08-29 RX ADMIN — SEVELAMER CARBONATE 1600 MG: 800 TABLET, FILM COATED ORAL at 08:08

## 2017-08-29 RX ADMIN — HEPARIN SODIUM 5000 UNITS: 5000 INJECTION, SOLUTION INTRAVENOUS; SUBCUTANEOUS at 09:08

## 2017-08-29 RX ADMIN — CARVEDILOL 25 MG: 25 TABLET, FILM COATED ORAL at 09:08

## 2017-08-29 RX ADMIN — CLONIDINE HYDROCHLORIDE 0.1 MG: 0.1 TABLET ORAL at 06:08

## 2017-08-29 RX ADMIN — LOSARTAN POTASSIUM 100 MG: 50 TABLET, FILM COATED ORAL at 01:08

## 2017-08-29 RX ADMIN — SEVELAMER CARBONATE 1600 MG: 800 TABLET, FILM COATED ORAL at 02:08

## 2017-08-29 RX ADMIN — OXYCODONE HYDROCHLORIDE AND ACETAMINOPHEN 500 MG: 500 TABLET ORAL at 09:08

## 2017-08-29 RX ADMIN — CLONIDINE HYDROCHLORIDE 0.1 MG: 0.1 TABLET ORAL at 01:08

## 2017-08-29 RX ADMIN — HEPARIN SODIUM 5000 UNITS: 5000 INJECTION, SOLUTION INTRAVENOUS; SUBCUTANEOUS at 06:08

## 2017-08-29 RX ADMIN — SODIUM CHLORIDE: 0.9 INJECTION, SOLUTION INTRAVENOUS at 08:08

## 2017-08-29 RX ADMIN — CALCITRIOL 0.25 MCG: 0.25 CAPSULE, LIQUID FILLED ORAL at 01:08

## 2017-08-29 RX ADMIN — AMLODIPINE BESYLATE 10 MG: 10 TABLET ORAL at 01:08

## 2017-08-29 RX ADMIN — SEVELAMER CARBONATE 1600 MG: 800 TABLET, FILM COATED ORAL at 06:08

## 2017-08-29 RX ADMIN — HEPARIN SODIUM 5000 UNITS: 5000 INJECTION, SOLUTION INTRAVENOUS; SUBCUTANEOUS at 02:08

## 2017-08-29 RX ADMIN — STANDARDIZED SENNA CONCENTRATE AND DOCUSATE SODIUM 1 TABLET: 8.6; 5 TABLET, FILM COATED ORAL at 09:08

## 2017-08-29 RX ADMIN — VITAMIN E CAP 400 UNIT 400 UNITS: 400 CAP at 09:08

## 2017-08-29 NOTE — NURSING
Off floor to HD via w/c, portablee 02 at 3l/nc. Tele maintained. No acute distress.. Breakfast ordered, to be sent to HD.

## 2017-08-29 NOTE — PLAN OF CARE
Problem: Patient Care Overview  Goal: Plan of Care Review  Outcome: Ongoing (interventions implemented as appropriate)  Pt. Remains free from falls/ injury/trauma. No complaints of CP, SOB, or discomfort. HD scheduled in the am. Plan of Care reviewed with patient. VSS and NADN. Will continue to monitor.

## 2017-08-29 NOTE — PLAN OF CARE
Problem: Patient Care Overview  Goal: Plan of Care Review  Outcome: Ongoing (interventions implemented as appropriate)  Unable to wean 02, maintained at 3l/nc, ambulated in hallway approx  250ft t. Denies complaints of sob, ra pulse ox 84%, 3lnc applied, pulse ox 90-91%. Hemodialysis today, 3l removed. Will dialyze on Wednesday as well. No fall related injury. Plan of care discussed with pt and daughter.

## 2017-08-29 NOTE — NURSING
Home Oxygen Evaluation    Date Performed: 2017    1) Patient's Home O2 Sat on room air, while at rest: 87        If O2 sats on room air at rest are 88% or below, patient qualifies. No additional testing needed. Document N/A in steps 2 and 3. If 89% or above, complete steps 2.      2) Patient's O2 Sat on room air while exercisin        If O2 sats on room air while exercising remain 89% or above patient does not qualify, no further testing needed Document N/A in step 3. If O2 sats on room air while exercising are 88% or below, continue to step 3.      3) Patient's O2 Sat while exercising on O2: 91 at 2 LPM         (Must show improvement from #2 for patients to qualify)    If O2 sats improve on oxygen, patient qualifies for portable oxygen. If not, the patient does not qualify.

## 2017-08-29 NOTE — PROGRESS NOTES
HD treatment started. No complications with access to left thigh. Lines secured and telemetry in place. No complaints of discomfort at this time.

## 2017-08-29 NOTE — PLAN OF CARE
Visit with pt to discuss d/c plans.  Pt at HD, daughter at bedside.  Daughter states prior to admission, pt was independent with all ADL's.  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  Pt attends HD at [facility] where she still drives herself for sessions.  Plan at time of d/c is to return to home.  Pt may need O2 at time of d/c.  Will need to assess need for home O2 once returns to floor.       08/29/17 0951   Discharge Assessment   Assessment Type Discharge Planning Assessment   Confirmed/corrected address and phone number on facesheet? Yes   Assessment information obtained from? Patient   Expected Length of Stay (days) 3   Communicated expected length of stay with patient/caregiver yes   Prior to hospitilization cognitive status: Alert/Oriented   Prior to hospitalization functional status: Independent   Current cognitive status: Alert/Oriented   Current Functional Status: Independent   Lives With child(samara), adult   Able to Return to Prior Arrangements yes   Is patient able to care for self after discharge? Yes   Patient's perception of discharge disposition admitted as an inpatient   Readmission Within The Last 30 Days no previous admission in last 30 days   Patient currently being followed by outpatient case management? No   Patient currently receives any other outside agency services? No   Equipment Currently Used at Home none   Do you have any problems affording any of your prescribed medications? No   Is the patient taking medications as prescribed? yes   Does the patient have transportation home? No   Does the patient receive services at the [facility]? No   Discharge Plan A Home with family   Discharge Plan B Home Health;Home   Patient/Family In Agreement With Plan yes

## 2017-08-29 NOTE — PLAN OF CARE
Problem: Patient Care Overview  Goal: Plan of Care Review  Outcome: Ongoing (interventions implemented as appropriate)  HD treatment complete. Duration of treatment 4 hours and 3 L removed. Treatment was tolerated well and no complications with access to left thigh. Needles removed and hemostasis achieved. Dressing intact and no drainage noted. Thrill and bruit present

## 2017-08-29 NOTE — NURSING
REPEAT BLOOD PRESSURE: 196/81, REPORTED TO REYNA STANLEY TO REAPPLY CLONDINE PATCH NOW.. PASSED ON TO PM RN.

## 2017-08-29 NOTE — PROGRESS NOTES
[facility] H[facility]OD[facility] NOTE     Patient currently on hemodialysis for removal of uremic toxins and volume.     Patient seen and evaluated on hemodialysis, tolerating treatment, see HD flowsheet for vitals and assessments.      No Hypotension, chest pain, shortness of breath, cramping, nausea or vomiting.       Recent Labs  Lab 08/27/17  0503 08/28/17  0506 08/29/17  0416   WBC 6.96 7.15 7.06   HGB 10.2* 10.1* 10.4*   HCT 32.3* 31.6* 31.4*    262 264       Recent Labs  Lab 08/27/17  0503 08/28/17  0506 08/29/17  0416    137 134*   K 4.2 4.6 4.9   CL 98 95 95   CO2 30* 26 27   BUN 28* 48* 75*   CREATININE 4.3* 6.2* 7.6*   * 121* 133*   CALCIUM 8.7 8.5* 8.5*   MG 1.9 2.1 2.2   PHOS 4.9* 5.3* 5.8*       Recent Labs  Lab 08/26/17  1517   ALKPHOS 132   ALT 49*   AST 24   ALBUMIN 3.6   PROT 8.0   BILITOT 0.7      Lab Results   Component Value Date    PTH 89.0 (H) 06/13/2016    CALCIUM 8.5 (L) 08/29/2017    CAION 0.88 (L) 03/17/2016    PHOS 5.8 (H) 08/29/2017       Exam  AAOx 3  Lungs diminished, poor air exchange  Heart S1S2  Extremities  No edema;  TDOD on  Access Left thigh AVG     ASSESSMENT/PLAN:  ESRD on IHD   -Patient admitted for hypoxia found on CT chest mod sized bilateral pleural effusions and pulmonary nodules, R>L. Pulmonary consulted-no thoracenteses at this time.    -8/27 2 D echo normal EF, triv pericardial effusion, and pulmonary htn (PASP 80.44 similar to one year ago).   -HD secondary to htn/DM since 6/2016 on TTS 4 hrs duration [facility]. EDW 61 kg   - Will provide dialysis for metabolic clearance and volume   - Seen and examined today during hemodialysis; tolerating treatment well without issues. Denied headaches, chest pain, abdominal pain, or muscle cramps   - Target UF 3 L (pre wt 64 kg). UF 2.7 L on admission 8/27.    - Dialysate adjusted to current labs   - UF tomorrow  3-4 L.      Anemia of Chronic Kidney Disease   - Hgb at goal. Ampxzof533 mcg IVP every  2 weeks.. Last dose 8/21.       Mineral Bone Disease in CKD   -  Continue Renvela and calcitriol. Renal diet.       Mariella Viera NP  Nephrology

## 2017-08-29 NOTE — NURSING
From hemodialysis via stretcher, ambulated to bed without difficulty. o2 maintained at 3l/nc. Up to recliner. Will continue to monitor. Lt thigh HD access with gauze and tape dry and intact. Daughter at bedside, will continue to monitor.

## 2017-08-30 LAB
25(OH)D3+25(OH)D2 SERPL-MCNC: 21 NG/ML
ANION GAP SERPL CALC-SCNC: 12 MMOL/L
BUN SERPL-MCNC: 47 MG/DL
CALCIUM SERPL-MCNC: 8.9 MG/DL
CHLORIDE SERPL-SCNC: 104 MMOL/L
CO2 SERPL-SCNC: 19 MMOL/L
CREAT SERPL-MCNC: 5.2 MG/DL
EST. GFR  (AFRICAN AMERICAN): 8.7 ML/MIN/1.73 M^2
EST. GFR  (NON AFRICAN AMERICAN): 7.6 ML/MIN/1.73 M^2
ESTIMATED AVG GLUCOSE: 126 MG/DL
GLUCOSE SERPL-MCNC: 118 MG/DL
HBA1C MFR BLD HPLC: 6 %
MAGNESIUM SERPL-MCNC: 2.3 MG/DL
PHOSPHATE SERPL-MCNC: 4.7 MG/DL
POCT GLUCOSE: 103 MG/DL (ref 70–110)
POTASSIUM SERPL-SCNC: 5.1 MMOL/L
SODIUM SERPL-SCNC: 135 MMOL/L

## 2017-08-30 PROCEDURE — 20600001 HC STEP DOWN PRIVATE ROOM

## 2017-08-30 PROCEDURE — 25000003 PHARM REV CODE 250: Performed by: INTERNAL MEDICINE

## 2017-08-30 PROCEDURE — 99232 SBSQ HOSP IP/OBS MODERATE 35: CPT | Mod: ,,, | Performed by: INTERNAL MEDICINE

## 2017-08-30 PROCEDURE — 84100 ASSAY OF PHOSPHORUS: CPT

## 2017-08-30 PROCEDURE — 90935 HEMODIALYSIS ONE EVALUATION: CPT | Mod: ,,, | Performed by: NURSE PRACTITIONER

## 2017-08-30 PROCEDURE — 25000003 PHARM REV CODE 250: Performed by: NURSE PRACTITIONER

## 2017-08-30 PROCEDURE — 80048 BASIC METABOLIC PNL TOTAL CA: CPT

## 2017-08-30 PROCEDURE — 63600175 PHARM REV CODE 636 W HCPCS: Performed by: INTERNAL MEDICINE

## 2017-08-30 PROCEDURE — 83735 ASSAY OF MAGNESIUM: CPT

## 2017-08-30 PROCEDURE — 97165 OT EVAL LOW COMPLEX 30 MIN: CPT

## 2017-08-30 PROCEDURE — 80100016 HC MAINTENANCE HEMODIALYSIS

## 2017-08-30 PROCEDURE — 82306 VITAMIN D 25 HYDROXY: CPT

## 2017-08-30 PROCEDURE — 25000003 PHARM REV CODE 250: Performed by: HOSPITALIST

## 2017-08-30 PROCEDURE — 83036 HEMOGLOBIN GLYCOSYLATED A1C: CPT

## 2017-08-30 PROCEDURE — 97161 PT EVAL LOW COMPLEX 20 MIN: CPT

## 2017-08-30 RX ORDER — SODIUM CHLORIDE 9 MG/ML
INJECTION, SOLUTION INTRAVENOUS
Status: DISCONTINUED | OUTPATIENT
Start: 2017-08-30 | End: 2017-09-02 | Stop reason: HOSPADM

## 2017-08-30 RX ORDER — ACETAMINOPHEN 500 MG
500 TABLET ORAL
Status: DISCONTINUED | OUTPATIENT
Start: 2017-08-31 | End: 2017-09-02 | Stop reason: HOSPADM

## 2017-08-30 RX ORDER — LOSARTAN POTASSIUM 50 MG/1
100 TABLET ORAL
Status: DISCONTINUED | OUTPATIENT
Start: 2017-08-31 | End: 2017-09-02 | Stop reason: HOSPADM

## 2017-08-30 RX ORDER — SODIUM CHLORIDE 9 MG/ML
INJECTION, SOLUTION INTRAVENOUS ONCE
Status: COMPLETED | OUTPATIENT
Start: 2017-08-30 | End: 2017-08-30

## 2017-08-30 RX ORDER — AMLODIPINE BESYLATE 10 MG/1
10 TABLET ORAL
Status: DISCONTINUED | OUTPATIENT
Start: 2017-08-31 | End: 2017-09-02 | Stop reason: HOSPADM

## 2017-08-30 RX ADMIN — VITAMIN E CAP 400 UNIT 400 UNITS: 400 CAP at 01:08

## 2017-08-30 RX ADMIN — CLONIDINE HYDROCHLORIDE 0.1 MG: 0.1 TABLET ORAL at 01:08

## 2017-08-30 RX ADMIN — AMLODIPINE BESYLATE 10 MG: 10 TABLET ORAL at 01:08

## 2017-08-30 RX ADMIN — CLONIDINE HYDROCHLORIDE 0.1 MG: 0.1 TABLET ORAL at 09:08

## 2017-08-30 RX ADMIN — ASPIRIN 81 MG CHEWABLE TABLET 81 MG: 81 TABLET CHEWABLE at 01:08

## 2017-08-30 RX ADMIN — OXYCODONE HYDROCHLORIDE AND ACETAMINOPHEN 500 MG: 500 TABLET ORAL at 09:08

## 2017-08-30 RX ADMIN — HEPARIN SODIUM 5000 UNITS: 5000 INJECTION, SOLUTION INTRAVENOUS; SUBCUTANEOUS at 01:08

## 2017-08-30 RX ADMIN — SEVELAMER CARBONATE 1600 MG: 800 TABLET, FILM COATED ORAL at 05:08

## 2017-08-30 RX ADMIN — CARVEDILOL 25 MG: 25 TABLET, FILM COATED ORAL at 09:08

## 2017-08-30 RX ADMIN — HEPARIN SODIUM 5000 UNITS: 5000 INJECTION, SOLUTION INTRAVENOUS; SUBCUTANEOUS at 09:08

## 2017-08-30 RX ADMIN — VITAMIN E CAP 400 UNIT 400 UNITS: 400 CAP at 09:08

## 2017-08-30 RX ADMIN — HEPARIN SODIUM 5000 UNITS: 5000 INJECTION, SOLUTION INTRAVENOUS; SUBCUTANEOUS at 05:08

## 2017-08-30 RX ADMIN — SEVELAMER CARBONATE 1600 MG: 800 TABLET, FILM COATED ORAL at 01:08

## 2017-08-30 RX ADMIN — CALCITRIOL 0.25 MCG: 0.25 CAPSULE, LIQUID FILLED ORAL at 01:08

## 2017-08-30 RX ADMIN — SODIUM CHLORIDE: 0.9 INJECTION, SOLUTION INTRAVENOUS at 08:08

## 2017-08-30 RX ADMIN — LOSARTAN POTASSIUM 100 MG: 50 TABLET, FILM COATED ORAL at 01:08

## 2017-08-30 NOTE — PLAN OF CARE
Problem: Occupational Therapy Goal  Goal: Occupational Therapy Goal  is currently performing ADLs, functional mobility & t/fs without assistance and displays age-appropriate strength, endurance & balance. OT services are not recommended at this time and patient is safe to D/C home. Pt has good family support.  Outcome: Outcome(s) achieved Date Met: 08/30/17  RENATA Dove/SAYRA      8/30/2017

## 2017-08-30 NOTE — PLAN OF CARE
Problem: Patient Care Overview  Goal: Individualization & Mutuality  Plan of care discussed with patient. Patient is free of fall/trauma/injury. Denies CP, SOB, or pain/discomfort. VS stable. AAOx4. Dialysis completed today, 2.9 L removed. All questions addressed. Will continue to monitor

## 2017-08-30 NOTE — NURSING
Maintenance HD treatment initiated via Left thigh graft using 15 g needles, lines secured, orders verified, POC reviewed with patient.

## 2017-08-30 NOTE — PROGRESS NOTES
Progress Note  Hospital Medicine      Admit Date: 8/26/2017    SUBJECTIVE:     Follow-up For:  Acute hypoxemic respiratory failure    HPI/Interval history: No new complaints.     Review of Systems: Gen: no fever, no chills, Heart: no chest pain, palpitations; Resp: no SOB, no cough    OBJECTIVE:     Vital Signs Range (Last 24H):  Temp:  [97.6 °F (36.4 °C)-98.5 °F (36.9 °C)]   Pulse:  [45-58]   Resp:  [18]   BP: (134-189)/(42-92)   SpO2:  [94 %-98 %]     Physical Exam:  General appearance: NAD, conversant  Neck: FROM, supple   Lungs: crackles on right base to mid-lung field, no accessory muscle use  CV: RRR, no heave  Abdomen: Soft, non-tender; no masses or HSM  Extremities: No peripheral edema or digital cyanosis  Skin: no rash, lesions or ulcers  Psych: Alert and oriented to person, place and time      Recent Labs  Lab 08/27/17  0503 08/28/17  0506 08/29/17  0416    137 134*   K 4.2 4.6 4.9   CL 98 95 95   CO2 30* 26 27   BUN 28* 48* 75*   CREATININE 4.3* 6.2* 7.6*   * 121* 133*   CALCIUM 8.7 8.5* 8.5*   MG 1.9 2.1 2.2   PHOS 4.9* 5.3* 5.8*       Recent Labs  Lab 08/26/17  1517   ALKPHOS 132   ALT 49*   AST 24   ALBUMIN 3.6   PROT 8.0   BILITOT 0.7         Recent Labs  Lab 08/27/17  0503 08/28/17  0506 08/29/17  0416   WBC 6.96 7.15 7.06   HGB 10.2* 10.1* 10.4*   HCT 32.3* 31.6* 31.4*    262 264   GRAN 73.3*  5.1 67.7  4.8 68.0  4.8   LYMPH 13.9*  1.0 14.8*  1.1 18.0  1.3   MONO 8.5  0.6 11.0  0.8 9.5  0.7         Recent Labs  Lab 08/29/17  1157   POCTGLUCOSE 112*       ASSESSMENT/PLAN:   Acute hypoxic respiratory failure  Bilateral pleural effusion  Pulmonary HTN (PASP 80)   Hypervolemia  ESRF on HD  Nephrology consulted for dialysis  Due to well-known history of non-compliance and non-compliance with allow effective dialysis due to cramping and malaise.   Daily dialysis until off oxygen.   Daily ambulatory oxygen saturations.  Fluid restriction of 800 mL - strict I/Os   and 138  on admission.   Will hold clonidine and carvedilol AM doses to allow for volume removal without concern for dialysis associated hypotension  Acapella QID  Pulmonary consulted - no need for thoracentesis as previously done 6/2016 was consistent with transudate. Fluid should be removed with dialysis.     dialysis associated cramping  Vitamin E 400 units and vitamin C 500 mg qhs  Acetaminophen 500 mg prior to dialysis  Hold AM doses of clonidine and carvedilol to decrease risk of dialysis associated hypotension  Emphasize fluid restriction compliance to reduce interdialytic weight gains    Pulmonary nodules  Likely due to chronic pulmonary edema. Fluid removal as above. Need repeat CT scan in 3-6 months.     Mild Right sided heart failure  Moderate tricuspid regurgitation  biatrial enlargement  BNP >5000 whereas previous value in 100s  Volume removal as above.     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 Continue losartan 100 mg daily, clonidine 0.1 mg TID and carvedilol 25 mg BID. Holding AM doses of clondine and carvedilol

## 2017-08-30 NOTE — PT/OT/SLP EVAL
"Physical Therapy  Evaluation/Discharge    Beth Salcedo   MRN: [id number]   Admitting Diagnosis: Acute hypoxemic respiratory failure    PT Received On: 08/30/17  PT Start Time: 1311     PT Stop Time: 1326    PT Total Time (min): 15 min       Billable Minutes:  Evaluation 15 (co-eval with OT)    Diagnosis: Acute hypoxemic respiratory failure  ESRD on dialysis     Past Medical History:   Diagnosis Date    Anemia of chronic renal failure, stage 5     Chronic kidney disease     Diabetes mellitus type II     Diastolic heart failure     ESRD on dialysis since 6/10/16     Essential hypertension     Hyperlipidemia     Hypertension     LVH (left ventricular hypertrophy)     Secondary hyperparathyroidism of renal origin       Past Surgical History:   Procedure Laterality Date    ADRENAL GLAND SURGERY  1990s    removed for tumor which she states was benign     HYSTERECTOMY  1990s    removed for tumor which she states was benign     permcath placement         Referring physician: Susan Kapadia MD  Date referred to PT: 8/29/17    General Precautions: Standard, fall  Orthopedic Precautions: N/A   Braces: N/A       Do you have any cultural, spiritual, Zoroastrianism conflicts, given your current situation?: none noted     Patient History:  Lives With: child(samara), adult  Living Arrangements: house  Home Accessibility: stairs within home  Number of Stairs Within Home: 3 (from den to rest of house)  Stair Railings at Home: none  Living Environment Comment: Pt lives with her daughter in a 1SH with 3 steps within home. Pt reports that PTA she was fully (I) with ADLs and ambulation; working as cook at  next door, and driving.   Equipment Currently Used at Home: shower chair    Previous Level of Function:  Ambulation Skills: independent  Transfer Skills: independent  ADL Skills: independent  Work/Leisure Activity: independent    Subjective:  Communicated with RN prior to session.  "I don't need any therapy. " "This is just to make money."   Chief Complaint: participating in therapy evaluation   Patient goals: return home without therapy     Pain/Comfort  Pain Rating 1: 0/10      Objective:   Patient found with: telemetry, oxygen     Cognitive Exam:  Oriented to: Person, Place, Time and Situation    Follows Commands/attention: Follows two-step commands   Communication: clear/fluent  Safety awareness/insight to disability: intact    Physical Exam:  Postural examination/scapula alignment: Rounded shoulder    Skin integrity: Visible skin intact  Edema: None noted     Sensation:   Intact    Lower Extremity Range of Motion:  Right Lower Extremity: WFL  Left Lower Extremity: WFL    Lower Extremity Strength:  Right Lower Extremity: WFL  Left Lower Extremity: WFL      Functional Mobility:  Bed Mobility:  Supine to Sit:  (not performed 2* pt seated on bedside couch before and after treatment session)    Transfers:  Sit <> Stand Assistance: Independent  Sit <> Stand Assistive Device: No Assistive Device    Gait:   Gait Distance: ~200 ft.   Assistance 1: Supervision  Gait Assistive Device: No device  Gait Pattern: reciprocal  Gait Deviation(s): decreased waqas, decreased step length (mild instability )    3L portable O2 in place throughout   Mild SOB noted, but pt denied feeling SOB.    Pt able to self-correct instability throughout gait.     Balance:   Static Sit: independent   Dynamic Sit: independent   Static Stand: supervision   Dynamic stand: supervision     Therapeutic Activities and Exercises:  Pt educated on role of PT and PT POC. Pt required repeated education to understand therapy role and reason for initial evaluation. Pt educated that she will be d/c from IP PT services at this time. Pt verbalized understanding.     AM-PAC 6 CLICK MOBILITY  How much help from another person does this patient currently need?   1 = Unable, Total/Dependent Assistance  2 = A lot, Maximum/Moderate Assistance  3 = A little, Minimum/Contact " Guard/Supervision  4 = None, Modified [place]/Independent    Turning over in bed (including adjusting bedclothes, sheets and blankets)?: 4  Sitting down on and standing up from a chair with arms (e.g., wheelchair, bedside commode, etc.): 4  Moving from lying on back to sitting on the side of the bed?: 4  Moving to and from a bed to a chair (including a wheelchair)?: 4  Need to walk in hospital room?: 4  Climbing 3-5 steps with a railing?: 3  Total Score: 23     AM-PAC Raw Score CMS G-Code Modifier Level of Impairment Assistance   6 % Total / Unable   7 - 9 CM 80 - 100% Maximal Assist   10 - 14 CL 60 - 80% Moderate Assist   15 - 19 CK 40 - 60% Moderate Assist   20 - 22 CJ 20 - 40% Minimal Assist   23 CI 1-20% SBA / CGA   24 CH 0% Independent/ Mod I     Patient left seated on bedside couch with all lines intact, call button in reach and RN present.    Assessment:   Beth Salcedo is a 72 y.o. female with a medical diagnosis of Acute hypoxemic respiratory failure. Pt required continued education on role of PT and PT POC to participate in therapy session. Pt reluctantly agreeable to therapy, but continually stating that she does not need therapy services. Upon assessment, pt appears to be baseline with mobility. She was able to perform functional mobility without physical assist or use of DME. Thus, pt will be d/c from IP PT services at this time. Pt is safe to return home with no further therapy needs. Please re-consult as needed.     Rehab identified problem list/impairments: Rehab identified problem list/impairments: decreased safety awareness, impaired cardiopulmonary response to activity, gait instability    Rehab potential is good.    Activity tolerance: Good    Discharge recommendations: Discharge Facility/Level Of Care Needs: home     Barriers to discharge: Barriers to Discharge: None    Equipment recommendations: Equipment Needed After Discharge: none     GOALS:    Physical Therapy  Goals     Not on file          Multidisciplinary Problems (Resolved)        Problem: Physical Therapy Goal    Goal Priority Disciplines Outcome Goal Variances Interventions   Physical Therapy Goal   (Resolved)     PT/OT, PT Outcome(s) achieved                     PLAN:    Patient d/c from IP PT as she appears baseline with mobility and has no acute PT needs at this time.   Plan of Care reviewed with: patient        Chloe Calderonard, PT, DPT   8/30/2017  [phone]

## 2017-08-30 NOTE — PT/OT/SLP EVAL
Occupational Therapy  Evaluation / Discharge Summary    Beth Salcedo   MRN: [id number]   Admitting Diagnosis: Acute hypoxemic respiratory failure    OT Date of Treatment: 08/30/17   OT Start Time: 1310  OT Stop Time: 1329  OT Total Time (min): 19 min    Billable Minutes:  Evaluation 19    Diagnosis: Acute hypoxemic respiratory failure       Past Medical History:   Diagnosis Date    Anemia of chronic renal failure, stage 5     Chronic kidney disease     Diabetes mellitus type II     Diastolic heart failure     ESRD on dialysis since 6/10/16     Essential hypertension     Hyperlipidemia     Hypertension     LVH (left ventricular hypertrophy)     Secondary hyperparathyroidism of renal origin       Past Surgical History:   Procedure Laterality Date    ADRENAL GLAND SURGERY  1990s    removed for tumor which she states was benign     HYSTERECTOMY  1990s    removed for tumor which she states was benign     permcath placement         Referring physician: Dr Kapadia  Date referred to OT: 8/29/17    General Precautions: Standard, fall  Orthopedic Precautions: N/A  Braces: N/A    Do you have any cultural, spiritual, Adventism conflicts, given your current situation?: None stated     Patient History:  Living Environment  Lives With: child(samara), adult (Daughter)  Living Arrangements: house (single story home)  Home Accessibility: stairs to enter home  Number of Stairs to Enter Home: 3  Stair Railings at Home: outside, present at both sides  Transportation Available: family or friend will provide  Living Environment Comment: Pt lives with daughter in 1 story home with 3STE and PTA was (I) in all aspects. 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  Pt drives and has tub shower with shower chair.  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  IADL Comments: PTA (I) with IADLs     Dominant hand: right    Subjective:  Communicated with nurse prior to session.    Chief Complaint: Pt just wants to go home.   Insists nothing is wrong with he and she needs no therapy.    Patient/Family stated goals: None  Pain/Comfort  Pain Rating 1: 0/10    Objective:  Patient found with: telemetry, peripheral IV, pulse ox (continuous)    Cognitive Exam:  Oriented to: Person, Place, Time and Situation  Follows Commands/attention: Follows two-step commands  Communication: clear/fluent  Memory:  No Deficits noted  Safety awareness/insight to disability: intact  Coping skills/emotional control: Appropriate to situation    Visual/perceptual:  Intact    Physical Exam:  Postural examination/scapula alignment: No postural abnormalities identified  Skin integrity: Visible skin intact  Edema: None noted (B) UEs    Sensation:   Intact    Upper Extremity Range of Motion:  Right Upper Extremity: WFL  Left Upper Extremity: WFL    Upper Extremity Strength:  Right Upper Extremity: WFL  Left Upper Extremity: WFL   Strength: WFLS    Fine motor coordination:   Intact    Gross motor coordination: WFL    Functional Mobility:  Bed Mobility:  Supine to Sit:  (Not observed but Pt states that she needs no help.)    Transfers:  Sit <> Stand Assistance: Modified Independent  Sit <> Stand Assistive Device: No Assistive Device  Bed <> Chair Technique: Stand Pivot  Bed <> Chair Transfer Assistance: Modified Independent  Bed <> Chair Assistive Device: No Assistive Device    Functional Ambulation: See PT note for ambulatory distance and details.    Activities of Daily Living:  Feeding Level of Assistance: Activity did not occur    UE  "Dressing Level of Assistance: Set-up Assistance (with no assist after set up.)    LE Dressing Level of Assistance: Set-up Assistance (with no assist after set up. )       Grooming Level of Assistance:  (No assist after set up.)     Balance:   Static Sit: GOOD+: Takes MAXIMAL challenges from all directions.    Dynamic Sit: GOOD+: Maintains balance through MAXIMAL excursions of active trunk motion  Static Stand: GOOD: Takes MODERATE challenges from all directions  Dynamic stand: GOOD-: Needs SUPERVISION only during gait and able to self right with moderate     AM-PAC 6 CLICK ADL  How much help from another person does this patient currently need?  1 = Unable, Total/Dependent Assistance  2 = A lot, Maximum/Moderate Assistance  3 = A little, Minimum/Contact Guard/Supervision  4 = None, Modified [place]/Independent    Putting on and taking off regular lower body clothing? : 4  Bathing (including washing, rinsing, drying)?: 3  Toileting, which includes using toilet, bedpan, or urinal? : 4  Putting on and taking off regular upper body clothing?: 4  Taking care of personal grooming such as brushing teeth?: 4  Eating meals?: 4  Total Score: 23    AM-PAC Raw Score CMS "G-Code Modifier Level of Impairment Assistance   6 % Total / Unable   7 - 9 CM 80 - 100% Maximal Assist   10-14 CL 60 - 80% Moderate Assist   15 - 19 CK 40 - 60% Moderate Assist   20 - 22 CJ 20 - 40% Minimal Assist   23 CI 1-20% SBA / CGA   24 CH 0% Independent/ Mod I       Patient left up in chair with all lines intact, call button in reach, nurse notified and nurse present    Assessment:  Beth Salcedo is a 72 y.o. female with a medical diagnosis of Acute hypoxemic respiratory failure .   is currently performing ADLs, functional mobility & t/fs without assistance and displays age-appropriate strength, endurance & balance. OT services are not recommended at this time and patient is safe to D/C home. Pt has good family " support.    Pt presented with a low complexity OT evaluation.  Pt required a brief an expanded review of medical history and occupational profile.  Pt demod 1-3 performance deficits (physical, cognitive, or psychosocial) resulting in limitations and engagement restrictions.  Clinical decision making required low analytical complexity with limited treatment options.  Pt with no cormorbidities and required no modification of task/assistance with assessment.      Physical- skills refer to impairments of body structure or functions, balance, mobility; strength, endurance, FMC, GMC, sensation, dexterity, and posture.  Cognitive- skills refer to ability to attend, communicate, perceive, think, understand, problem solve, mentally sequence, learn, and remember resulting in ability to organize occupational performance in timely and safe manner.    Psychosocial- skills refer to interpersonal interactions, habits, routines, and behaviors, active use of coping strategies, and environmental adaptations to appropriately participate in everyday tasks and situations.     Rehab identified problem list/impairments: Rehab identified problem list/impairments: impaired cardiopulmonary response to activity    Activity tolerance: Good    Discharge recommendations: Discharge Facility/Level Of Care Needs: home     Barriers to discharge: Barriers to Discharge: None    Equipment recommendations: none     GOALS:    Occupational Therapy Goals     Not on file          Multidisciplinary Problems (Resolved)        Problem: Occupational Therapy Goal    Goal Priority Disciplines Outcome Interventions   Occupational Therapy Goal   (Resolved)     OT, PT/OT Outcome(s) achieved    Description:  is currently performing ADLs, functional mobility & t/fs without assistance and displays age-appropriate strength, endurance & balance. OT services are not recommended at this time and patient is safe to D/C home. Pt has good family support.                     PLAN:   (Pt with no current OT needs.)  Plan of Care expires: 08/30/17  Plan of Care reviewed with: patient         Jayy Parson, OTR/L  08/30/2017

## 2017-08-30 NOTE — PLAN OF CARE
Problem: Patient Care Overview  Goal: Plan of Care Review  Outcome: Ongoing (interventions implemented as appropriate)  HD 4 hours, 2.9 Liters. Patient experienced some calf cramping. Tx completed with NS rinseback, de-accessed and pressure held until hemostasis achieved.

## 2017-08-30 NOTE — PROGRESS NOTES
Pt returned from dialysis. VS stable. No complaints of pain or signs of distress. AAOx4. Siderails upx2, bed in lowest position, call bell within reach. Will continue to monitor.

## 2017-08-30 NOTE — PROGRESS NOTES
[facility] H[facility]ODIAL[facility] NOTE     Patient currently on hemodialysis for removal of uremic toxins and volume.     Patient seen and evaluated on hemodialysis, tolerating treatment, see HD flowsheet for vitals and assessments.      No chest pain, shortness of breath, nausea or vomiting.  Cramping after 3 L net UF removed.       Recent Labs  Lab 08/27/17  0503 08/28/17  0506 08/29/17  0416   WBC 6.96 7.15 7.06   HGB 10.2* 10.1* 10.4*   HCT 32.3* 31.6* 31.4*    262 264       Recent Labs  Lab 08/28/17  0506 08/29/17  0416 08/30/17  0442    134* 135*   K 4.6 4.9 5.1   CL 95 95 104   CO2 26 27 19*   BUN 48* 75* 47*   CREATININE 6.2* 7.6* 5.2*   * 133* 118*   CALCIUM 8.5* 8.5* 8.9   MG 2.1 2.2 2.3   PHOS 5.3* 5.8* 4.7*       Recent Labs  Lab 08/26/17  1517   ALKPHOS 132   ALT 49*   AST 24   ALBUMIN 3.6   PROT 8.0   BILITOT 0.7      Lab Results   Component Value Date    PTH 89.0 (H) 06/13/2016    CALCIUM 8.9 08/30/2017    CAION 0.88 (L) 03/17/2016    PHOS 4.7 (H) 08/30/2017       Exam  AAOx 3  Lungs diminished, poor air exchange  Heart S1S2  Extremities  No edema;  TODD on  Access Left thigh AVG     ASSESSMENT/PLAN:  ESRD on IHD   -Patient admitted for hypoxia found on CT chest mod sized bilateral pleural effusions and pulmonary nodules, R>L. Pulmonary consulted-no thoracenteses at this time.    -8/27 2 D echo normal EF, triv pericardial effusion, and pulmonary htn (PASP 80.44 similar to one year ago).   -HD secondary to htn/DM since 6/2016 on TTS 4 hrs duration [facility] Nancy Parekh. EDW 61 kg   - Will provide dialysis for metabolic clearance and volume   - Target UF 3 L today due to cramping. UF 2.7 L on admission 8/27.  3 L on 8/29.   - Dialysate adjusted to current labs   - HD tomorrow try fo 3-4 L.       Anemia of Chronic Kidney Disease   - Hgb at goal. [id number] mcg IVP every 2 weeks.. Last dose 8/21.       Mineral Bone Disease in CKD   -  Continue Renvela and calcitriol. Renal diet.        Mariella Viera, RAJAN  Nephrology     Patient seen and examined with RAJAN Viera;   I have reviewed and agree with assessment and plan

## 2017-08-30 NOTE — PLAN OF CARE
Problem: Patient Care Overview  Goal: Plan of Care Review  Outcome: Ongoing (interventions implemented as appropriate)  Pt. Remains free from falls/ injury/trauma. No complaints of CP, SOB, or discomfort. HD today, 3L of fluid removed. Daily HD until patient is weaned off O2. Plan of Care reviewed with patient. VSS and NADN. Will continue to monitor.

## 2017-08-30 NOTE — NURSING
Patient arrived to [address] per escort via stretcher, ambulated to scale with assist. Patient irritable, blunted affect, delusions of persecution. Active listening and reassurance provided.

## 2017-08-30 NOTE — PROGRESS NOTES
Progress Note  Hospital Medicine      Admit Date: 8/26/2017    SUBJECTIVE:     Follow-up For:  Acute hypoxemic respiratory failure    HPI/Interval history: No new complaints.     Review of Systems: Gen: no fever, no chills, Heart: no chest pain, palpitations; Resp: no SOB, no cough    OBJECTIVE:     Vital Signs Range (Last 24H):  Temp:  [97.6 °F (36.4 °C)-98.4 °F (36.9 °C)]   Pulse:  [45-55]   Resp:  [18-20]   BP: ()/(31-92)   SpO2:  [94 %-99 %]     Physical Exam:  General appearance: NAD, conversant  Neck: FROM, supple   Lungs: crackles on right base to mid-lung field, no accessory muscle use  CV: RRR, no heave  Abdomen: Soft, non-tender; no masses or HSM  Extremities: No peripheral edema or digital cyanosis  Skin: no rash, lesions or ulcers  Psych: Alert and oriented to person, place and time      Recent Labs  Lab 08/28/17  0506 08/29/17  0416 08/30/17  0442    134* 135*   K 4.6 4.9 5.1   CL 95 95 104   CO2 26 27 19*   BUN 48* 75* 47*   CREATININE 6.2* 7.6* 5.2*   * 133* 118*   CALCIUM 8.5* 8.5* 8.9   MG 2.1 2.2 2.3   PHOS 5.3* 5.8* 4.7*       Recent Labs  Lab 08/26/17  1517   ALKPHOS 132   ALT 49*   AST 24   ALBUMIN 3.6   PROT 8.0   BILITOT 0.7         Recent Labs  Lab 08/27/17  0503 08/28/17  0506 08/29/17  0416   WBC 6.96 7.15 7.06   HGB 10.2* 10.1* 10.4*   HCT 32.3* 31.6* 31.4*    262 264   GRAN 73.3*  5.1 67.7  4.8 68.0  4.8   LYMPH 13.9*  1.0 14.8*  1.1 18.0  1.3   MONO 8.5  0.6 11.0  0.8 9.5  0.7         Recent Labs  Lab 08/29/17  1157 08/30/17  0833   POCTGLUCOSE 112* 103       ASSESSMENT/PLAN:   Acute hypoxic respiratory failure  Bilateral pleural effusion  Pulmonary HTN (PASP 80)   Hypervolemia  ESRF on HD  Nephrology consulted for dialysis  Due to well-known history of non-compliance and non-compliance with allow effective dialysis due to cramping and malaise.   Daily dialysis until off oxygen.   Daily ambulatory oxygen saturations.  Fluid restriction of 800 mL -  strict I/Os   and 138 on admission.   Will hold clonidine and carvedilol AM doses to allow for volume removal without concern for dialysis associated hypotension  Acapella QID  Pulmonary consulted - no need for thoracentesis as previously done 6/2016 was consistent with transudate. Fluid should be removed with dialysis.     dialysis associated cramping  Vitamin E 400 units and vitamin C 500 mg qhs  Acetaminophen 500 mg prior to dialysis  Capsaicin ointment prn cramping   Hold AM doses of clonidine and carvedilol to decrease risk of dialysis associated hypotension   -Cramping with dialysis associated with hypotension. Consider holding longer acting doses of antihypertensives like amlodipine and losartan during stay.  Emphasize fluid restriction compliance to reduce interdialytic weight gains    Pulmonary nodules  Likely due to chronic pulmonary edema. Fluid removal as above. Need repeat CT scan in 3-6 months.     Mild Right sided heart failure  Moderate tricuspid regurgitation  biatrial enlargement  BNP >5000 whereas previous value in 100s  Volume removal as above.     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 Continue losartan 100 mg daily, clonidine 0.1 mg TID and carvedilol 25 mg BID. Holding AM doses of clondine and carvedilol

## 2017-08-30 NOTE — PROGRESS NOTES
During rounds, Pt PIV was found dislodge. Informed the patient on the importance of having IV access in case of an emergency. Pt refused to have another IV placed even after suggesting an 10 + year experience Charge Nurse can insert the PIV. MD Zabala notified and order to retry IV placement in the am. Will continue to monitor.

## 2017-08-30 NOTE — PLAN OF CARE
Problem: Physical Therapy Goal  Goal: Physical Therapy Goal  Outcome: Outcome(s) achieved Date Met: 08/30/17  PT evaluation complete. No goals established as pt is baseline with mobility and has no acute PT needs at this time. D/C from PT services.    Chloe Ly, PT, DPT   8/30/2017  [phone]

## 2017-08-31 LAB
ANION GAP SERPL CALC-SCNC: 13 MMOL/L
BASOPHILS # BLD AUTO: 0.05 K/UL
BASOPHILS NFR BLD: 0.8 %
BUN SERPL-MCNC: 40 MG/DL
CALCIUM SERPL-MCNC: 8.9 MG/DL
CHLORIDE SERPL-SCNC: 99 MMOL/L
CO2 SERPL-SCNC: 24 MMOL/L
CREAT SERPL-MCNC: 5.1 MG/DL
DIFFERENTIAL METHOD: ABNORMAL
EOSINOPHIL # BLD AUTO: 0.2 K/UL
EOSINOPHIL NFR BLD: 3.5 %
ERYTHROCYTE [DISTWIDTH] IN BLOOD BY AUTOMATED COUNT: 18.5 %
EST. GFR  (AFRICAN AMERICAN): 8.9 ML/MIN/1.73 M^2
EST. GFR  (NON AFRICAN AMERICAN): 7.8 ML/MIN/1.73 M^2
GLUCOSE SERPL-MCNC: 105 MG/DL
HCT VFR BLD AUTO: 32.9 %
HGB BLD-MCNC: 10.8 G/DL
LYMPHOCYTES # BLD AUTO: 1.2 K/UL
LYMPHOCYTES NFR BLD: 17.9 %
MAGNESIUM SERPL-MCNC: 1.9 MG/DL
MCH RBC QN AUTO: 30.4 PG
MCHC RBC AUTO-ENTMCNC: 32.8 G/DL
MCV RBC AUTO: 93 FL
MONOCYTES # BLD AUTO: 0.8 K/UL
MONOCYTES NFR BLD: 11.6 %
NEUTROPHILS # BLD AUTO: 4.4 K/UL
NEUTROPHILS NFR BLD: 66.2 %
PHOSPHATE SERPL-MCNC: 4.2 MG/DL
PLATELET # BLD AUTO: 188 K/UL
PMV BLD AUTO: 13.1 FL
POCT GLUCOSE: 100 MG/DL (ref 70–110)
POTASSIUM SERPL-SCNC: 3.7 MMOL/L
RBC # BLD AUTO: 3.55 M/UL
SODIUM SERPL-SCNC: 136 MMOL/L
WBC # BLD AUTO: 6.65 K/UL

## 2017-08-31 PROCEDURE — 27000221 HC OXYGEN, UP TO 24 HOURS

## 2017-08-31 PROCEDURE — 63600175 PHARM REV CODE 636 W HCPCS: Performed by: INTERNAL MEDICINE

## 2017-08-31 PROCEDURE — 84100 ASSAY OF PHOSPHORUS: CPT

## 2017-08-31 PROCEDURE — 80100016 HC MAINTENANCE HEMODIALYSIS

## 2017-08-31 PROCEDURE — 25000003 PHARM REV CODE 250: Performed by: HOSPITALIST

## 2017-08-31 PROCEDURE — 94761 N-INVAS EAR/PLS OXIMETRY MLT: CPT

## 2017-08-31 PROCEDURE — 90935 HEMODIALYSIS ONE EVALUATION: CPT | Mod: ,,, | Performed by: NURSE PRACTITIONER

## 2017-08-31 PROCEDURE — 99232 SBSQ HOSP IP/OBS MODERATE 35: CPT | Mod: ,,, | Performed by: HOSPITALIST

## 2017-08-31 PROCEDURE — 96372 THER/PROPH/DIAG INJ SC/IM: CPT

## 2017-08-31 PROCEDURE — 25000003 PHARM REV CODE 250: Performed by: INTERNAL MEDICINE

## 2017-08-31 PROCEDURE — 27000173 HC ACAPELLA DEVICE DH OR DM

## 2017-08-31 PROCEDURE — 20600001 HC STEP DOWN PRIVATE ROOM

## 2017-08-31 PROCEDURE — 85025 COMPLETE CBC W/AUTO DIFF WBC: CPT

## 2017-08-31 PROCEDURE — 94664 DEMO&/EVAL PT USE INHALER: CPT

## 2017-08-31 PROCEDURE — 83735 ASSAY OF MAGNESIUM: CPT

## 2017-08-31 PROCEDURE — 80048 BASIC METABOLIC PNL TOTAL CA: CPT

## 2017-08-31 PROCEDURE — 90935 HEMODIALYSIS ONE EVALUATION: CPT | Mod: ,,, | Performed by: INTERNAL MEDICINE

## 2017-08-31 PROCEDURE — 36415 COLL VENOUS BLD VENIPUNCTURE: CPT

## 2017-08-31 PROCEDURE — 25000003 PHARM REV CODE 250: Performed by: NURSE PRACTITIONER

## 2017-08-31 RX ORDER — PARICALCITOL 5 UG/ML
12 INJECTION, SOLUTION INTRAVENOUS
Status: DISCONTINUED | OUTPATIENT
Start: 2017-08-31 | End: 2017-09-02 | Stop reason: HOSPADM

## 2017-08-31 RX ORDER — SODIUM CHLORIDE 9 MG/ML
INJECTION, SOLUTION INTRAVENOUS ONCE
Status: COMPLETED | OUTPATIENT
Start: 2017-08-31 | End: 2017-09-02

## 2017-08-31 RX ORDER — PARICALCITOL 5 UG/ML
12 INJECTION, SOLUTION INTRAVENOUS
Status: DISCONTINUED | OUTPATIENT
Start: 2017-08-31 | End: 2017-08-31 | Stop reason: SDUPTHER

## 2017-08-31 RX ORDER — SODIUM CHLORIDE 9 MG/ML
INJECTION, SOLUTION INTRAVENOUS
Status: DISCONTINUED | OUTPATIENT
Start: 2017-08-31 | End: 2017-09-02 | Stop reason: HOSPADM

## 2017-08-31 RX ADMIN — VITAMIN E CAP 400 UNIT 400 UNITS: 400 CAP at 08:08

## 2017-08-31 RX ADMIN — STANDARDIZED SENNA CONCENTRATE AND DOCUSATE SODIUM 1 TABLET: 8.6; 5 TABLET, FILM COATED ORAL at 08:08

## 2017-08-31 RX ADMIN — CLONIDINE HYDROCHLORIDE 0.1 MG: 0.1 TABLET ORAL at 08:08

## 2017-08-31 RX ADMIN — HEPARIN SODIUM 5000 UNITS: 5000 INJECTION, SOLUTION INTRAVENOUS; SUBCUTANEOUS at 02:08

## 2017-08-31 RX ADMIN — SEVELAMER CARBONATE 1600 MG: 800 TABLET, FILM COATED ORAL at 06:08

## 2017-08-31 RX ADMIN — SEVELAMER CARBONATE 1600 MG: 800 TABLET, FILM COATED ORAL at 02:08

## 2017-08-31 RX ADMIN — ACETAMINOPHEN 500 MG: 500 TABLET ORAL at 05:08

## 2017-08-31 RX ADMIN — PARICALCITOL 12 MCG: 5 INJECTION INTRAVENOUS at 01:08

## 2017-08-31 RX ADMIN — HEPARIN SODIUM 5000 UNITS: 5000 INJECTION, SOLUTION INTRAVENOUS; SUBCUTANEOUS at 05:08

## 2017-08-31 RX ADMIN — HEPARIN SODIUM 5000 UNITS: 5000 INJECTION, SOLUTION INTRAVENOUS; SUBCUTANEOUS at 08:08

## 2017-08-31 RX ADMIN — OXYCODONE HYDROCHLORIDE AND ACETAMINOPHEN 500 MG: 500 TABLET ORAL at 08:08

## 2017-08-31 RX ADMIN — AMLODIPINE BESYLATE 10 MG: 10 TABLET ORAL at 02:08

## 2017-08-31 RX ADMIN — CLONIDINE HYDROCHLORIDE 0.1 MG: 0.1 TABLET ORAL at 02:08

## 2017-08-31 RX ADMIN — CARVEDILOL 25 MG: 25 TABLET, FILM COATED ORAL at 08:08

## 2017-08-31 RX ADMIN — SODIUM CHLORIDE 1000 ML: 0.9 INJECTION, SOLUTION INTRAVENOUS at 08:08

## 2017-08-31 RX ADMIN — LOSARTAN POTASSIUM 100 MG: 50 TABLET, FILM COATED ORAL at 02:08

## 2017-08-31 NOTE — PLAN OF CARE
Problem: Patient Care Overview  Goal: Individualization & Mutuality  Plan of care discussed with patient. Patient is free of fall/trauma/injury. Denies CP, SOB, or pain/discomfort. Dialysis completed today. 3L removed. VS stable. AAOx4. All questions addressed. Will continue to monitor

## 2017-08-31 NOTE — PLAN OF CARE
Problem: Patient Care Overview  Goal: Plan of Care Review  Outcome: Ongoing (interventions implemented as appropriate)  Pt. Remains free from falls/ injury/trauma. No complaints of CP, SOB, or discomfort. HD today, 2.9 L removed. Daily HD until patient is able to be weaned off oxygen. Pt currently refusing IV placement. MD aware. Plan of Care reviewed with patient. VSS and NADN. Will continue to monitor.

## 2017-08-31 NOTE — PROGRESS NOTES
Dialysis complete.  Blood returned.   Hemostasis complete.   Held pressure to  Left thigh   A/v site for 10   Minutes to each site.   Guaze and tape applied to needle site.   No active bleeding noted.   + thrill and bruit.    Pt tolerated hemodialysis without diff.   Pt ran  4  Hrs on hemodialysis machine.   Took off     3000 Net volume.   Used   [id number]    Dialyzer.

## 2017-08-31 NOTE — PROGRESS NOTES
Pt arrived via stretcher.  Placed on cardiac monitor and b/p check every 15 minutes. Left thigh Dialysis access prep with prevantic swab.  maintenace  Hemodialysis started per orders.

## 2017-08-31 NOTE — PROGRESS NOTES
[facility] HEMODIALYSIS NOTE     Patient currently on hemodialysis for removal of uremic toxins and volume     Patient seen and evaluated on hemodialysis, tolerating treatment, see HD flowsheet for vitals and assessments.      No hypotension chest pain, shortness of breath, nausea or vomiting.        Recent Labs  Lab 08/28/17  0506 08/29/17  0416 08/31/17  0439   WBC 7.15 7.06 6.65   HGB 10.1* 10.4* 10.8*   HCT 31.6* 31.4* 32.9*    264 188       Recent Labs  Lab 08/29/17  0416 08/30/17  0442 08/31/17  0439   * 135* 136   K 4.9 5.1 3.7   CL 95 104 99   CO2 27 19* 24   BUN 75* 47* 40*   CREATININE 7.6* 5.2* 5.1*   * 118* 105   CALCIUM 8.5* 8.9 8.9   MG 2.2 2.3 1.9   PHOS 5.8* 4.7* 4.2       Recent Labs  Lab 08/26/17  1517   ALKPHOS 132   ALT 49*   AST 24   ALBUMIN 3.6   PROT 8.0   BILITOT 0.7      Lab Results   Component Value Date    PTH 89.0 (H) 06/13/2016    CALCIUM 8.9 08/31/2017    CAION 0.88 (L) 03/17/2016    PHOS 4.2 08/31/2017       Exam  AAOx 3  Lungs diminished mid to lower lobes  Heart S1S2  Extremities  No edema;  TODD on  Access Left thigh AVG no issues     ASSESSMENT/PLAN:  ESRD on IHD TTS  -Patient admitted for hypoxia found on CT chest mod sized bilateral pleural effusions and pulmonary nodules, R>L. Pulmonary consulted-no thoracenteses at this time.    -8/27 2 D echo normal EF, triv pericardial effusion, and pulmonary htn (PASP 80.44 similar to one year ago).   -HD secondary to htn/DM since 6/2016 on TTS 4 hrs duration [facility] Nancy Parekh. EDW 61 kg .   - Will provide dialysis for metabolic clearance and volume. Gained 1 kg from yesterday.   - Target UF 4 L UF 2.7 L 8/27,  3 L on 8/29 8/30. 10-13ml/kg/hr to see if help with cramping 600-780 cc/hr.   - Recommend repeat CXR and O2 sat RA activity after dialysis.      Anemia of Chronic Kidney Disease   - Hgb at goal. [id number] mcg IVP every 2 week. Last dose 8/21.       Mineral Bone Disease in CKD   -  Continue  Renvela . Stop calcitriol as receiving Hectorol 5 mcg IV outpt. Zemplar while inpatient. Renal diet.       Mariella Viera NP  Nephrology       ATTENDING PHYSICIAN ATTESTATION  I have personally interviewed and examined the patient. I thoroughly reviewed the demographic, clinical, laboratorial and imaging information available in medical records. I agree with the assessment and recommendations provided by RAJAN Viera was under my supervision.     HEMODIALYSIS NOTE  Patient evaluated while undergoing hemodialysis indicated for ESRD. Tolerating session with current UFR, no complications.

## 2017-08-31 NOTE — PROGRESS NOTES
Trialed pt on RA(per MD order), O2 sat dropped to 88. Placed pt back on 1 L NC. O2 97%. Will continue to monitor pt.

## 2017-08-31 NOTE — PROGRESS NOTES
Left for Dialysis. Pt AAOx4. VS stable. No complaints of pain or signs of distress. Left per stretcher with transport.

## 2017-08-31 NOTE — PROGRESS NOTES
"Progress Note  Hospital Medicine    Admit Date: 8/26/2017  Length of Stay:  LOS: 5 days     SUBJECTIVE:         Follow-up For:  Acute hypoxemic respiratory failure    HPI/Interval history (See H&P for complete P,F,SHx) :     08/27/17  CT chest - Moderate sized bilateral pleural effusions, right greater than left with associated atelectatic change and bandlike opacities, most prominent within the right lower lobe. s/p pulmonology evaluation.Prior pleural tap 06/16- transudative. likely from worsenign diastolic CHF, no plan for thoracentesis. on 3L oxygen via nasal canula     08/28/17  S/p HD yesterday and ultrafiltration 0f 2.7L . patient does not want to take hydralazine as it gave her "pounding in the chest" -she discontinued on her own. mentions she has "whitecoat hypertension" . blood pressure controlled this AM with SBP 130s    08/31/17  s/p Ultrafiltration  2.7 L 8/27,  3 L on 8/29 8/30 and 08/31. oxygen tapered to 1L nasal canula. Chest X ray and O2 saturation with RA activity after dialysis today      Review of Systems: List if applicable  Pain scale: 010  Constitutional- Positive for Weakness  Resp- Positive for  SOB -improved       OBJECTIVE:     Vital Signs Range (Last 24H):  Temp:  [97.5 °F (36.4 °C)-99.1 °F (37.3 °C)]   Pulse:  [45-87]   Resp:  [18]   BP: (112-163)/(38-81)   SpO2:  [96 %-99 %]     Physical Exam:  General- Patient alert and oriented x3   HEENT- PERRLA, EOMI, OP clear  Neck- No JVD, Lymphadenopathy, Thyromegaly  CV- Regular rate and rhythm, No Murmur/christine/rubs  Resp- Lungs CTA Bilaterally, No increased WOB  Abdomen- Non tender/non-distended, BS normoactive x4 quads, no HSM  Extrem- No cyanosis, clubbing, edema. Left thigh AV graft   Skin- No rashes, lesions, ulcers  Neuro- Strength 5/5 flexors/extensors,Intact sensation to light touch grossly      Medications:  Medication list was reviewed and changes noted under Assessment/Plan.      Current Facility-Administered Medications:     0.9%  " NaCl infusion, , Intravenous, PRN, Mariella Viera NP    0.9%  NaCl infusion, , Intravenous, PRN, Mariella Viera NP, 1,000 mL at 08/31/17 0845    acetaminophen tablet 500 mg, 500 mg, Oral, Before breakfast, Susan Kapadia MD, 500 mg at 08/31/17 0509    acetaminophen tablet 650 mg, 650 mg, Oral, Q6H PRN, Susan Kapadia MD    amlodipine tablet 10 mg, 10 mg, Oral, After lunch, Susan Kapadia MD, 10 mg at 08/30/17 1314    ascorbic acid (vitamin C) tablet 500 mg, 500 mg, Oral, QHS, Susan Kapadia MD, 500 mg at 08/30/17 2107    aspirin chewable tablet 81 mg, 81 mg, Oral, Daily, PAM Jung MD, 81 mg at 08/30/17 1326    capsaicin 0.025 % cream, , Topical, Daily PRN, Susan Kapadia MD    carvedilol tablet 25 mg, 25 mg, Oral, QHS, Susan Kapadia MD, 25 mg at 08/30/17 2106    cloNIDine tablet 0.1 mg, 0.1 mg, Oral, QHS, Susan Kapadia MD, 0.1 mg at 08/30/17 2106    cloNIDine tablet 0.1 mg, 0.1 mg, Oral, Daily with lunch, Susan Kapadia MD, 0.1 mg at 08/30/17 1316    dextrose 50% injection 25 g, 25 g, Intravenous, PRN, PAM Jung MD    ergocalciferol capsule 50,000 Units, 50,000 Units, Oral, Q7 Days, PAM Jung MD, 50,000 Units at 08/27/17 0801    heparin (porcine) injection 5,000 Units, 5,000 Units, Subcutaneous, Q8H, PAM Jung MD, 5,000 Units at 08/31/17 0505    losartan tablet 100 mg, 100 mg, Oral, After lunch, Susan Kapadia MD    ondansetron disintegrating tablet 8 mg, 8 mg, Oral, Q8H PRN, PAM Jung MD    paricalcitol injection 12 mcg, 12 mcg, Intravenous, Every Tues, Thurs, Sat, Susan Kapadia MD    polyethylene glycol packet 17 g, 17 g, Oral, BID PRN, PAM Jung MD    promethazine tablet 25 mg, 25 mg, Oral, Q6H PRN, PAM Jung MD    ramelteon tablet 8 mg, 8 mg, Oral, Nightly PRN, PAM Jung MD    senna-docusate 8.6-50 mg per tablet 1 tablet, 1 tablet, Oral, BID, PAM Jung MD, 1 tablet at 08/29/17 7000    sevelamer carbonate tablet 1,600  mg, 1,600 mg, Oral, TID WM, Alfred Ernadnez MD, 1,600 mg at 08/30/17 1743    vitamin E capsule 400 Units, 400 Units, Oral, QHS, Susan Kapadia MD, 400 Units at 08/30/17 2106    sodium chloride 0.9%, sodium chloride 0.9%, acetaminophen, capsaicin, dextrose 50%, ondansetron, polyethylene glycol, promethazine, ramelteon    Laboratory/Diagnostic Data:  Reviewed and noted in plan where applicable- Please see chart for full lab data.      Recent Labs  Lab 08/28/17  0506 08/29/17  0416 08/31/17  0439   WBC 7.15 7.06 6.65   HGB 10.1* 10.4* 10.8*   HCT 31.6* 31.4* 32.9*    264 188         Recent Labs  Lab 08/29/17  0416 08/30/17  0442 08/31/17  0439   * 135* 136   K 4.9 5.1 3.7   CL 95 104 99   CO2 27 19* 24   BUN 75* 47* 40*   CREATININE 7.6* 5.2* 5.1*   * 118* 105   CALCIUM 8.5* 8.9 8.9   MG 2.2 2.3 1.9   PHOS 5.8* 4.7* 4.2         Recent Labs  Lab 08/26/17  1517   ALKPHOS 132   ALT 49*   AST 24   ALBUMIN 3.6   PROT 8.0   BILITOT 0.7        Microbiology labs for the last week  Microbiology Results (last 7 days)     ** No results found for the last 168 hours. **           Imaging Results          CT Chest Without Contrast (Final result)     Abnormal  Result time 08/26/17 23:27:55    Final result by Richie Mata MD (08/26/17 23:27:55)                 Impression:        1.  Moderate sized bilateral pleural effusions, right greater than left with associated atelectatic change and bandlike opacities, most prominent within the right lower lobe.    2.  Multiple bilateral pulmonary nodules, the largest of which measures 0.7 x 0.5 cm within the apical segment of the right upper lobe (axial series 2, image 32). Per Fleischner Society 2017 guidelines, in a low or high risk patient: CT chest 3-6 months with followup CT chest in 18-24 months.    3.  Extensive coronary and aortic atherosclerosis.    4. Low-density nodular thickening of the right adrenal gland, favored to represent an adenoma.    5.   Additional findings include calcifications of the mitral and aortic valve, postsurgical changes in the region of the left adrenal gland, degenerative change of the spine, renal atrophy and right renal hypodensities which are too small to characterize.      This report has been flagged in the Epic medical record system.  ______________________________________     Electronically signed by resident: KILEY LEMOS MD  Date:     08/26/17  Time:    23:13            As the supervising and teaching physician, I personally reviewed the images and resident's interpretation and I agree with the findings.          Electronically signed by: Richie Mata  Date:     08/26/17  Time:    23:27              Narrative:    Time of Procedure: 08/26/17 22:20:22  Accession # [id number]    Technique: The chest was surveyed from the lung apices through the costophrenic angles without the use of intravenous contrast material.  Data was reformatted for contiguous 5 mm images in the axial, sagittal, and coronal planes.  Data was post processed for extraction of 1 mm images for effective high-resolution CT imaging without additional radiation to the patient.    Comparison: Chest radiograph 8/26/2017.      Findings:    The structures at the base of the neck reveal no convincing evidence of disease.    There is a left-sided aortic arch with three branch vessels.  The thoracic aorta maintains normal caliber, contour, and course with significant atherosclerotic calcification within its course.      The heart is enlarged and there is no evidence of pericardial effusion.  There is significant radiopaque material within the mitral and aortic valves, which may correlate with extensive calcification versus prosthesis.  There is extensive calcification of the coronary arteries.    The pulmonary arteries distribute normally.  There are four pulmonary veins.  Systemic and pulmonary venoatrial connections are concordant.    The trachea and proximal  airways are patent.    The lungs demonstrate bilateral pleural fluid, greatest on the right with associated atelectatic change.  Additional bandlike opacities are present bilaterally, most prominent within the right lower lobe.  Multiple bilateral pulmonary nodules are identified, the largest on the right measuring 0.7 x 0.5 cm within the apical segment of the right upper lobe (axial series 2, image 32).  The largest on the left measures 0.5 x 0.4 cm within the anterior segment of the left upper lobe (axial series 2, image 37).  No pneumothorax or significant pleural thickening.    There is no axillary or mediastinal lymph node enlargement.  The hilar contours are unremarkable on this non-contrast examination.    The esophagus maintains a normal course and caliber.    Limited views of the abdomen demonstrate significant renal atrophy with left renal hypodensities which are too small to characterize.  Surgical material is seen within the region of the left adrenal gland in this patient with a history of prior adrenal surgery.  Nodular hypodensity of the right adrenal gland measuring at least 1.7 cm.  Although incompletely characterized, the density measures approximately 0 HU (series 2, image 119), consistent with an adenoma.     The osseous structures demonstrate degenerative change of the spine.                             X-Ray Chest 1 View (Final result)  Result time 08/26/17 16:15:11    Final result by Dustin Delacruz MD (08/26/17 16:15:11)                 Impression:     No significant interval change      Electronically signed by: Dr. DUSTIN DELACRUZ MD  Date:     08/26/17  Time:    16:15              Narrative:    Comparison: 12/13/2016    Results: Single view. Right sided central venous catheter has been removed. Cardiac silhouette is unchanged. Atherosclerotic calcification in the wall of the aortic arch. Pulmonary vascularity is prominent but stable. Continued mild blunting of both costophrenic angles with a little  "increased opacity at both lung bases likely representing mild volume loss/consolidation. No pneumothorax.                              Estimated body mass index is 25.83 kg/m² as calculated from the following:    Height as of this encounter: 5' (1.524 m).    Weight as of this encounter: 60 kg (132 lb 4.4 oz).    I & O (Last 24H):    Intake/Output Summary (Last 24 hours) at 08/31/17 1215  Last data filed at 08/31/17 0500   Gross per 24 hour   Intake              420 ml   Output                0 ml   Net              420 ml       Estimated Creatinine Clearance: 7.8 mL/min (based on Cr of 5.1).    ASSESSMENT/PLAN:     Active Problems:    Active Hospital Problems    Diagnosis  POA    *Acute hypoxemic respiratory failure [J96.01]CT chest - Moderate sized bilateral pleural effusions, right greater than left with associated atelectatic change and bandlike opacities, most prominent within the right lower lobe. s/p pulmonology evaluation.Prior pleural tap 06/16- transudative. likely from worsening diastolic CHF, no plan for thoracentesis. on oxygen taper 3L --> 1L via nasal canula.taper as tolerated. May need home oxygen .s/p Ultrafiltration  2.7 L 8/27,  3 L on 8/29 8/30 and 08/31. oxygen tapered to 1L nasal canula. Chest X ray and O2 saturation with RA activity after dialysis today    Yes    Acute on chronic combined systolic and diastolic heart failure [I50.43]BNP 5k. makes little urine. Nephrology consulted . Echo with Normal left ventricular systolic function (EF 60-65%). Right ventricular enlargement with mildly depressed systolic function.     Pulmonary hypertension  - with estimated PA systolic pressure is 80 mmHg.   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 SBP controlled . continue clonidine, losartan , carvedilol. Patient does not want to take hydralazine as it gave her "pounding in the chest" -she discontinued on her own. mentions she has " ""whitecoat hypertension" . blood pressure controlled this AM with SBP 130s  Yes    Pleural effusion [J90]as above   Yes    Pulmonary nodules [R91.8]  Unknown     Multiple bilateral pulmonary nodules, the largest of which measures 0.7 x 0.5 cm within the apical segment of the right upper lobe (axial series 2, image 32). Per Fleischner Society 2017 guidelines, in a low or high risk patient: CT chest 3-6 months with followup CT chest in 18-24 months.      Adrenal adenoma [D35.00]  Unknown     Low-density nodular thickening of the right adrenal gland      SOB (shortness of breath) [R06.02]likley secondary to pleural effusions   Yes    ESRD (end stage renal disease) on dialysis [N18.6, Z99.2]Nephrology consulted for HD. had full session prior to admission  but no fluid removed. HD per nephrology  Not Applicable     Chronic    Anemia of chronic renal failure, stage 5 [N18.5, D63.1]  Hb of 10.8 stable. monitor   Yes     Chronic    Type II diabetes mellitus with renal manifestations [E11.29]hb A1c of 6.  not on medications at home. diet controlled   Yes      Resolved Hospital Problems    Diagnosis Date Resolved POA   No resolved problems to display.         Disposition- Home     DVT prophylaxis addressed with:SQ heparin             Alfred Ernandez MD  Attending Staff Physician  [facility]  pager- [phone] [id number] - [phone]  "

## 2017-09-01 LAB
ANION GAP SERPL CALC-SCNC: 11 MMOL/L
BUN SERPL-MCNC: 32 MG/DL
CALCIUM SERPL-MCNC: 9.5 MG/DL
CHLORIDE SERPL-SCNC: 102 MMOL/L
CO2 SERPL-SCNC: 25 MMOL/L
CREAT SERPL-MCNC: 4.4 MG/DL
EST. GFR  (AFRICAN AMERICAN): 10.7 ML/MIN/1.73 M^2
EST. GFR  (NON AFRICAN AMERICAN): 9.3 ML/MIN/1.73 M^2
GLUCOSE SERPL-MCNC: 110 MG/DL
MAGNESIUM SERPL-MCNC: 2.1 MG/DL
PHOSPHATE SERPL-MCNC: 3.5 MG/DL
POTASSIUM SERPL-SCNC: 4.2 MMOL/L
SODIUM SERPL-SCNC: 138 MMOL/L

## 2017-09-01 PROCEDURE — 63600175 PHARM REV CODE 636 W HCPCS: Performed by: INTERNAL MEDICINE

## 2017-09-01 PROCEDURE — 25000003 PHARM REV CODE 250: Performed by: HOSPITALIST

## 2017-09-01 PROCEDURE — 36415 COLL VENOUS BLD VENIPUNCTURE: CPT

## 2017-09-01 PROCEDURE — 20600001 HC STEP DOWN PRIVATE ROOM

## 2017-09-01 PROCEDURE — 83735 ASSAY OF MAGNESIUM: CPT

## 2017-09-01 PROCEDURE — 25000003 PHARM REV CODE 250: Performed by: INTERNAL MEDICINE

## 2017-09-01 PROCEDURE — 80048 BASIC METABOLIC PNL TOTAL CA: CPT

## 2017-09-01 PROCEDURE — 99232 SBSQ HOSP IP/OBS MODERATE 35: CPT | Mod: ,,, | Performed by: HOSPITALIST

## 2017-09-01 PROCEDURE — 27000221 HC OXYGEN, UP TO 24 HOURS

## 2017-09-01 PROCEDURE — 27000173 HC ACAPELLA DEVICE DH OR DM

## 2017-09-01 PROCEDURE — 84100 ASSAY OF PHOSPHORUS: CPT

## 2017-09-01 RX ADMIN — SEVELAMER CARBONATE 1600 MG: 800 TABLET, FILM COATED ORAL at 11:09

## 2017-09-01 RX ADMIN — VITAMIN E CAP 400 UNIT 400 UNITS: 400 CAP at 07:09

## 2017-09-01 RX ADMIN — ACETAMINOPHEN 500 MG: 500 TABLET ORAL at 05:09

## 2017-09-01 RX ADMIN — HEPARIN SODIUM 5000 UNITS: 5000 INJECTION, SOLUTION INTRAVENOUS; SUBCUTANEOUS at 01:09

## 2017-09-01 RX ADMIN — HEPARIN SODIUM 5000 UNITS: 5000 INJECTION, SOLUTION INTRAVENOUS; SUBCUTANEOUS at 05:09

## 2017-09-01 RX ADMIN — SEVELAMER CARBONATE 1600 MG: 800 TABLET, FILM COATED ORAL at 05:09

## 2017-09-01 RX ADMIN — AMLODIPINE BESYLATE 10 MG: 10 TABLET ORAL at 11:09

## 2017-09-01 RX ADMIN — LOSARTAN POTASSIUM 100 MG: 50 TABLET, FILM COATED ORAL at 11:09

## 2017-09-01 RX ADMIN — CLONIDINE HYDROCHLORIDE 0.1 MG: 0.1 TABLET ORAL at 07:09

## 2017-09-01 RX ADMIN — CARVEDILOL 25 MG: 25 TABLET, FILM COATED ORAL at 07:09

## 2017-09-01 RX ADMIN — OXYCODONE HYDROCHLORIDE AND ACETAMINOPHEN 500 MG: 500 TABLET ORAL at 07:09

## 2017-09-01 RX ADMIN — HEPARIN SODIUM 5000 UNITS: 5000 INJECTION, SOLUTION INTRAVENOUS; SUBCUTANEOUS at 07:09

## 2017-09-01 RX ADMIN — CLONIDINE HYDROCHLORIDE 0.1 MG: 0.1 TABLET ORAL at 11:09

## 2017-09-01 RX ADMIN — SEVELAMER CARBONATE 1600 MG: 800 TABLET, FILM COATED ORAL at 08:09

## 2017-09-01 NOTE — PLAN OF CARE
Problem: Patient Care Overview  Goal: Plan of Care Review  Plan of care discussed with patient. Patient is free of fall/trauma/injury. Denies CP, SOB, or pain/discomfort. Dialysis planned for today or tomorrow am. Pt is on 1 liter Nasal cannula. Will try to wean. All questions addressed. Will continue to monitor

## 2017-09-01 NOTE — PROGRESS NOTES
"Progress Note  Hospital Medicine    Admit Date: 8/26/2017  Length of Stay:  LOS: 6 days     SUBJECTIVE:         Follow-up For:  Acute hypoxemic respiratory failure    HPI/Interval history (See H&P for complete P,F,SHx) :     08/27/17  CT chest - Moderate sized bilateral pleural effusions, right greater than left with associated atelectatic change and bandlike opacities, most prominent within the right lower lobe. s/p pulmonology evaluation.Prior pleural tap 06/16- transudative. likely from worsenign diastolic CHF, no plan for thoracentesis. on 3L oxygen via nasal canula     08/28/17  S/p HD yesterday and ultrafiltration 0f 2.7L . patient does not want to take hydralazine as it gave her "pounding in the chest" -she discontinued on her own. mentions she has "whitecoat hypertension" . blood pressure controlled this AM with SBP 130s    08/31/17  s/p Ultrafiltration  2.7 L 8/27,  3 L on 8/29 8/30 and 08/31. oxygen tapered to 1L nasal canula. Chest X ray and O2 saturation with RA activity after dialysis today    09/1/17  O2 sat dropped to 88 room air. currenlty on 1 L NC.  Possible HD today or in AM. CX ray - cardiomegaly, aortic plaque, bibasal edema and pleural fluid right greater than left      Review of Systems: List if applicable  Pain scale: 010  Constitutional- Positive for Weakness  Resp- Positive for  SOB -improved       OBJECTIVE:     Vital Signs Range (Last 24H):  Temp:  [97.6 °F (36.4 °C)-98.9 °F (37.2 °C)]   Pulse:  [51-67]   Resp:  [16-18]   BP: (119-152)/(42-86)   SpO2:  [93 %-100 %]     Physical Exam:  General- Patient alert and oriented x3   HEENT- PERRLA, EOMI, OP clear  Neck- No JVD, Lymphadenopathy, Thyromegaly  CV- Regular rate and rhythm, No Murmur/christine/rubs  Resp- Lungs CTA Bilaterally, No increased WOB  Abdomen- Non tender/non-distended, BS normoactive x4 quads, no HSM  Extrem- No cyanosis, clubbing, edema. Left thigh AV graft   Skin- No rashes, lesions, ulcers  Neuro- Strength 5/5 " flexors/extensors,Intact sensation to light touch grossly      Medications:  Medication list was reviewed and changes noted under Assessment/Plan.      Current Facility-Administered Medications:     0.9%  NaCl infusion, , Intravenous, PRN, Mariella Viera NP    0.9%  NaCl infusion, , Intravenous, PRN, Mariella Viera NP, 1,000 mL at 08/31/17 0845    0.9%  NaCl infusion, , Intravenous, PRN, Mariella Viera NP    0.9%  NaCl infusion, , Intravenous, Once, Mariella Viera NP, Stopped at 08/31/17 2315    acetaminophen tablet 500 mg, 500 mg, Oral, Before breakfast, Susan Kapadia MD, 500 mg at 09/01/17 0543    acetaminophen tablet 650 mg, 650 mg, Oral, Q6H PRN, Susan Kapadia MD    amlodipine tablet 10 mg, 10 mg, Oral, After lunch, Susan Kapadia MD, 10 mg at 08/31/17 1424    ascorbic acid (vitamin C) tablet 500 mg, 500 mg, Oral, QHS, Susan Kapadia MD, 500 mg at 08/31/17 2014    aspirin chewable tablet 81 mg, 81 mg, Oral, Daily, PAM Jung MD, 81 mg at 08/30/17 1326    capsaicin 0.025 % cream, , Topical, Daily PRN, Susan Kapadia MD    carvedilol tablet 25 mg, 25 mg, Oral, QHS, Susan Kapadia MD, 25 mg at 08/31/17 2014    cloNIDine tablet 0.1 mg, 0.1 mg, Oral, QHS, Susan Kapadia MD, 0.1 mg at 08/31/17 2014    cloNIDine tablet 0.1 mg, 0.1 mg, Oral, Daily with lunch, Susan Kapadia MD, 0.1 mg at 08/31/17 1425    dextrose 50% injection 25 g, 25 g, Intravenous, PRN, PAM Jung MD    ergocalciferol capsule 50,000 Units, 50,000 Units, Oral, Q7 Days, PAM Jung MD, 50,000 Units at 08/27/17 0801    heparin (porcine) injection 5,000 Units, 5,000 Units, Subcutaneous, Q8H, PAM Jung MD, 5,000 Units at 09/01/17 0543    losartan tablet 100 mg, 100 mg, Oral, After lunch, Susan Kapadia MD, 100 mg at 08/31/17 1425    ondansetron disintegrating tablet 8 mg, 8 mg, Oral, Q8H PRN, PAM Jung MD    paricalcitol injection 12 mcg, 12 mcg, Intravenous, Every Tues, Thurs, Sat, Susan  PAM Kapadia MD, 12 mcg at 08/31/17 1335    polyethylene glycol packet 17 g, 17 g, Oral, BID PRN, PAM Jung MD    promethazine tablet 25 mg, 25 mg, Oral, Q6H PRN, PAM Jung MD    ramelteon tablet 8 mg, 8 mg, Oral, Nightly PRN, PAM Jung MD    senna-docusate 8.6-50 mg per tablet 1 tablet, 1 tablet, Oral, BID, PAM Jung MD, 1 tablet at 08/31/17 2013    sevelamer carbonate tablet 1,600 mg, 1,600 mg, Oral, TID WM, Alfred Ernandez MD, 1,600 mg at 09/01/17 0858    vitamin E capsule 400 Units, 400 Units, Oral, QHS, Susan Kapadia MD, 400 Units at 08/31/17 2013    sodium chloride 0.9%, sodium chloride 0.9%, sodium chloride 0.9%, acetaminophen, capsaicin, dextrose 50%, ondansetron, polyethylene glycol, promethazine, ramelteon    Laboratory/Diagnostic Data:  Reviewed and noted in plan where applicable- Please see chart for full lab data.      Recent Labs  Lab 08/28/17  0506 08/29/17  0416 08/31/17  0439   WBC 7.15 7.06 6.65   HGB 10.1* 10.4* 10.8*   HCT 31.6* 31.4* 32.9*    264 188         Recent Labs  Lab 08/30/17  0442 08/31/17  0439 09/01/17  0448   * 136 138   K 5.1 3.7 4.2    99 102   CO2 19* 24 25   BUN 47* 40* 32*   CREATININE 5.2* 5.1* 4.4*   * 105 110   CALCIUM 8.9 8.9 9.5   MG 2.3 1.9 2.1   PHOS 4.7* 4.2 3.5         Recent Labs  Lab 08/26/17  1517   ALKPHOS 132   ALT 49*   AST 24   ALBUMIN 3.6   PROT 8.0   BILITOT 0.7        Microbiology labs for the last week  Microbiology Results (last 7 days)     ** No results found for the last 168 hours. **           Imaging Results          X-Ray Chest AP Portable (Final result)  Result time 09/01/17 07:53:28    Final result by Neil Benoit III, MD (09/01/17 07:53:28)                 Impression:     Pulmonary edema versus pneumonia.      Electronically signed by: NEIL BENOIT  Date:     09/01/17  Time:    07:53              Narrative:    One view: There is cardiomegaly, aortic plaque, bibasal edema and pleural  fluid right greater than left.                             CT Chest Without Contrast (Final result)     Abnormal  Result time 08/26/17 23:27:55    Final result by Richie Mata MD (08/26/17 23:27:55)                 Impression:        1.  Moderate sized bilateral pleural effusions, right greater than left with associated atelectatic change and bandlike opacities, most prominent within the right lower lobe.    2.  Multiple bilateral pulmonary nodules, the largest of which measures 0.7 x 0.5 cm within the apical segment of the right upper lobe (axial series 2, image 32). Per Fleischner Society 2017 guidelines, in a low or high risk patient: CT chest 3-6 months with followup CT chest in 18-24 months.    3.  Extensive coronary and aortic atherosclerosis.    4. Low-density nodular thickening of the right adrenal gland, favored to represent an adenoma.    5.  Additional findings include calcifications of the mitral and aortic valve, postsurgical changes in the region of the left adrenal gland, degenerative change of the spine, renal atrophy and right renal hypodensities which are too small to characterize.      This report has been flagged in the Epic medical record system.  ______________________________________     Electronically signed by resident: KILEY LEMOS MD  Date:     08/26/17  Time:    23:13            As the supervising and teaching physician, I personally reviewed the images and resident's interpretation and I agree with the findings.          Electronically signed by: Richie Mata  Date:     08/26/17  Time:    23:27              Narrative:    Time of Procedure: 08/26/17 22:20:22  Accession # [id number]    Technique: The chest was surveyed from the lung apices through the costophrenic angles without the use of intravenous contrast material.  Data was reformatted for contiguous 5 mm images in the axial, sagittal, and coronal planes.  Data was post processed for extraction of 1 mm images for effective  high-resolution CT imaging without additional radiation to the patient.    Comparison: Chest radiograph 8/26/2017.      Findings:    The structures at the base of the neck reveal no convincing evidence of disease.    There is a left-sided aortic arch with three branch vessels.  The thoracic aorta maintains normal caliber, contour, and course with significant atherosclerotic calcification within its course.      The heart is enlarged and there is no evidence of pericardial effusion.  There is significant radiopaque material within the mitral and aortic valves, which may correlate with extensive calcification versus prosthesis.  There is extensive calcification of the coronary arteries.    The pulmonary arteries distribute normally.  There are four pulmonary veins.  Systemic and pulmonary venoatrial connections are concordant.    The trachea and proximal airways are patent.    The lungs demonstrate bilateral pleural fluid, greatest on the right with associated atelectatic change.  Additional bandlike opacities are present bilaterally, most prominent within the right lower lobe.  Multiple bilateral pulmonary nodules are identified, the largest on the right measuring 0.7 x 0.5 cm within the apical segment of the right upper lobe (axial series 2, image 32).  The largest on the left measures 0.5 x 0.4 cm within the anterior segment of the left upper lobe (axial series 2, image 37).  No pneumothorax or significant pleural thickening.    There is no axillary or mediastinal lymph node enlargement.  The hilar contours are unremarkable on this non-contrast examination.    The esophagus maintains a normal course and caliber.    Limited views of the abdomen demonstrate significant renal atrophy with left renal hypodensities which are too small to characterize.  Surgical material is seen within the region of the left adrenal gland in this patient with a history of prior adrenal surgery.  Nodular hypodensity of the right adrenal  gland measuring at least 1.7 cm.  Although incompletely characterized, the density measures approximately 0 HU (series 2, image 119), consistent with an adenoma.     The osseous structures demonstrate degenerative change of the spine.                             X-Ray Chest 1 View (Final result)  Result time 08/26/17 16:15:11    Final result by Dustin Delacruz MD (08/26/17 16:15:11)                 Impression:     No significant interval change      Electronically signed by: Dr. DUSTIN DELACRUZ MD  Date:     08/26/17  Time:    16:15              Narrative:    Comparison: 12/13/2016    Results: Single view. Right sided central venous catheter has been removed. Cardiac silhouette is unchanged. Atherosclerotic calcification in the wall of the aortic arch. Pulmonary vascularity is prominent but stable. Continued mild blunting of both costophrenic angles with a little increased opacity at both lung bases likely representing mild volume loss/consolidation. No pneumothorax.                              Estimated body mass index is 25.19 kg/m² as calculated from the following:    Height as of this encounter: 5' (1.524 m).    Weight as of this encounter: 58.5 kg (128 lb 15.5 oz).    I & O (Last 24H):    Intake/Output Summary (Last 24 hours) at 09/01/17 1121  Last data filed at 09/01/17 0457   Gross per 24 hour   Intake             1190 ml   Output             3600 ml   Net            -2410 ml       Estimated Creatinine Clearance: 9 mL/min (based on SCr of 4.4 mg/dL (H)).    ASSESSMENT/PLAN:     Active Problems:    Active Hospital Problems    Diagnosis  POA    *Acute hypoxemic respiratory failure [J96.01]CT chest - Moderate sized bilateral pleural effusions, right greater than left with associated atelectatic change and bandlike opacities, most prominent within the right lower lobe. s/p pulmonology evaluation.Prior pleural tap 06/16- transudative. likely from worsening diastolic CHF, no plan for thoracentesis. on oxygen taper 3L -->  "1L via nasal canula.taper as tolerated. May need home oxygen .s/p Ultrafiltration  2.7 L 8/27,  3 L on 8/29 8/30 and 08/31. O2 sat dropped to 88 room air. currenlty on 1 L NC.  Possible HD today or in AM. CX ray - cardiomegaly, aortic plaque, bibasal edema and pleural fluid right greater than left    Yes    Acute on chronic combined systolic and diastolic heart failure [I50.43]BNP 5k. makes little urine. Nephrology consulted . Echo with Normal left ventricular systolic function (EF 60-65%). Right ventricular enlargement with mildly depressed systolic function.     Pulmonary hypertension  - with estimated PA systolic pressure is 80 mmHg.   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 SBP controlled . continue clonidine, losartan , carvedilol. Patient does not want to take hydralazine as it gave her "pounding in the chest" -she discontinued on her own. mentions she has "whitecoat hypertension" . blood pressure controlled this AM with SBP 130s  Yes    Pleural effusion [J90]as above   Yes    Pulmonary nodules [R91.8]  Unknown     Multiple bilateral pulmonary nodules, the largest of which measures 0.7 x 0.5 cm within the apical segment of the right upper lobe (axial series 2, image 32). Per Fleischner Society 2017 guidelines, in a low or high risk patient: CT chest 3-6 months with followup CT chest in 18-24 months.      Adrenal adenoma [D35.00]  Unknown     Low-density nodular thickening of the right adrenal gland      SOB (shortness of breath) [R06.02]lexa secondary to pleural effusions. improving  Yes    ESRD (end stage renal disease) on dialysis [N18.6, Z99.2]Nephrology consulted for HD. had full session prior to admission  but no fluid removed. HD per nephrology  Not Applicable     Chronic    Anemia of chronic renal failure, stage 5 [N18.5, D63.1]  Hb of 10.8 stable. monitor   Yes     Chronic    Type II diabetes mellitus with renal " manifestations [E11.29]hb A1c of 6.  not on medications at home. diet controlled   Yes      Resolved Hospital Problems    Diagnosis Date Resolved POA   No resolved problems to display.         Disposition- Home     DVT prophylaxis addressed with:SQ heparin             Alfred Ernandez MD  Attending Staff Physician  Hospital Medicine  pager- [phone] [facility]vn[facility] - [phone]

## 2017-09-02 VITALS
HEART RATE: 56 BPM | DIASTOLIC BLOOD PRESSURE: 99 MMHG | BODY MASS INDEX: 25.93 KG/M2 | WEIGHT: 132.06 LBS | TEMPERATURE: 98 F | OXYGEN SATURATION: 97 % | SYSTOLIC BLOOD PRESSURE: 169 MMHG | RESPIRATION RATE: 18 BRPM | HEIGHT: 60 IN

## 2017-09-02 PROBLEM — J96.01 ACUTE HYPOXEMIC RESPIRATORY FAILURE: Status: RESOLVED | Noted: 2017-08-27 | Resolved: 2017-09-02

## 2017-09-02 LAB
ANION GAP SERPL CALC-SCNC: 12 MMOL/L
BUN SERPL-MCNC: 61 MG/DL
CALCIUM SERPL-MCNC: 10.2 MG/DL
CHLORIDE SERPL-SCNC: 100 MMOL/L
CO2 SERPL-SCNC: 23 MMOL/L
CREAT SERPL-MCNC: 7.2 MG/DL
EST. GFR  (AFRICAN AMERICAN): 5.9 ML/MIN/1.73 M^2
EST. GFR  (NON AFRICAN AMERICAN): 5.1 ML/MIN/1.73 M^2
GLUCOSE SERPL-MCNC: 118 MG/DL
MAGNESIUM SERPL-MCNC: 2.3 MG/DL
PHOSPHATE SERPL-MCNC: 4.4 MG/DL
POTASSIUM SERPL-SCNC: 4.9 MMOL/L
SODIUM SERPL-SCNC: 135 MMOL/L

## 2017-09-02 PROCEDURE — 94761 N-INVAS EAR/PLS OXIMETRY MLT: CPT

## 2017-09-02 PROCEDURE — 80100016 HC MAINTENANCE HEMODIALYSIS

## 2017-09-02 PROCEDURE — 36415 COLL VENOUS BLD VENIPUNCTURE: CPT

## 2017-09-02 PROCEDURE — 25000003 PHARM REV CODE 250: Performed by: HOSPITALIST

## 2017-09-02 PROCEDURE — 25000003 PHARM REV CODE 250: Performed by: INTERNAL MEDICINE

## 2017-09-02 PROCEDURE — 99239 HOSP IP/OBS DSCHRG MGMT >30: CPT | Mod: ,,, | Performed by: HOSPITALIST

## 2017-09-02 PROCEDURE — 84100 ASSAY OF PHOSPHORUS: CPT

## 2017-09-02 PROCEDURE — 63600175 PHARM REV CODE 636 W HCPCS: Performed by: INTERNAL MEDICINE

## 2017-09-02 PROCEDURE — 80048 BASIC METABOLIC PNL TOTAL CA: CPT

## 2017-09-02 PROCEDURE — 90935 HEMODIALYSIS ONE EVALUATION: CPT | Mod: ,,, | Performed by: INTERNAL MEDICINE

## 2017-09-02 PROCEDURE — 94664 DEMO&/EVAL PT USE INHALER: CPT

## 2017-09-02 PROCEDURE — 25000003 PHARM REV CODE 250: Performed by: NURSE PRACTITIONER

## 2017-09-02 PROCEDURE — 83735 ASSAY OF MAGNESIUM: CPT

## 2017-09-02 RX ORDER — SEVELAMER CARBONATE 800 MG/1
1600 TABLET, FILM COATED ORAL
Qty: 180 TABLET | Refills: 11 | Status: SHIPPED | OUTPATIENT
Start: 2017-09-02 | End: 2019-01-01 | Stop reason: HOSPADM

## 2017-09-02 RX ADMIN — SODIUM CHLORIDE 400 ML: 0.9 INJECTION, SOLUTION INTRAVENOUS at 11:09

## 2017-09-02 RX ADMIN — AMLODIPINE BESYLATE 10 MG: 10 TABLET ORAL at 02:09

## 2017-09-02 RX ADMIN — CLONIDINE HYDROCHLORIDE 0.1 MG: 0.1 TABLET ORAL at 02:09

## 2017-09-02 RX ADMIN — SEVELAMER CARBONATE 1600 MG: 800 TABLET, FILM COATED ORAL at 02:09

## 2017-09-02 RX ADMIN — ACETAMINOPHEN 500 MG: 500 TABLET ORAL at 05:09

## 2017-09-02 RX ADMIN — SEVELAMER CARBONATE 1600 MG: 800 TABLET, FILM COATED ORAL at 09:09

## 2017-09-02 RX ADMIN — LOSARTAN POTASSIUM 100 MG: 50 TABLET, FILM COATED ORAL at 02:09

## 2017-09-02 RX ADMIN — HEPARIN SODIUM 5000 UNITS: 5000 INJECTION, SOLUTION INTRAVENOUS; SUBCUTANEOUS at 05:09

## 2017-09-02 RX ADMIN — PARICALCITOL 12 MCG: 5 INJECTION INTRAVENOUS at 11:09

## 2017-09-02 NOTE — PROGRESS NOTES
[facility] NEPHROLOGY HEMODIALYSIS NOTE     Patient currently on hemodialysis for removal of uremic toxins .     Patient seen and evaluated on hemodialysis, tolerating treatment, see HD flowsheet for vitals and assessments.      No Hypotension, chest pain, shortness of breath, cramping, nausea or vomiting.

## 2017-09-02 NOTE — DISCHARGE SUMMARY
"[facility] Hospital Medicine  Discharge Summary      Patient Name: Beth Salcedo  MRN: [id number]  Admission Date: 8/26/2017  Hospital Length of Stay: 7 days  Discharge Date and Time:  09/02/2017 11:51 AM  Attending Physician: Alfred Ernandez MD   Discharging Provider: Alfred Ernandez MD  Primary Care Provider: Emili Sanchez MD    [facility] Medicine Team: [facility] HOSP MED D Alfred Ernandez MD    HPI: Ms. Salcedo is a 73yo lady with a past medical history of ESRD on HD since 6/10/16, dialyzed T, Th and Saturday.  She also has HTN, DCHF and DM2.  She is dialyzed at [facility] and underwent her full 4 hours today, though no fluid was removed as she was at her dry weight (she does not know what that is).  After HD she took her oxygen monitor and checked her sats in the car and it was 74%, so she came straight to the ED.  She states that over the past week it has been running in the low 80's.  She states that she has been experiencing "shortness of breath, so I was taking it more often."     She came for preop with Dr. Yan yesterday, but the oxygen saturation was in the 80's, so the procedure was cancelled.  She was encouraged to come to the ED at that time, but she refused to go because, "I just didn't want to sit in there all day; I knew it would happen, just like it is now!"  She denies any chest pain with the shortness of breath.  She does have a chronic cough with mucous, but this is unchanged for her.  She has had no fever or chills, but is, "always cold."  All of the more recent symptoms of dyspnea have come on gradually a little over a week ago, but were not sudden in onset, nor are they associated with pleuritic chest pains.         Her daughter notes that her mother, "is much more slow with walking since last Monday, but didn't think too much about it."    * No surgery found *      Indwelling Lines/Drains at time of discharge:   Lines/Drains/Airways  " "   Central Venous Catheter Line                 Hemodialysis Catheter right subclavian -- days          Drain                 Hemodialysis AV Graft 02/20/17 0835 Left thigh 194 days              Hospital Course: was evaluated by nephrology    Active Problems:            Active Hospital Problems     Diagnosis   POA    *Acute hypoxemic respiratory failure [J96.01]CT chest - Moderate sized bilateral pleural effusions, right greater than left with associated atelectatic change and bandlike opacities, most prominent within the right lower lobe. s/p pulmonology evaluation.Prior pleural tap 06/16- transudative. likely from worsening diastolic CHF, no plan for thoracentesis. on oxygen taper 3L --> 1L via nasal canula.taper as tolerated. May need home oxygen .s/p Ultrafiltration  2.7 L 8/27,  3 L on 8/29 8/30 and 08/31 and 09/02. O2 sat 91% at 6 minute walk test. did not qualify for home oxygej   Yes    Acute on chronic combined systolic and diastolic heart failure [I50.43]BNP 5k. makes little urine. Nephrology consulted . Echo with Normal left ventricular systolic function (EF 60-65%). Right ventricular enlargement with mildly depressed systolic function.      Pulmonary hypertension  - with estimated PA systolic pressure is 80 mmHg.    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 SBP controlled . continue clonidine, losartan , carvedilol. Patient does not want to take hydralazine as it gave her "pounding in the chest" -she discontinued on her own. mentions she has "whitecoat hypertension" . blood pressure controlled this AM with SBP 130s   Yes    Pleural effusion [J90]as above    Yes    Pulmonary nodules [R91.8]   Unknown       Multiple bilateral pulmonary nodules, the largest of which measures 0.7 x 0.5 cm within the apical segment of the right upper lobe (axial series 2, image 32). Per Fleischner Society 2017 guidelines, in a low or high risk patient: " CT chest 3-6 months with followup CT chest in 18-24 months.    Adrenal adenoma [D35.00]   Unknown       Low-density nodular thickening of the right adrenal gland    SOB (shortness of breath) [R06.02]likley secondary to pleural effusions. improving   Yes    ESRD (end stage renal disease) on dialysis [N18.6, Z99.2]Nephrology consulted for HD. had full session prior to admission  but no fluid removed. HD per nephrology   Not Applicable       Chronic    Anemia of chronic renal failure, stage 5 [N18.5, D63.1]  Hb of 10.8 stable. monitor    Yes       Chronic    Type II diabetes mellitus with renal manifestations [E11.29]hb A1c of 6.  not on medications at home. diet controlled    Yes       Resolved Hospital Problems     Diagnosis Date Resolved POA     Being discharged with  nephrology follow up     Consults:   Consults         Status Ordering Provider     Inpatient consult to Critical Care Medicine  Once     Provider:  (Not yet assigned)    Completed SYLVIE LUNA     Inpatient consult to Nephrology  Once     Provider:  (Not yet assigned)    Completed SURY BARAHONA     Inpatient consult to Pulmonology  Once     Provider:  (Not yet assigned)    Completed PAM ANDERSON          Significant Diagnostic Studies: Labs:   BMP:     Recent Labs  Lab 09/01/17  0448 09/02/17  0606    118*    135*   K 4.2 4.9    100   CO2 25 23   BUN 32* 61*   CREATININE 4.4* 7.2*   CALCIUM 9.5 10.2   MG 2.1 2.3   , CMP     Recent Labs  Lab 09/01/17  0448 09/02/17  0606    135*   K 4.2 4.9    100   CO2 25 23    118*   BUN 32* 61*   CREATININE 4.4* 7.2*   CALCIUM 9.5 10.2   ANIONGAP 11 12   ESTGFRAFRICA 10.7* 5.9*   EGFRNONAA 9.3* 5.1*   , CBC No results for input(s): WBC, HGB, HCT, PLT in the last 48 hours., INR   Lab Results   Component Value Date    INR 1.1 07/11/2016    INR 1.0 06/12/2016    INR 1.0 06/04/2016   , Lipid Panel   Lab Results   Component Value Date    CHOL 180 06/28/2017    HDL 46  06/28/2017    LDLCALC 114.2 06/28/2017    TRIG 99 06/28/2017    CHOLHDL 25.6 06/28/2017   , Troponin     Recent Labs  Lab 08/26/17  1822   TROPONINI 0.035*   , A1C:     Recent Labs  Lab 08/30/17  0442   HGBA1C 6.0*    and All labs within the past 24 hours have been reviewed  Microbiology:   Blood Culture No results found for: LABBLOO, Sputum Culture No results found for: GSRESP, RESPIRATORYC and Urine Culture    Lab Results   Component Value Date    LABURIN No growth 10/14/2014     Radiology:   Imaging Results          X-Ray Chest AP Portable (Final result)  Result time 09/01/17 07:53:28    Final result by Neil Rose III, MD (09/01/17 07:53:28)                 Impression:     Pulmonary edema versus pneumonia.      Electronically signed by: NEIL ROSE  Date:     09/01/17  Time:    07:53              Narrative:    One view: There is cardiomegaly, aortic plaque, bibasal edema and pleural fluid right greater than left.                             CT Chest Without Contrast (Final result)     Abnormal  Result time 08/26/17 23:27:55    Final result by Richie Mata MD (08/26/17 23:27:55)                 Impression:        1.  Moderate sized bilateral pleural effusions, right greater than left with associated atelectatic change and bandlike opacities, most prominent within the right lower lobe.    2.  Multiple bilateral pulmonary nodules, the largest of which measures 0.7 x 0.5 cm within the apical segment of the right upper lobe (axial series 2, image 32). Per Fleischner Society 2017 guidelines, in a low or high risk patient: CT chest 3-6 months with followup CT chest in 18-24 months.    3.  Extensive coronary and aortic atherosclerosis.    4. Low-density nodular thickening of the right adrenal gland, favored to represent an adenoma.    5.  Additional findings include calcifications of the mitral and aortic valve, postsurgical changes in the region of the left adrenal gland, degenerative change of the spine,  renal atrophy and right renal hypodensities which are too small to characterize.      This report has been flagged in the Epic medical record system.  ______________________________________     Electronically signed by resident: KILEY LEMOS MD  Date:     08/26/17  Time:    23:13            As the supervising and teaching physician, I personally reviewed the images and resident's interpretation and I agree with the findings.          Electronically signed by: Richie Mata  Date:     08/26/17  Time:    23:27              Narrative:    Time of Procedure: 08/26/17 22:20:22  Accession # [id number]    Technique: The chest was surveyed from the lung apices through the costophrenic angles without the use of intravenous contrast material.  Data was reformatted for contiguous 5 mm images in the axial, sagittal, and coronal planes.  Data was post processed for extraction of 1 mm images for effective high-resolution CT imaging without additional radiation to the patient.    Comparison: Chest radiograph 8/26/2017.      Findings:    The structures at the base of the neck reveal no convincing evidence of disease.    There is a left-sided aortic arch with three branch vessels.  The thoracic aorta maintains normal caliber, contour, and course with significant atherosclerotic calcification within its course.      The heart is enlarged and there is no evidence of pericardial effusion.  There is significant radiopaque material within the mitral and aortic valves, which may correlate with extensive calcification versus prosthesis.  There is extensive calcification of the coronary arteries.    The pulmonary arteries distribute normally.  There are four pulmonary veins.  Systemic and pulmonary venoatrial connections are concordant.    The trachea and proximal airways are patent.    The lungs demonstrate bilateral pleural fluid, greatest on the right with associated atelectatic change.  Additional bandlike opacities are present  bilaterally, most prominent within the right lower lobe.  Multiple bilateral pulmonary nodules are identified, the largest on the right measuring 0.7 x 0.5 cm within the apical segment of the right upper lobe (axial series 2, image 32).  The largest on the left measures 0.5 x 0.4 cm within the anterior segment of the left upper lobe (axial series 2, image 37).  No pneumothorax or significant pleural thickening.    There is no axillary or mediastinal lymph node enlargement.  The hilar contours are unremarkable on this non-contrast examination.    The esophagus maintains a normal course and caliber.    Limited views of the abdomen demonstrate significant renal atrophy with left renal hypodensities which are too small to characterize.  Surgical material is seen within the region of the left adrenal gland in this patient with a history of prior adrenal surgery.  Nodular hypodensity of the right adrenal gland measuring at least 1.7 cm.  Although incompletely characterized, the density measures approximately 0 HU (series 2, image 119), consistent with an adenoma.     The osseous structures demonstrate degenerative change of the spine.                             X-Ray Chest 1 View (Final result)  Result time 08/26/17 16:15:11    Final result by Dustin Delacruz MD (08/26/17 16:15:11)                 Impression:     No significant interval change      Electronically signed by: Dr. DUSTIN DELACRUZ MD  Date:     08/26/17  Time:    16:15              Narrative:    Comparison: 12/13/2016    Results: Single view. Right sided central venous catheter has been removed. Cardiac silhouette is unchanged. Atherosclerotic calcification in the wall of the aortic arch. Pulmonary vascularity is prominent but stable. Continued mild blunting of both costophrenic angles with a little increased opacity at both lung bases likely representing mild volume loss/consolidation. No pneumothorax.                            Cardiac Graphics: NSR @ 82bpm.  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 [I15.0] 08/27/2017 Yes    Pleural effusion [J90] 08/27/2017 Yes    Pulmonary nodules [R91.8] 08/27/2017 Yes    Adrenal adenoma [D35.00] 08/27/2017 Yes    Secondary hyperparathyroidism of renal origin [N25.81]  Yes    ESRD (end stage renal disease) on dialysis [N18.6, Z99.2]  Not Applicable     Chronic    Hypervolemia [E87.70] 06/06/2016 Yes    Anemia of chronic renal failure, stage 5 [N18.5, D63.1]  Yes     Chronic    Type 2 diabetes mellitus with stage 5 chronic kidney disease and hypertension [E11.22, I12.0, N18.5] 09/13/2013 Yes      Problems Resolved During this Admission:    Diagnosis Date Noted Date Resolved POA    PRINCIPAL PROBLEM:  Acute hypoxemic respiratory failure [J96.01] 08/27/2017 09/02/2017 Yes    SOB (shortness of breath) [R06.02] 08/26/2017 08/29/2017 Yes      Discharged Condition: fair    Disposition: Home    Follow Up:  Follow-up Information     Emili Sanchez MD On 9/12/2017.    Specialty:  Internal Medicine  Why:  11:00 am, For Hospital Follow-up  Contact information:  [address]  [place] [address]  [phone]                 Patient Instructions:   No discharge procedures on file.  Medications:  Reconciled Home Medications:      Beth Salcedo   Home Medication Instructions SEGUNDO:[id number]    Printed on:09/02/17 8919   Medication Information                      amlodipine (NORVASC) 10 MG tablet  TAKE ONE DAILY GENERIC NORVASC              aspirin 81 mg Tab  Take 1 tablet by mouth Daily.             blood sugar diagnostic Strp  Test 3 times daily. Please dispense 1 meter covered by insurance. Meter, Strips, and lancets             calcitRIOL (ROCALTROL) 0.25 MCG Cap  Take 1 capsule (0.25 mcg total) by mouth once daily.             calcium carbonate (OS-HILLARY) 500 mg calcium (1,250 mg) tablet  Take 1 tablet (500 mg total) by mouth 3 (three) times daily with meals.             carvedilol (COREG) 12.5 MG tablet  Take 1 tablet (12.5 mg total) by mouth 2 (two) times daily.             cloNIDine 0.3 mg/24 hr td ptwk (CATAPRES) 0.3 mg/24 hr  Place 1 patch onto the skin once a week.              docusate sodium (COLACE) 50 MG capsule  Take 1 capsule (50 mg total) by mouth 2 (two) times daily.             losartan (COZAAR) 100 MG tablet  Take 1 tablet (100 mg total) by mouth once daily.             sevelamer carbonate (RENVELA) 800 mg Tab  Take 2 tablets (1,600 mg total) by mouth 3 (three) times daily with meals.             VITAMIN D2 50,000 unit capsule  ONE EVERY WEEK                    Alfred Ernandez MD  Department of Hospital Medicine  [facility]

## 2017-09-02 NOTE — PROGRESS NOTES
Dialysis completed. Needles removed from left thigh graft with pressure held to needle sites for 10 minutes each with hemostasis achieved. Gauze and tape to sites. Patient dialyzed for 4 hours with fluid removal of 2.1 liters. Tolerated well with stable vital signs. Report called to floor to Kamryn Santillan.

## 2017-09-02 NOTE — PROGRESS NOTES
Patient received from floor with report from MIRADNA Santillan.  Maintenance dialysis began per orders via 15 gauge fistula needles to left thigh graft.

## 2017-09-02 NOTE — PROGRESS NOTES
Patient is ready for discharge. Patient stable alert and oriented. IVs removed. No complaints of pain. Discussed discharge plan. Reviewed medications and side effects, appointments, and answered questions with patient and family.Rx sent to pharmacy.

## 2017-09-02 NOTE — PLAN OF CARE
Problem: Patient Care Overview  Goal: Plan of Care Review  Plan of care discussed with patient. Patient is free of fall/trauma/injury. Denies CP, SOB, or pain/discomfort. Currently in dialysis. Will try to wean her off of O2 when she returns.  All questions addressed. Will continue to monitor

## 2017-09-02 NOTE — DISCHARGE INSTRUCTIONS
Multiple bilateral pulmonary nodules, the largest of which measures 0.7 x 0.5 cm within the apical segment of the right upper lobe (axial series 2, image 32). Per Fleischner Society 2017 guidelines, in a low or high risk patient: Please obtain CT chest 3-6 months with followup CT chest in 18-24 months.

## 2017-09-05 ENCOUNTER — PATIENT OUTREACH (OUTPATIENT)
Dept: ADMINISTRATIVE | Facility: CLINIC | Age: 72
End: 2017-09-05

## 2017-09-05 NOTE — PATIENT INSTRUCTIONS

## 2017-09-13 ENCOUNTER — OFFICE VISIT (OUTPATIENT)
Dept: INTERNAL MEDICINE | Facility: CLINIC | Age: 72
End: 2017-09-13
Payer: MEDICARE

## 2017-09-13 DIAGNOSIS — R91.1 LUNG NODULE: Primary | ICD-10-CM

## 2017-09-13 DIAGNOSIS — I10 ESSENTIAL HYPERTENSION: ICD-10-CM

## 2017-09-13 PROCEDURE — 99999 PR PBB SHADOW E&M-EST. PATIENT-LVL V: CPT | Mod: PBBFAC,,, | Performed by: INTERNAL MEDICINE

## 2017-09-13 PROCEDURE — 1159F MED LIST DOCD IN RCRD: CPT | Mod: S$GLB,,, | Performed by: INTERNAL MEDICINE

## 2017-09-13 PROCEDURE — 3008F BODY MASS INDEX DOCD: CPT | Mod: S$GLB,,, | Performed by: INTERNAL MEDICINE

## 2017-09-13 PROCEDURE — 99499 UNLISTED E&M SERVICE: CPT | Mod: S$GLB,,, | Performed by: INTERNAL MEDICINE

## 2017-09-13 PROCEDURE — 99215 OFFICE O/P EST HI 40 MIN: CPT | Mod: S$GLB,,, | Performed by: INTERNAL MEDICINE

## 2017-09-13 PROCEDURE — 1126F AMNT PAIN NOTED NONE PRSNT: CPT | Mod: S$GLB,,, | Performed by: INTERNAL MEDICINE

## 2017-09-14 ENCOUNTER — OFFICE VISIT (OUTPATIENT)
Dept: SURGERY | Facility: CLINIC | Age: 72
End: 2017-09-14
Payer: MEDICARE

## 2017-09-14 VITALS
TEMPERATURE: 98 F | BODY MASS INDEX: 25.91 KG/M2 | HEART RATE: 65 BPM | HEIGHT: 60 IN | WEIGHT: 132 LBS | DIASTOLIC BLOOD PRESSURE: 92 MMHG | SYSTOLIC BLOOD PRESSURE: 230 MMHG | OXYGEN SATURATION: 95 %

## 2017-09-14 DIAGNOSIS — N18.5 TYPE 2 DIABETES MELLITUS WITH STAGE 5 CHRONIC KIDNEY DISEASE AND HYPERTENSION: ICD-10-CM

## 2017-09-14 DIAGNOSIS — I50.32 CHRONIC DIASTOLIC HEART FAILURE: Chronic | ICD-10-CM

## 2017-09-14 DIAGNOSIS — I12.0 TYPE 2 DIABETES MELLITUS WITH STAGE 5 CHRONIC KIDNEY DISEASE AND HYPERTENSION: ICD-10-CM

## 2017-09-14 DIAGNOSIS — I27.20 PULMONARY HYPERTENSION: ICD-10-CM

## 2017-09-14 DIAGNOSIS — N18.6 ESRD (END STAGE RENAL DISEASE): Primary | ICD-10-CM

## 2017-09-14 DIAGNOSIS — E11.22 TYPE 2 DIABETES MELLITUS WITH STAGE 5 CHRONIC KIDNEY DISEASE AND HYPERTENSION: ICD-10-CM

## 2017-09-14 PROCEDURE — 99499 UNLISTED E&M SERVICE: CPT | Mod: S$GLB,,, | Performed by: SURGERY

## 2017-09-14 PROCEDURE — 99024 POSTOP FOLLOW-UP VISIT: CPT | Mod: S$GLB,,, | Performed by: SURGERY

## 2017-09-14 PROCEDURE — 99999 PR PBB SHADOW E&M-EST. PATIENT-LVL III: CPT | Mod: PBBFAC,,, | Performed by: SURGERY

## 2017-09-14 NOTE — PROGRESS NOTES
Subjective:       Patient ID: Beth Salcedo is a 72 y.o. female.    Chief Complaint: Follow-up    HPI 72 y/owith ESRD on dialysis TRSa, HTN, DM2, dHF, severe pulmonary htn, pleural effusions.  She was put off for her PD procedure 8/25/17 due to hypoxia and was admitted to the hospital for 7 days.  She says she feels well but has chronic sob.  She is right handed and wears her belt line above her navel.  Review of Systems   Constitutional: Negative for fever.   Respiratory: Positive for cough and shortness of breath. Negative for chest tightness.    Gastrointestinal: Negative for abdominal pain, constipation, diarrhea, nausea and vomiting.        Had vomiting and diarrhea when she was recently hospitalized   Genitourinary: Negative for difficulty urinating and dysuria.        Urinates very little   Hematological: Does not bruise/bleed easily.       Objective:      Physical Exam   Constitutional: She appears well-developed and well-nourished.   Abdominal: Soft. She exhibits no distension. There is no tenderness.       Skin: Skin is warm and dry.   Psychiatric: She has a normal mood and affect. Her behavior is normal. Judgment and thought content normal.   Vitals reviewed.      Assessment:       1. ESRD (end stage renal disease)    2. Pulmonary hypertension    3. Type 2 diabetes mellitus with stage 5 chronic kidney disease and hypertension    4. Chronic diastolic heart failure        Plan:       I doubt she is a candidate for PD both because of heart disease, pulmonary htn and she may have adhesions, as well.   Will discuss with nephrology and if we continue will get pulmonary (has appointment already), cardiac and anesthesia clearance.

## 2017-09-14 NOTE — LETTER
[facility] - General Surgery  [address]  [place] [address]  Phone: [phone] September 14, 2017      Emili Sanchez MD  [address]  [place] [address]    Patient: Beth Salcedo   MR Number: [id number]   YOB: 1945   Date of Visit: 9/14/2017     Dear Dr. Sanchez:    Thank you for referring Beth Salcedo to me for evaluation. Below are the relevant portions of my assessment and plan of care.    ASSESSMENT:  1. ESRD (end stage renal disease)    2. Pulmonary hypertension    3. Type 2 diabetes mellitus with stage 5 chronic kidney disease and hypertension    4. Chronic diastolic heart failure      PLAN: I doubt she is a candidate for PD both because of heart disease, pulmonary HTN, and she may have adhesions, as well.     If you have questions, please do not hesitate to call me. I look forward to following Beth along with you.    Sincerely,      Jeffrey Yan MD   Section Head - General, Laparoscopic, Bariatric  Acute Care and Oncologic Surgery   - Surgical Weight Loss Program  [facility]    WSR/sarojk    CC  MD Orlando Hewitt MD

## 2017-09-17 VITALS
WEIGHT: 134 LBS | DIASTOLIC BLOOD PRESSURE: 82 MMHG | HEIGHT: 60 IN | BODY MASS INDEX: 26.31 KG/M2 | HEART RATE: 70 BPM | OXYGEN SATURATION: 91 % | SYSTOLIC BLOOD PRESSURE: 138 MMHG | TEMPERATURE: 99 F

## 2017-09-17 NOTE — PROGRESS NOTES
Subjective:       Patient ID: Beth Salcedo is a 72 y.o. female.    Chief Complaint: Hypertension    HPI: She returns for management of hypertension.  She has had hypertension for over a year.  Current treatment has included medications outlined in medication list.  She denies chest pain or shortness of breath.  No palpitations.  Denies left arm or neck pain.    Past medical history: Hypertension, diabetes, hyperlipidemia, end-stage renal disease on dialysis, renal angiomyolipoma, status post hysterectomy, status post unilateral adrenalectomy (benign)       Medications: Norvasc 10 mg daily, coreg 12.5 mg twice a day, clonidine patch      ALLERGIES: Hydralazine, Zocor, Altace    Review of Systems   Constitutional: Negative for chills, fatigue, fever and unexpected weight change.   Respiratory: Negative for chest tightness and shortness of breath.    Cardiovascular: Negative for chest pain and palpitations.   Gastrointestinal: Negative for abdominal pain and blood in stool.   Neurological: Negative for dizziness, syncope, numbness and headaches.       Objective:      Physical Exam   HENT:   Right Ear: External ear normal.   Left Ear: External ear normal.   Nose: Nose normal.   Mouth/Throat: Oropharynx is clear and moist.   Eyes: Pupils are equal, round, and reactive to light.   Neck: Normal range of motion.   Cardiovascular: Normal rate and regular rhythm.    No murmur heard.  Pulmonary/Chest: Breath sounds normal.   Abdominal: She exhibits no distension. There is no hepatosplenomegaly. There is no tenderness.   Lymphadenopathy:     She has no cervical adenopathy.     She has no axillary adenopathy.   Neurological: She has normal strength and normal reflexes. No cranial nerve deficit or sensory deficit.       Assessment:         assessment and plan: 1.  Hypertension: Controlled  2.  Lung nodule: Follow up with pulmonary  Plan:       As above

## 2017-09-19 ENCOUNTER — TELEPHONE (OUTPATIENT)
Dept: NEPHROLOGY | Facility: CLINIC | Age: 72
End: 2017-09-19

## 2017-09-19 NOTE — TELEPHONE ENCOUNTER
I don't know who this patient is and I was not notified about her wanting to do PD.  Ivo, if she is your patient, we can discuss her and see if we need to proceed with PD before she gets a cathter so we can do this in an organized fashion.  Thanks.

## 2017-09-19 NOTE — TELEPHONE ENCOUNTER
----- Message from Orlando Abarca MD sent at 9/18/2017  6:06 PM CDT -----  I am not so sure either. I will have Dr. Mora take a look at her again as she would be her PD nephrologist. The patient strongly advocates for it. Romy, please take a look at her and let's discuss the case.   ----- Message -----  From: Jeffrey Yan MD  Sent: 9/14/2017   9:27 AM  To: Emili Sanchez MD, Orlando Abarca MD    I am not sure she is a PD candidate.  Please see my note and make sure you want me to proceed to look into it before I set up pulmonary, cardiac and anesthesia clearance.

## 2017-09-27 ENCOUNTER — OFFICE VISIT (OUTPATIENT)
Dept: PULMONOLOGY | Facility: CLINIC | Age: 72
End: 2017-09-27
Payer: MEDICARE

## 2017-09-27 VITALS
HEART RATE: 73 BPM | DIASTOLIC BLOOD PRESSURE: 68 MMHG | BODY MASS INDEX: 27.14 KG/M2 | HEIGHT: 60 IN | WEIGHT: 138.25 LBS | SYSTOLIC BLOOD PRESSURE: 152 MMHG

## 2017-09-27 DIAGNOSIS — N18.6 ESRD (END STAGE RENAL DISEASE): ICD-10-CM

## 2017-09-27 DIAGNOSIS — J90 PLEURAL EFFUSION: ICD-10-CM

## 2017-09-27 DIAGNOSIS — I27.20 PULMONARY HYPERTENSION: ICD-10-CM

## 2017-09-27 DIAGNOSIS — I50.32 CHRONIC DIASTOLIC HEART FAILURE: Primary | Chronic | ICD-10-CM

## 2017-09-27 DIAGNOSIS — J96.11 CHRONIC HYPOXEMIC RESPIRATORY FAILURE: ICD-10-CM

## 2017-09-27 PROCEDURE — 3008F BODY MASS INDEX DOCD: CPT | Mod: S$GLB,,, | Performed by: INTERNAL MEDICINE

## 2017-09-27 PROCEDURE — 99499 UNLISTED E&M SERVICE: CPT | Mod: S$GLB,,, | Performed by: INTERNAL MEDICINE

## 2017-09-27 PROCEDURE — 99999 PR PBB SHADOW E&M-EST. PATIENT-LVL II: CPT | Mod: PBBFAC,,, | Performed by: INTERNAL MEDICINE

## 2017-09-27 PROCEDURE — 1159F MED LIST DOCD IN RCRD: CPT | Mod: S$GLB,,, | Performed by: INTERNAL MEDICINE

## 2017-09-27 PROCEDURE — 99214 OFFICE O/P EST MOD 30 MIN: CPT | Mod: S$GLB,,, | Performed by: INTERNAL MEDICINE

## 2017-09-27 NOTE — PROGRESS NOTES
Subjective:       Patient ID: Beth Salcedo is a 72 y.o. female.    Chief Complaint: Pulmonary Nodules    72 year old with uncontrolled HTN, ESRD on HD and DM.  Seen in hospital in August for hypoxic respiratory failure and has bilateral pleural effusions.  Patient usually stops HD early because she gets cramping and gets fluid back when cramping begins.  Patient is a lifetime nonsmoker.  Dry weight for HD is 59kg (today she is 63kg).  Oxygenation has decreased since she initiated hd.    Seen in hospital by my partner Dr. Jaramillo who agrees that more ultrafiltrate should be removed.    Peritoneal HD access was denied due to chronic hypoxia and SpO2 has been in 80's due to chronic PH, diastolic heart failure and concern for adhesions.  Review of Systems    Objective:       Vitals:    09/27/17 1016   BP: (!) 152/68   BP Location: Right arm   Patient Position: Sitting   Pulse: 73   Weight: 62.7 kg (138 lb 3.7 oz)   Height: 5' (1.524 m)   83% ON ROOM AIR AT REST.    Physical Exam     Personal Diagnostic Review  Electrocardiogram: CONCLUSIONS     1 - Severe left atrial enlargement.     2 - Moderate right atrial enlargement.     3 - Eccentric hypertrophy.     4 - Normal left ventricular systolic function (EF 60-65%).     5 - Right ventricular enlargement with mildly depressed systolic function.     6 - Moderate tricuspid regurgitation.     7 - Pulmonary hypertension. The estimated PA systolic pressure is 80 mmHg.     8 - Trivial pericardial effusion.     Ct WITH 7 MM NODULES BUT MOST significant for bilateral pleural effusions and pulmonary edema  No flowsheet data found.      Assessment:       1. Chronic diastolic heart failure    2. Chronic hypoxemic respiratory failure    3. ESRD (end stage renal disease)    4. Pulmonary hypertension    5. Pleural effusion          Orders Placed This Encounter   Procedures    OXYGEN FOR HOME USE     Order Specific Question:   Liter Flow     Answer:   2     Order Specific  Question:   Duration     Answer:   Continuous     Order Specific Question:   Qualifying SpO2:     Answer:   83     Order Specific Question:   Testing done at:     Answer:   Rest     Order Specific Question:   Route     Answer:   nasal cannula     Order Specific Question:   Portable mode:     Answer:   pulse dose acceptable     Order Specific Question:   Device     Answer:   home concentrator with portable unit     Comments:   POC of choice     Order Specific Question:   Patient condition with qualifying saturation     Answer:   CHF     Order Specific Question:   Height:     Answer:   5' (1.524 m)     Order Specific Question:   Weight:     Answer:   62.7 kg (138 lb 3.7 oz)     Order Specific Question:   Alternative treatment measures have been tried or considered and deemed clinically ineffective.     Answer:   Yes     Plan:       Pulmonary nodules low risk for malignancy associated with pulmonary edema and atelectasis due to pleural effusions  Recommend increasing UF  Patient would benefit from portable oxygen of patient's choice.  Follow up with nephrology and cardiology    PCP may order repeat CT in 6 months once effusions have improved, resolved.

## 2017-09-27 NOTE — LETTER
September 27, 2017      Emili Sanchez MD  [address]  [place] [address]           [facility]monary [facility]  [address]  [place] [address]  Phone: [phone]          Patient: Beth Salcedo   MR Number: [id number]   YOB: 1945   Date of Visit: 9/27/2017       Dear Dr. Emili Sanchez:    Thank you for referring Beth Salcedo to me for evaluation. Attached you will find relevant portions of my assessment and plan of care.    If you have questions, please do not hesitate to call me. I look forward to following Beth Salcedo along with you.    Sincerely,    Marisela Davis MD    Enclosure  CC:  No Recipients    If you would like to receive this communication electronically, please contact [email or web address] or [phone] to request more information on [facility] access.    For providers and/or their staff who would like to refer a patient to [facility], please contact us through our one-stop-shop provider referral line, [facility], at [phone].    If you feel you have received this communication in error or would no longer like to receive these types of communications, please e-mail [email or web address]

## 2017-10-02 DIAGNOSIS — I16.0 HYPERTENSIVE URGENCY: ICD-10-CM

## 2017-10-03 RX ORDER — AMLODIPINE BESYLATE 10 MG/1
TABLET ORAL
Refills: 0 | OUTPATIENT
Start: 2017-10-03

## 2017-10-03 RX ORDER — AMLODIPINE BESYLATE 10 MG/1
10 TABLET ORAL DAILY
Qty: 30 TABLET | Refills: 3 | Status: SHIPPED | OUTPATIENT
Start: 2017-10-03 | End: 2017-10-04 | Stop reason: SDUPTHER

## 2017-10-03 RX ORDER — LOSARTAN POTASSIUM 100 MG/1
100 TABLET ORAL DAILY
Qty: 30 TABLET | Refills: 3 | Status: SHIPPED | OUTPATIENT
Start: 2017-10-03 | End: 2018-06-07 | Stop reason: SDUPTHER

## 2017-10-03 NOTE — TELEPHONE ENCOUNTER
----- Message from Sarah Headley MA sent at 10/3/2017  3:11 PM CDT -----  Contact: Lawanda linares/Juan Melgar - [phone]   Type: Rx    Name of medication(s): losartan (COZAAR) 100 MG tablet  amlodipine (NORVASC) 10 MG tablet    Is this a refill? New rx? New Rxs    Who prescribed medication?    Pharmacy Name, Phone, & Location:    Comments: patient is at the pharmacy. Please call. Thanks!

## 2017-10-03 NOTE — TELEPHONE ENCOUNTER
----- Message from Sarah Headley MA sent at 10/3/2017 10:37 AM CDT -----  Contact: willie linares/jama [employer] [phone]   Type: Rx    Name of medication(s): amlodipine (NORVASC) 10 MG tablet [Pharmacy Med Name: AMLODIPINE BESYLATE 10MG TABS]    Is this a refill? New rx? Refills     Who prescribed medication?    Pharmacy Name, Phone, & Location:    Comments: Please call. Thanks!

## 2017-10-04 RX ORDER — AMLODIPINE BESYLATE 10 MG/1
10 TABLET ORAL DAILY
Qty: 30 TABLET | Refills: 3 | Status: SHIPPED | OUTPATIENT
Start: 2017-10-04 | End: 2018-06-02 | Stop reason: SDUPTHER

## 2017-10-04 NOTE — TELEPHONE ENCOUNTER
----- Message from Jayde Casillas sent at 10/4/2017  9:51 AM CDT -----  Contact: Self/ [phone]   Type: Rx    Name of medication(s): amlodipine (NORVASC) 10 MG tablet    Is this a refill? New rx? Refill     Who prescribed medication? Dr. Sanchez     Pharmacy Name, Phone, & Location: [facility] - [facility] - [address] [phone] (Phone)  [phone] (Fax)    Comments: pt is calling to have a refill on the Rx above. Please call and advise     Thank you

## 2017-11-30 PROBLEM — I50.30 (HFPEF) HEART FAILURE WITH PRESERVED EJECTION FRACTION: Status: ACTIVE | Noted: 2017-08-29

## 2017-12-12 ENCOUNTER — OFFICE VISIT (OUTPATIENT)
Dept: PULMONOLOGY | Facility: CLINIC | Age: 72
End: 2017-12-12
Payer: MEDICARE

## 2017-12-12 VITALS
SYSTOLIC BLOOD PRESSURE: 132 MMHG | DIASTOLIC BLOOD PRESSURE: 42 MMHG | BODY MASS INDEX: 26.87 KG/M2 | RESPIRATION RATE: 16 BRPM | HEIGHT: 60 IN | HEART RATE: 59 BPM | WEIGHT: 136.88 LBS | OXYGEN SATURATION: 93 %

## 2017-12-12 DIAGNOSIS — I27.20 PULMONARY HYPERTENSION: Primary | ICD-10-CM

## 2017-12-12 DIAGNOSIS — N18.6 ESRD (END STAGE RENAL DISEASE) ON DIALYSIS: Chronic | ICD-10-CM

## 2017-12-12 DIAGNOSIS — I10 ESSENTIAL HYPERTENSION: Chronic | ICD-10-CM

## 2017-12-12 DIAGNOSIS — J90 PLEURAL EFFUSION: ICD-10-CM

## 2017-12-12 DIAGNOSIS — I50.32 CHRONIC DIASTOLIC HEART FAILURE: Chronic | ICD-10-CM

## 2017-12-12 DIAGNOSIS — Z99.2 ESRD (END STAGE RENAL DISEASE) ON DIALYSIS: Chronic | ICD-10-CM

## 2017-12-12 PROCEDURE — 99999 PR PBB SHADOW E&M-EST. PATIENT-LVL IV: CPT | Mod: PBBFAC,,, | Performed by: INTERNAL MEDICINE

## 2017-12-12 PROCEDURE — 99499 UNLISTED E&M SERVICE: CPT | Mod: S$GLB,,, | Performed by: INTERNAL MEDICINE

## 2017-12-12 PROCEDURE — 99214 OFFICE O/P EST MOD 30 MIN: CPT | Mod: 25,S$GLB,, | Performed by: INTERNAL MEDICINE

## 2017-12-13 NOTE — PROGRESS NOTES
Subjective:       Patient ID: Beth Salcedo is a 72 y.o. female.    Chief Complaint: Pulmonary Hypertension    HPI Mrs. Salcedo is a 72-year-old nonsmoker who comes for assessment of pulmonary   hypertension.  She has a complicated past medical history.  She has end-stage   renal disease and has been supported with hemodialysis since June of last year.    She believes her renal failure is related primarily to longstanding   hypertension.  She is followed in the Cardiology Clinic for management of   hypertension.  She also has a history of left ventricular hypertrophy and   diastolic heart failure.  In addition to these chronic medical problems, she   also has diabetes.    Mrs. Salcedo has had previous evaluations by our section.  Most recently, she    was seen for assessment of chronic or recurrent pleural effusions.    Otherwise, she is not aware of any proven chronic lung diseases.  She does not   have any history of connective tissue disease.  She has chronic venous   insufficiency involving her lower extremities, but no known episodes of DVT or   pulmonary emboli.    Ms. Salcedo is not currently taking any prescribed medications for   respiratory symptoms.  She is a lifelong nonsmoker.  She does not know of any   important past occupational exposures, which might be associated with chronic   lung disease.    DATA:  I have reviewed the images from a chest x-ray and CT scan done in August.    The cardiac silhouette is large.  At that time, there were bilateral pleural   effusions (right greater than left).  Note was also made of multiple   subcentimeter nodules scattered within the lungs.  These studies did not show   any obvious diffuse parenchymal lung abnormalities.    A bedside echocardiogram also done in August was noted to show biatrial   enlargement, normal left ventricular systolic function, and a mildly enlarged   right ventricle with decreased systolic function.  In this study,  the estimated   pulmonary artery pressure was 80 mmHg systolic.      CB/IN  dd: 12/12/2017 20:43:49 (CST)  td: 12/13/2017 17:37:36 (CST)  Doc ID   #[id number]  Job ID #[id number]    CC:       Review of Systems   Constitutional: Negative for fever and fatigue.   HENT: Negative for postnasal drip, sinus pressure, voice change and congestion.    Respiratory: Positive for shortness of breath and dyspnea on extertion. Negative for cough, sputum production and wheezing.    Cardiovascular: Negative for chest pain and leg swelling.   Genitourinary: Negative for difficulty urinating.   Musculoskeletal: Negative for arthralgias and back pain.   Skin: Negative for rash.   Gastrointestinal: Negative for abdominal pain and acid reflux.   Neurological: Negative for dizziness and weakness.   Hematological: Negative for adenopathy.       Objective:      Physical Exam   Constitutional: She is oriented to person, place, and time. She appears well-developed and well-nourished.   HENT:   Head: Normocephalic.   Nose: Nose normal.   Mouth/Throat: No oropharyngeal exudate.   Neck: Normal range of motion. No JVD present. No tracheal deviation present. No thyromegaly present.   Cardiovascular: Normal rate and regular rhythm.    Murmur (2/6 syst M at aortic area) heard.  Pulmonary/Chest: No stridor. She has decreased breath sounds (decreased at bases (R>L)). She has no wheezes. She has no rhonchi. She has no rales. She exhibits no tenderness.   Abdominal: Soft. There is no tenderness.   Musculoskeletal: She exhibits no edema.   Lymphadenopathy:     She has no cervical adenopathy.   Neurological: She is alert and oriented to person, place, and time.   Skin: Skin is warm and dry. No rash noted. No erythema. Nails show no clubbing.   Psychiatric: She has a normal mood and affect.   Vitals reviewed.    Personal Diagnostic Review    No flowsheet data found.      Assessment:       1. Pulmonary hypertension    2. Pleural effusion    3. ESRD (end stage  renal disease) on dialysis    4. Essential hypertension    5. Chronic diastolic heart failure        Outpatient Encounter Prescriptions as of 12/12/2017   Medication Sig Dispense Refill    amlodipine (NORVASC) 10 MG tablet Take 1 tablet (10 mg total) by mouth once daily. 30 tablet 3    aspirin 81 mg Tab Take 1 tablet by mouth Daily.      blood sugar diagnostic Strp Test 3 times daily. Please dispense 1 meter covered by insurance. Meter, Strips, and lancets 150 each 5    calcium carbonate (OS-HILLARY) 500 mg calcium (1,250 mg) tablet Take 1 tablet (500 mg total) by mouth 3 (three) times daily with meals.  0    carvedilol (COREG) 12.5 MG tablet Take 1 tablet (12.5 mg total) by mouth 2 (two) times daily. 60 tablet 11    cloNIDine 0.3 mg/24 hr td ptwk (CATAPRES) 0.3 mg/24 hr Place 1 patch onto the skin once a week.       docusate sodium (COLACE) 50 MG capsule Take 1 capsule (50 mg total) by mouth 2 (two) times daily. (Patient taking differently: Take 50 mg by mouth as needed. )  0    losartan (COZAAR) 100 MG tablet Take 1 tablet (100 mg total) by mouth once daily. 30 tablet 3    sevelamer carbonate (RENVELA) 800 mg Tab Take 2 tablets (1,600 mg total) by mouth 3 (three) times daily with meals. 180 tablet 11    VITAMIN D2 50,000 unit capsule ONE EVERY WEEK (Patient taking differently: PT TAKEN EVERY TUES,THUR ,SAT) 30 capsule 6     No facility-administered encounter medications on file as of 12/12/2017.      Orders Placed This Encounter   Procedures    X-Ray Chest PA And Lateral     Standing Status:   Future     Standing Expiration Date:   12/12/2018    Spirometry without bronchodilator     Standing Status:   Future     Standing Expiration Date:   12/12/2018    DLCO-Carbon Monoxide Diffusing Capacity     Standing Status:   Future     Standing Expiration Date:   12/12/2018     Plan:     CXR, Spirometry, and DLCO.  Plan to discuss above results with Dr Salas, and decide on utility of PH Clinic evaluation.

## 2017-12-13 NOTE — PATIENT INSTRUCTIONS
CXR, Spirometry, and DLCO.  Plan to discuss above results with Dr Salas, and decide on utility of PH Clinic evaluation.

## 2018-01-01 ENCOUNTER — TELEPHONE (OUTPATIENT)
Dept: OPHTHALMOLOGY | Facility: CLINIC | Age: 73
End: 2018-01-01

## 2018-01-01 ENCOUNTER — OFFICE VISIT (OUTPATIENT)
Dept: OPHTHALMOLOGY | Facility: CLINIC | Age: 73
End: 2018-01-01
Payer: MEDICARE

## 2018-01-01 ENCOUNTER — HOSPITAL ENCOUNTER (OUTPATIENT)
Facility: HOSPITAL | Age: 73
Discharge: HOME | End: 2018-08-21
Attending: OPHTHALMOLOGY | Admitting: OPHTHALMOLOGY
Payer: MEDICARE

## 2018-01-01 ENCOUNTER — ANESTHESIA (OUTPATIENT)
Dept: SURGERY | Facility: HOSPITAL | Age: 73
End: 2018-01-01
Payer: MEDICARE

## 2018-01-01 ENCOUNTER — PES CALL (OUTPATIENT)
Dept: ADMINISTRATIVE | Facility: CLINIC | Age: 73
End: 2018-01-01

## 2018-01-01 ENCOUNTER — HOSPITAL ENCOUNTER (OUTPATIENT)
Facility: HOSPITAL | Age: 73
Discharge: HOME | End: 2018-08-07
Attending: OPHTHALMOLOGY | Admitting: OPHTHALMOLOGY
Payer: MEDICARE

## 2018-01-01 ENCOUNTER — SURGERY (OUTPATIENT)
Age: 73
End: 2018-01-01

## 2018-01-01 ENCOUNTER — OFFICE VISIT (OUTPATIENT)
Dept: INTERNAL MEDICINE | Facility: CLINIC | Age: 73
End: 2018-01-01
Payer: MEDICARE

## 2018-01-01 ENCOUNTER — ANESTHESIA EVENT (OUTPATIENT)
Dept: SURGERY | Facility: HOSPITAL | Age: 73
End: 2018-01-01
Payer: MEDICARE

## 2018-01-01 VITALS
WEIGHT: 143.31 LBS | SYSTOLIC BLOOD PRESSURE: 176 MMHG | DIASTOLIC BLOOD PRESSURE: 52 MMHG | OXYGEN SATURATION: 91 % | HEART RATE: 55 BPM | HEIGHT: 60 IN | BODY MASS INDEX: 28.13 KG/M2

## 2018-01-01 VITALS
OXYGEN SATURATION: 95 % | RESPIRATION RATE: 18 BRPM | BODY MASS INDEX: 27.09 KG/M2 | DIASTOLIC BLOOD PRESSURE: 41 MMHG | HEART RATE: 52 BPM | HEIGHT: 60 IN | TEMPERATURE: 98 F | SYSTOLIC BLOOD PRESSURE: 124 MMHG | WEIGHT: 138 LBS

## 2018-01-01 VITALS
DIASTOLIC BLOOD PRESSURE: 48 MMHG | RESPIRATION RATE: 18 BRPM | HEART RATE: 50 BPM | OXYGEN SATURATION: 96 % | SYSTOLIC BLOOD PRESSURE: 132 MMHG | TEMPERATURE: 98 F

## 2018-01-01 VITALS
DIASTOLIC BLOOD PRESSURE: 56 MMHG | HEART RATE: 51 BPM | DIASTOLIC BLOOD PRESSURE: 50 MMHG | SYSTOLIC BLOOD PRESSURE: 131 MMHG | SYSTOLIC BLOOD PRESSURE: 130 MMHG | HEART RATE: 54 BPM

## 2018-01-01 DIAGNOSIS — Z98.890 POST-OPERATIVE STATE: Primary | ICD-10-CM

## 2018-01-01 DIAGNOSIS — H25.13 NUCLEAR SCLEROSIS OF BOTH EYES: Primary | ICD-10-CM

## 2018-01-01 DIAGNOSIS — H52.7 REFRACTIVE ERROR: ICD-10-CM

## 2018-01-01 DIAGNOSIS — E11.9 DM TYPE 2 WITHOUT RETINOPATHY: ICD-10-CM

## 2018-01-01 DIAGNOSIS — Z00.00 ANNUAL PHYSICAL EXAM: Primary | ICD-10-CM

## 2018-01-01 DIAGNOSIS — H26.9 CORTICAL CATARACT OF BOTH EYES: ICD-10-CM

## 2018-01-01 DIAGNOSIS — H25.12 NUCLEAR SCLEROSIS, LEFT: ICD-10-CM

## 2018-01-01 DIAGNOSIS — H25.11 NS (NUCLEAR SCLEROSIS), RIGHT: Primary | ICD-10-CM

## 2018-01-01 DIAGNOSIS — H25.10 SENILE NUCLEAR SCLEROSIS: ICD-10-CM

## 2018-01-01 DIAGNOSIS — H25.12 NUCLEAR SCLEROSIS, LEFT: Primary | ICD-10-CM

## 2018-01-01 LAB
POCT GLUCOSE: 161 MG/DL (ref 70–110)
POCT GLUCOSE: 167 MG/DL (ref 70–110)
POCT GLUCOSE: 190 MG/DL (ref 70–110)
POCT GLUCOSE: 193 MG/DL (ref 70–110)

## 2018-01-01 PROCEDURE — 99999 PR PBB SHADOW E&M-EST. PATIENT-LVL II: CPT | Mod: PBBFAC,,, | Performed by: OPHTHALMOLOGY

## 2018-01-01 PROCEDURE — 82962 GLUCOSE BLOOD TEST: CPT | Mod: 91 | Performed by: OPHTHALMOLOGY

## 2018-01-01 PROCEDURE — 63600175 PHARM REV CODE 636 W HCPCS: Performed by: OPHTHALMOLOGY

## 2018-01-01 PROCEDURE — 92136 OPHTHALMIC BIOMETRY: CPT | Mod: RT,S$GLB,, | Performed by: OPHTHALMOLOGY

## 2018-01-01 PROCEDURE — 99999 PR PBB SHADOW E&M-EST. PATIENT-LVL III: CPT | Mod: PBBFAC,GC,, | Performed by: STUDENT IN AN ORGANIZED HEALTH CARE EDUCATION/TRAINING PROGRAM

## 2018-01-01 PROCEDURE — 82962 GLUCOSE BLOOD TEST: CPT | Performed by: OPHTHALMOLOGY

## 2018-01-01 PROCEDURE — 71000015 HC POSTOP RECOV 1ST HR: Performed by: OPHTHALMOLOGY

## 2018-01-01 PROCEDURE — 36000707: Performed by: OPHTHALMOLOGY

## 2018-01-01 PROCEDURE — 99999 PR PBB SHADOW E&M-EST. PATIENT-LVL III: CPT | Mod: PBBFAC,,, | Performed by: OPHTHALMOLOGY

## 2018-01-01 PROCEDURE — D9220A PRA ANESTHESIA: Mod: ANES,,, | Performed by: ANESTHESIOLOGY

## 2018-01-01 PROCEDURE — 99024 POSTOP FOLLOW-UP VISIT: CPT | Mod: S$GLB,,, | Performed by: OPHTHALMOLOGY

## 2018-01-01 PROCEDURE — 92136 OPHTHALMIC BIOMETRY: CPT | Mod: 26,LT,S$GLB, | Performed by: OPHTHALMOLOGY

## 2018-01-01 PROCEDURE — 37000009 HC ANESTHESIA EA ADD 15 MINS: Performed by: OPHTHALMOLOGY

## 2018-01-01 PROCEDURE — D9220A PRA ANESTHESIA: Mod: CRNA,,, | Performed by: NURSE ANESTHETIST, CERTIFIED REGISTERED

## 2018-01-01 PROCEDURE — 37000008 HC ANESTHESIA 1ST 15 MINUTES: Performed by: OPHTHALMOLOGY

## 2018-01-01 PROCEDURE — 99024 POSTOP FOLLOW-UP VISIT: CPT | Mod: ,,, | Performed by: OPHTHALMOLOGY

## 2018-01-01 PROCEDURE — 36000706: Performed by: OPHTHALMOLOGY

## 2018-01-01 PROCEDURE — 99213 OFFICE O/P EST LOW 20 MIN: CPT | Mod: GC,S$GLB,, | Performed by: STUDENT IN AN ORGANIZED HEALTH CARE EDUCATION/TRAINING PROGRAM

## 2018-01-01 PROCEDURE — 25000003 PHARM REV CODE 250: Performed by: OPHTHALMOLOGY

## 2018-01-01 PROCEDURE — 71000033 HC RECOVERY, INTIAL HOUR: Performed by: OPHTHALMOLOGY

## 2018-01-01 PROCEDURE — 63600175 PHARM REV CODE 636 W HCPCS: Performed by: NURSE ANESTHETIST, CERTIFIED REGISTERED

## 2018-01-01 PROCEDURE — 66984 XCAPSL CTRC RMVL W/O ECP: CPT | Mod: 79,LT,, | Performed by: OPHTHALMOLOGY

## 2018-01-01 PROCEDURE — V2632 POST CHMBR INTRAOCULAR LENS: HCPCS | Performed by: OPHTHALMOLOGY

## 2018-01-01 PROCEDURE — 99212 OFFICE O/P EST SF 10 MIN: CPT | Mod: PBBFAC,PO | Performed by: OPHTHALMOLOGY

## 2018-01-01 PROCEDURE — 92004 COMPRE OPH EXAM NEW PT 1/>: CPT | Mod: S$GLB,,, | Performed by: OPHTHALMOLOGY

## 2018-01-01 PROCEDURE — 66984 XCAPSL CTRC RMVL W/O ECP: CPT | Mod: RT,,, | Performed by: OPHTHALMOLOGY

## 2018-01-01 DEVICE — LENS 21.0 ACRYSOF: Type: IMPLANTABLE DEVICE | Site: EYE | Status: FUNCTIONAL

## 2018-01-01 DEVICE — LENS 22.0: Type: IMPLANTABLE DEVICE | Site: EYE | Status: FUNCTIONAL

## 2018-01-01 RX ORDER — DIFLUPREDNATE OPHTHALMIC 0.5 MG/ML
1 EMULSION OPHTHALMIC 4 TIMES DAILY
Qty: 5 ML | Refills: 1 | Status: SHIPPED | OUTPATIENT
Start: 2018-01-01 | End: 2018-01-01

## 2018-01-01 RX ORDER — EPINEPHRINE 1 MG/ML
INJECTION, SOLUTION INTRACARDIAC; INTRAMUSCULAR; INTRAVENOUS; SUBCUTANEOUS
Status: DISCONTINUED
Start: 2018-01-01 | End: 2018-01-01 | Stop reason: HOSPADM

## 2018-01-01 RX ORDER — HYDROCODONE BITARTRATE AND ACETAMINOPHEN 5; 325 MG/1; MG/1
1 TABLET ORAL EVERY 4 HOURS PRN
Status: DISCONTINUED | OUTPATIENT
Start: 2018-01-01 | End: 2018-01-01 | Stop reason: HOSPADM

## 2018-01-01 RX ORDER — MIDAZOLAM HYDROCHLORIDE 1 MG/ML
INJECTION, SOLUTION INTRAMUSCULAR; INTRAVENOUS
Status: DISCONTINUED | OUTPATIENT
Start: 2018-01-01 | End: 2018-01-01

## 2018-01-01 RX ORDER — PREDNISOLONE ACETATE 10 MG/ML
SUSPENSION/ DROPS OPHTHALMIC
Status: DISCONTINUED
Start: 2018-01-01 | End: 2018-01-01 | Stop reason: HOSPADM

## 2018-01-01 RX ORDER — SODIUM CHLORIDE 0.9 % (FLUSH) 0.9 %
3 SYRINGE (ML) INJECTION
Status: DISCONTINUED | OUTPATIENT
Start: 2018-01-01 | End: 2018-01-01 | Stop reason: HOSPADM

## 2018-01-01 RX ORDER — ACETAMINOPHEN 500 MG
1 TABLET ORAL EVERY MORNING
COMMUNITY
End: 2019-01-01 | Stop reason: HOSPADM

## 2018-01-01 RX ORDER — EPINEPHRINE 1 MG/ML
INJECTION, SOLUTION INTRACARDIAC; INTRAMUSCULAR; INTRAVENOUS; SUBCUTANEOUS
Status: DISCONTINUED | OUTPATIENT
Start: 2018-01-01 | End: 2018-01-01 | Stop reason: HOSPADM

## 2018-01-01 RX ORDER — LIDOCAINE HYDROCHLORIDE 20 MG/ML
INJECTION, SOLUTION EPIDURAL; INFILTRATION; INTRACAUDAL; PERINEURAL
Status: DISCONTINUED | OUTPATIENT
Start: 2018-01-01 | End: 2018-01-01 | Stop reason: HOSPADM

## 2018-01-01 RX ORDER — OFLOXACIN 3 MG/ML
1 SOLUTION/ DROPS OPHTHALMIC
Status: COMPLETED | OUTPATIENT
Start: 2018-01-01 | End: 2018-01-01

## 2018-01-01 RX ORDER — LIDOCAINE HYDROCHLORIDE 10 MG/ML
1 INJECTION, SOLUTION EPIDURAL; INFILTRATION; INTRACAUDAL; PERINEURAL ONCE
Status: COMPLETED | OUTPATIENT
Start: 2018-01-01 | End: 2018-01-01

## 2018-01-01 RX ORDER — CYCLOPENTOLATE HYDROCHLORIDE 10 MG/ML
1 SOLUTION/ DROPS OPHTHALMIC
Status: DISPENSED | OUTPATIENT
Start: 2018-01-01

## 2018-01-01 RX ORDER — PHENYLEPHRINE HYDROCHLORIDE 25 MG/ML
1 SOLUTION/ DROPS OPHTHALMIC
Status: DISPENSED | OUTPATIENT
Start: 2018-01-01

## 2018-01-01 RX ORDER — PROPARACAINE HYDROCHLORIDE 5 MG/ML
1 SOLUTION/ DROPS OPHTHALMIC
Status: DISPENSED | OUTPATIENT
Start: 2018-01-01

## 2018-01-01 RX ORDER — PROPARACAINE HYDROCHLORIDE 5 MG/ML
1 SOLUTION/ DROPS OPHTHALMIC
Status: DISCONTINUED | OUTPATIENT
Start: 2018-01-01 | End: 2018-01-01 | Stop reason: HOSPADM

## 2018-01-01 RX ORDER — PREDNISOLONE ACETATE 10 MG/ML
SUSPENSION/ DROPS OPHTHALMIC
Status: DISCONTINUED | OUTPATIENT
Start: 2018-01-01 | End: 2018-01-01 | Stop reason: HOSPADM

## 2018-01-01 RX ORDER — ACETAMINOPHEN 325 MG/1
650 TABLET ORAL EVERY 4 HOURS PRN
Status: DISCONTINUED | OUTPATIENT
Start: 2018-01-01 | End: 2018-01-01 | Stop reason: HOSPADM

## 2018-01-01 RX ORDER — SODIUM CHLORIDE 9 MG/ML
INJECTION, SOLUTION INTRAVENOUS CONTINUOUS
Status: ACTIVE | OUTPATIENT
Start: 2018-01-01

## 2018-01-01 RX ORDER — TROPICAMIDE 10 MG/ML
1 SOLUTION/ DROPS OPHTHALMIC
Status: DISPENSED | OUTPATIENT
Start: 2018-01-01

## 2018-01-01 RX ORDER — LIDOCAINE HYDROCHLORIDE 20 MG/ML
INJECTION, SOLUTION EPIDURAL; INFILTRATION; INTRACAUDAL; PERINEURAL
Status: DISCONTINUED
Start: 2018-01-01 | End: 2018-01-01 | Stop reason: HOSPADM

## 2018-01-01 RX ORDER — MEPERIDINE HYDROCHLORIDE 25 MG/ML
12.5 INJECTION INTRAMUSCULAR; INTRAVENOUS; SUBCUTANEOUS ONCE AS NEEDED
Status: DISCONTINUED | OUTPATIENT
Start: 2018-01-01 | End: 2018-01-01 | Stop reason: HOSPADM

## 2018-01-01 RX ORDER — SODIUM CHLORIDE 9 MG/ML
INJECTION, SOLUTION INTRAVENOUS CONTINUOUS
Status: DISCONTINUED | OUTPATIENT
Start: 2018-01-01 | End: 2018-01-01 | Stop reason: HOSPADM

## 2018-01-01 RX ORDER — CYCLOPENTOLATE HYDROCHLORIDE 10 MG/ML
1 SOLUTION/ DROPS OPHTHALMIC
Status: COMPLETED | OUTPATIENT
Start: 2018-01-01 | End: 2018-01-01

## 2018-01-01 RX ORDER — OFLOXACIN 3 MG/ML
SOLUTION/ DROPS OPHTHALMIC
Status: DISCONTINUED | OUTPATIENT
Start: 2018-01-01 | End: 2018-01-01 | Stop reason: HOSPADM

## 2018-01-01 RX ORDER — FENTANYL CITRATE 50 UG/ML
INJECTION, SOLUTION INTRAMUSCULAR; INTRAVENOUS
Status: DISCONTINUED | OUTPATIENT
Start: 2018-01-01 | End: 2018-01-01

## 2018-01-01 RX ORDER — OFLOXACIN 3 MG/ML
1 SOLUTION/ DROPS OPHTHALMIC 4 TIMES DAILY
Qty: 5 ML | Refills: 1 | Status: SHIPPED | OUTPATIENT
Start: 2018-01-01 | End: 2018-01-01

## 2018-01-01 RX ORDER — NEPAFENAC 3 MG/ML
1 SUSPENSION/ DROPS OPHTHALMIC DAILY
Qty: 3 ML | Refills: 1 | Status: SHIPPED | OUTPATIENT
Start: 2018-01-01 | End: 2018-01-01

## 2018-01-01 RX ORDER — TROPICAMIDE 10 MG/ML
1 SOLUTION/ DROPS OPHTHALMIC
Status: DISCONTINUED | OUTPATIENT
Start: 2018-01-01 | End: 2018-01-01 | Stop reason: HOSPADM

## 2018-01-01 RX ORDER — PHENYLEPHRINE HYDROCHLORIDE 25 MG/ML
1 SOLUTION/ DROPS OPHTHALMIC
Status: DISCONTINUED | OUTPATIENT
Start: 2018-01-01 | End: 2018-01-01 | Stop reason: HOSPADM

## 2018-01-01 RX ADMIN — SODIUM CHLORIDE 500 ML: 0.9 INJECTION, SOLUTION INTRAVENOUS at 09:08

## 2018-01-01 RX ADMIN — OFLOXACIN 1 DROP: 3 SOLUTION OPHTHALMIC at 08:08

## 2018-01-01 RX ADMIN — OFLOXACIN 1 DROP: 3 SOLUTION OPHTHALMIC at 09:08

## 2018-01-01 RX ADMIN — FENTANYL CITRATE 50 MCG: 50 INJECTION, SOLUTION INTRAMUSCULAR; INTRAVENOUS at 10:08

## 2018-01-01 RX ADMIN — CYCLOPENTOLATE HYDROCHLORIDE 1 DROP: 10 SOLUTION/ DROPS OPHTHALMIC at 08:08

## 2018-01-01 RX ADMIN — PHENYLEPHRINE HYDROCHLORIDE 1 DROP: 25 SOLUTION/ DROPS OPHTHALMIC at 08:08

## 2018-01-01 RX ADMIN — LIDOCAINE HYDROCHLORIDE 5 ML: 20 INJECTION, SOLUTION EPIDURAL; INFILTRATION; INTRACAUDAL; PERINEURAL at 10:08

## 2018-01-01 RX ADMIN — TROPICAMIDE 1 DROP: 10 SOLUTION/ DROPS OPHTHALMIC at 08:08

## 2018-01-01 RX ADMIN — FENTANYL CITRATE 25 MCG: 50 INJECTION, SOLUTION INTRAMUSCULAR; INTRAVENOUS at 10:08

## 2018-01-01 RX ADMIN — PHENYLEPHRINE HYDROCHLORIDE 1 DROP: 25 SOLUTION/ DROPS OPHTHALMIC at 09:08

## 2018-01-01 RX ADMIN — OFLOXACIN 2 DROP: 3 SOLUTION/ DROPS OPHTHALMIC at 10:08

## 2018-01-01 RX ADMIN — TROPICAMIDE 1 DROP: 10 SOLUTION/ DROPS OPHTHALMIC at 09:08

## 2018-01-01 RX ADMIN — CYCLOPENTOLATE HYDROCHLORIDE 1 DROP: 10 SOLUTION/ DROPS OPHTHALMIC at 09:08

## 2018-01-01 RX ADMIN — EPINEPHRINE 0.3 MG: 1 INJECTION, SOLUTION INTRAMUSCULAR; SUBCUTANEOUS at 10:08

## 2018-01-01 RX ADMIN — SODIUM CHLORIDE 250 ML: 0.9 INJECTION, SOLUTION INTRAVENOUS at 09:08

## 2018-01-01 RX ADMIN — MIDAZOLAM HYDROCHLORIDE 2 MG: 1 INJECTION, SOLUTION INTRAMUSCULAR; INTRAVENOUS at 09:08

## 2018-01-01 RX ADMIN — PREDNISOLONE ACETATE 2 DROP: 10 SUSPENSION/ DROPS OPHTHALMIC at 10:08

## 2018-01-01 RX ADMIN — PROPARACAINE HYDROCHLORIDE 1 DROP: 5 SOLUTION/ DROPS OPHTHALMIC at 08:08

## 2018-01-01 RX ADMIN — SODIUM CHONDROITIN SULFATE / SODIUM HYALURONATE 1.05 ML: 0.55-0.5 INJECTION INTRAOCULAR at 10:08

## 2018-01-01 RX ADMIN — LIDOCAINE HYDROCHLORIDE 2 MG: 10 INJECTION, SOLUTION EPIDURAL; INFILTRATION; INTRACAUDAL; PERINEURAL at 09:08

## 2018-01-01 RX ADMIN — MIDAZOLAM HYDROCHLORIDE 1 MG: 1 INJECTION, SOLUTION INTRAMUSCULAR; INTRAVENOUS at 10:08

## 2018-01-19 NOTE — PROGRESS NOTES
Ms. Salcedo is a patient of Dr. Salas and was last seen in [facility] 7/5/2017.      Subjective:   Patient ID:  Beth Salcedo is a 72 y.o. female who presents for follow-up of Hypertension (6 month f/u )  .   HPI:    Ms. Salcedo is a 71yo female with ESRD on dialysis (started June 2016 MWF), HTN, HLD, pulmonary HTN, aortic atherosclerosis, DMI, anemia of ESRD, and statin intolerance here for follow up. She reports MENDEZ on days when she knows she has eaten a high salt meal (crawfish, charbroiled oysters) and right before dialysis. Ms. Salcedo denies chest pain with exertion or at rest, palpitations, dizziness, syncope, leg edema, claudication, PND, or orthopnea.  She has questions regarding her pulmonary hypertension, which was cited as a reason that she was unable to obtain access for peritoneal dialysis in September. She says her surgery was also canceled because her oxygen sats were in the 70s, although she says she is in the upper 80s on her home pulse oximeter in recent months.      She is currently taking ASA 81mg for CAD management. She has a history of statin intolerance. .2 on 6/28/2017. She is also taking amlodipine 10mg, carvedilol 12.5mg BID, clonidine patch 0.3mg weekly, and losartan 100mg.  BPs are labile, ranging from the 130s to over 200 systolic per clinic measures. Creatinine is 7.2 on 9/2/2017. K is 4.9. Hepatic transaminases are within normal limits. HA1C is 6. She has chronic anemia which is stable. Oxygen saturation is 91%.    Recent Cardiac Tests:    2D Echo (8/27/2017):  CONCLUSIONS     1 - Severe left atrial enlargement.     2 - Moderate right atrial enlargement.     3 - Eccentric hypertrophy.     4 - Normal left ventricular systolic function (EF 60-65%).     5 - Right ventricular enlargement with mildly depressed systolic function.     6 - Moderate tricuspid regurgitation.     7 - Pulmonary hypertension. The estimated PA systolic pressure is 80 mmHg.     8  - Trivial pericardial effusion.     Current Outpatient Prescriptions   Medication Sig    amlodipine (NORVASC) 10 MG tablet Take 1 tablet (10 mg total) by mouth once daily.    aspirin 81 mg Tab Take 1 tablet by mouth Daily.    blood sugar diagnostic Strp Test 3 times daily. Please dispense 1 meter covered by insurance. Meter, Strips, and lancets    carvedilol (COREG) 12.5 MG tablet Take 1 tablet (12.5 mg total) by mouth 2 (two) times daily.    cloNIDine 0.3 mg/24 hr td ptwk (CATAPRES) 0.3 mg/24 hr Place 1 patch onto the skin once a week.     docusate sodium (COLACE) 50 MG capsule Take 1 capsule (50 mg total) by mouth 2 (two) times daily. (Patient taking differently: Take 50 mg by mouth as needed. )    losartan (COZAAR) 100 MG tablet Take 1 tablet (100 mg total) by mouth once daily.    VITAMIN D2 50,000 unit capsule ONE EVERY WEEK (Patient taking differently: PT TAKEN EVERY TUES,THUR ,SAT)    sevelamer carbonate (RENVELA) 800 mg Tab Take 2 tablets (1,600 mg total) by mouth 3 (three) times daily with meals.     No current facility-administered medications for this visit.      Review of Systems   Constitution: Negative for malaise/fatigue.   Eyes: Negative for blurred vision.   Cardiovascular: Positive for dyspnea on exertion. Negative for chest pain, claudication, irregular heartbeat, leg swelling, orthopnea, palpitations, paroxysmal nocturnal dyspnea and syncope.   Respiratory: Negative for shortness of breath.    Hematologic/Lymphatic: Negative for bleeding problem.   Skin: Negative for rash.   Musculoskeletal: Negative for myalgias.   Gastrointestinal: Negative for abdominal pain, constipation, diarrhea and nausea.   Genitourinary: Negative for hematuria.   Neurological: Negative for headaches, loss of balance and numbness.   Psychiatric/Behavioral: Negative for altered mental status.   Allergic/Immunologic: Negative for persistent infections.     Objective:     Right Arm BP - Sittin/101 (18  0937)    BP (!) 162/101 (BP Location: Right arm, Patient Position: Sitting, BP Method: Medium (Automatic))   Pulse 64   Ht 5' (1.524 m)   Wt 64 kg (141 lb 1.5 oz)   LMP  (LMP Unknown)   BMI 27.56 kg/m²     Physical Exam   Constitutional: She is oriented to person, place, and time. She appears well-developed and well-nourished.   HENT:   Head: Normocephalic.   Nose: Nose normal.   Eyes: Pupils are equal, round, and reactive to light.   Neck: JVD (v waves noted) present. Carotid bruit is not present.   Cardiovascular: Normal rate, regular rhythm, S1 normal and S2 normal.  Exam reveals no gallop.    Murmur heard.   Harsh midsystolic murmur is present with a grade of 3/6  at the upper right sternal border, upper left sternal border  Pulses:       Carotid pulses are 2+ on the right side, and 2+ on the left side.       Radial pulses are 2+ on the right side, and 2+ on the left side.   Left femoral AVG.   Pulmonary/Chest: Breath sounds normal. No respiratory distress.   Abdominal: Soft. Bowel sounds are normal. She exhibits no distension. There is no tenderness.   Musculoskeletal: Normal range of motion. She exhibits no edema.   Neurological: She is alert and oriented to person, place, and time.   Skin: Skin is warm and dry. No erythema.   Psychiatric: She has a normal mood and affect. Her speech is normal and behavior is normal.   Nursing note and vitals reviewed.    Lab Results   Component Value Date     (L) 09/02/2017    K 4.9 09/02/2017    MG 2.3 09/02/2017     09/02/2017    CO2 23 09/02/2017    BUN 61 (H) 09/02/2017    CREATININE 7.2 (H) 09/02/2017     (H) 09/02/2017    HGBA1C 6.0 (H) 08/30/2017    AST 24 08/26/2017    ALT 49 (H) 08/26/2017    ALBUMIN 3.6 08/26/2017    PROT 8.0 08/26/2017    BILITOT 0.7 08/26/2017    WBC 6.65 08/31/2017    HGB 10.8 (L) 08/31/2017    HCT 32.9 (L) 08/31/2017    HCT 36 02/20/2017    MCV 93 08/31/2017     08/31/2017    TSH 3.860 12/14/2016    CHOL 180  06/28/2017    HDL 46 06/28/2017    LDLCALC 114.2 06/28/2017    TRIG 99 06/28/2017         Recent Labs  Lab 01/29/16  0853 06/04/16  1821 06/12/16  0342 07/11/16  1104   INR 1.0 1.0 1.0 1.1        Test(s) Reviewed  I have reviewed the following in detail:  [] Stress test   [] Angiography   [] Echocardiogram   [x] Labs   [] Other:         Assessment:         1. Pulmonary hypertension. PASP 80mmHg, unchanged from one year prior. Discussed factors contributing to PH, including fluid overload and hypertension. Will increase carvedilol to 25 BID and discussed importance of low salt diet. Discussed referral to PH clinic but patient declined at this time. Dr. Chase had CXR and PFTs/DLCO ordered that have not been scheduled. Will ensure testing is completed.     2. (HFpEF) heart failure with preserved ejection fraction. There are no signs of significant fluid overload on clinical exam. However, discussed importance of salt restriction and keeping fluid off on non-dialysis days.     3. Essential hypertension. Elevated. Increasing carvedilol.     4. Mixed hyperlipidemia. . Statin intolerant.     5. Moderate tricuspid regurgitation. V waves noted.     6. Type 2 diabetes mellitus with stage 5 chronic kidney disease and hypertension. Controlled. HA1C 6.     7. Venous insufficiency of both lower extremities. Asymptomatic.     8. ESRD (end stage renal disease) on dialysis. MWF.     9. Anemia of chronic renal failure, stage 5. Stable.     Plan:     Beth was seen today for hypertension.    Diagnoses and all orders for this visit:    Pulmonary hypertension  -     carvedilol (COREG) 25 MG tablet; Take 1 tablet (25 mg total) by mouth 2 (two) times daily.    (HFpEF) heart failure with preserved ejection fraction  -     carvedilol (COREG) 25 MG tablet; Take 1 tablet (25 mg total) by mouth 2 (two) times daily.    Essential hypertension  -     carvedilol (COREG) 25 MG tablet; Take 1 tablet (25 mg total) by mouth 2 (two) times  daily.    Mixed hyperlipidemia    Moderate tricuspid regurgitation    Type 2 diabetes mellitus with stage 5 chronic kidney disease and hypertension    Venous insufficiency of both lower extremities    Statin intolerance    ESRD (end stage renal disease) on dialysis    Anemia of chronic renal failure, stage 5      Increase carvedilol to 25mg twice a day.  Continue other medications.  Schedule lung function tests and chest x ray.  Low salt diet.  Patient has been encouraged to exercise a minimum of 30 minutes daily, 5 times per week. Walking strongly encouraged.  Return to clinic in 3 months.    Follow-up in about 3 months (around 4/23/2018).    ------------------------------------------------------------------    RICK Colvin, NP-C  Consult Cardiology

## 2018-01-23 ENCOUNTER — OFFICE VISIT (OUTPATIENT)
Dept: CARDIOLOGY | Facility: CLINIC | Age: 73
End: 2018-01-23
Payer: MEDICARE

## 2018-01-23 VITALS
SYSTOLIC BLOOD PRESSURE: 162 MMHG | WEIGHT: 141.13 LBS | HEIGHT: 60 IN | HEART RATE: 64 BPM | DIASTOLIC BLOOD PRESSURE: 101 MMHG | BODY MASS INDEX: 27.71 KG/M2

## 2018-01-23 DIAGNOSIS — I07.1 MODERATE TRICUSPID REGURGITATION: ICD-10-CM

## 2018-01-23 DIAGNOSIS — I12.0 TYPE 2 DIABETES MELLITUS WITH STAGE 5 CHRONIC KIDNEY DISEASE AND HYPERTENSION: ICD-10-CM

## 2018-01-23 DIAGNOSIS — I27.20 PULMONARY HYPERTENSION: Primary | ICD-10-CM

## 2018-01-23 DIAGNOSIS — E11.22 TYPE 2 DIABETES MELLITUS WITH STAGE 5 CHRONIC KIDNEY DISEASE AND HYPERTENSION: ICD-10-CM

## 2018-01-23 DIAGNOSIS — N18.6 ESRD (END STAGE RENAL DISEASE) ON DIALYSIS: Chronic | ICD-10-CM

## 2018-01-23 DIAGNOSIS — N18.5 TYPE 2 DIABETES MELLITUS WITH STAGE 5 CHRONIC KIDNEY DISEASE AND HYPERTENSION: ICD-10-CM

## 2018-01-23 DIAGNOSIS — I87.2 VENOUS INSUFFICIENCY OF BOTH LOWER EXTREMITIES: ICD-10-CM

## 2018-01-23 DIAGNOSIS — D63.1 ANEMIA OF CHRONIC RENAL FAILURE, STAGE 5: Chronic | ICD-10-CM

## 2018-01-23 DIAGNOSIS — Z99.2 ESRD (END STAGE RENAL DISEASE) ON DIALYSIS: Chronic | ICD-10-CM

## 2018-01-23 DIAGNOSIS — E78.2 MIXED HYPERLIPIDEMIA: ICD-10-CM

## 2018-01-23 DIAGNOSIS — I50.30 (HFPEF) HEART FAILURE WITH PRESERVED EJECTION FRACTION: ICD-10-CM

## 2018-01-23 DIAGNOSIS — Z78.9 STATIN INTOLERANCE: ICD-10-CM

## 2018-01-23 DIAGNOSIS — I10 ESSENTIAL HYPERTENSION: Chronic | ICD-10-CM

## 2018-01-23 DIAGNOSIS — N18.5 ANEMIA OF CHRONIC RENAL FAILURE, STAGE 5: Chronic | ICD-10-CM

## 2018-01-23 PROCEDURE — 99999 PR PBB SHADOW E&M-EST. PATIENT-LVL III: CPT | Mod: PBBFAC,,, | Performed by: NURSE PRACTITIONER

## 2018-01-23 PROCEDURE — 99499 UNLISTED E&M SERVICE: CPT | Mod: S$GLB,,, | Performed by: NURSE PRACTITIONER

## 2018-01-23 PROCEDURE — 99214 OFFICE O/P EST MOD 30 MIN: CPT | Mod: S$GLB,,, | Performed by: NURSE PRACTITIONER

## 2018-01-23 RX ORDER — CARVEDILOL 25 MG/1
25 TABLET ORAL 2 TIMES DAILY
Qty: 60 TABLET | Refills: 11 | Status: SHIPPED | OUTPATIENT
Start: 2018-01-23 | End: 2019-01-01 | Stop reason: SDUPTHER

## 2018-01-23 NOTE — PATIENT INSTRUCTIONS
Increase carvedilol to 25mg twice a day.  Continue other medications.  Schedule lung function tests and chest x ray.  Low salt diet.  Patient has been encouraged to exercise a minimum of 30 minutes daily, 5 times per week. Walking strongly encouraged.  Return to clinic in 3 months.

## 2018-01-30 ENCOUNTER — HOSPITAL ENCOUNTER (OUTPATIENT)
Dept: PULMONOLOGY | Facility: CLINIC | Age: 73
Discharge: HOME | End: 2018-01-30
Payer: MEDICARE

## 2018-01-30 ENCOUNTER — HOSPITAL ENCOUNTER (OUTPATIENT)
Dept: RADIOLOGY | Facility: HOSPITAL | Age: 73
Discharge: HOME | End: 2018-01-30
Attending: INTERNAL MEDICINE
Payer: MEDICARE

## 2018-01-30 DIAGNOSIS — I27.20 PULMONARY HYPERTENSION: ICD-10-CM

## 2018-01-30 DIAGNOSIS — J90 PLEURAL EFFUSION: ICD-10-CM

## 2018-01-30 LAB
PRE FEV1 FVC: 70
PRE FEV1: 0.61
PRE FVC: 0.87
PREDICTED FEV1 FVC: 80
PREDICTED FEV1: 1.82
PREDICTED FVC: 2.35

## 2018-01-30 PROCEDURE — 71046 X-RAY EXAM CHEST 2 VIEWS: CPT | Mod: 26,,, | Performed by: RADIOLOGY

## 2018-01-30 PROCEDURE — 94010 BREATHING CAPACITY TEST: CPT | Mod: S$GLB,,, | Performed by: INTERNAL MEDICINE

## 2018-01-30 PROCEDURE — 94729 DIFFUSING CAPACITY: CPT | Mod: 53,S$GLB,, | Performed by: INTERNAL MEDICINE

## 2018-01-30 PROCEDURE — 71046 X-RAY EXAM CHEST 2 VIEWS: CPT | Mod: TC

## 2018-02-03 DIAGNOSIS — I16.0 HYPERTENSIVE URGENCY: ICD-10-CM

## 2018-02-05 RX ORDER — LOSARTAN POTASSIUM 100 MG/1
TABLET ORAL
Refills: 0 | OUTPATIENT
Start: 2018-02-05

## 2018-06-04 RX ORDER — AMLODIPINE BESYLATE 10 MG/1
TABLET ORAL
Qty: 90 TABLET | Refills: 3 | Status: SHIPPED | OUTPATIENT
Start: 2018-06-04 | End: 2019-01-01 | Stop reason: SDUPTHER

## 2018-06-07 DIAGNOSIS — I16.0 HYPERTENSIVE URGENCY: ICD-10-CM

## 2018-06-07 RX ORDER — LOSARTAN POTASSIUM 100 MG/1
TABLET ORAL
Qty: 30 TABLET | Refills: 90 | Status: SHIPPED | OUTPATIENT
Start: 2018-06-07 | End: 2018-06-22 | Stop reason: SDUPTHER

## 2018-06-22 DIAGNOSIS — I16.0 HYPERTENSIVE URGENCY: ICD-10-CM

## 2018-06-27 RX ORDER — LOSARTAN POTASSIUM 100 MG/1
TABLET ORAL
Qty: 90 TABLET | Refills: 3 | Status: SHIPPED | OUTPATIENT
Start: 2018-06-27 | End: 2019-01-01 | Stop reason: HOSPADM

## 2018-07-10 NOTE — PROGRESS NOTES
[facility]  CLINIC NOTE    Patient Name: Beth Salcedo  YOB: 1945    PRESENTING HISTORY       History of Present Illness:  Ms. Beth Salcedo is a 73 y.o. female w/ CKD V-D on MWF HD, HTN, DM2 (diet-controlled), HLD with statin intolerance who presents for annual exam. Pt's PCP is Dr. Sanchez but pt wanted to be seen earlier; she says she has generally been well.    She says that she has a long history of white-coat hypertension and that her blood pressure at HD is usually 130s-140s systolic. She says she takes all of her BP meds daily and took them today.    Pt says she had a colonoscopy 15-20 years ago and that it was normal; declines f/u c-scope or FOBT today. She last had a mammogram 4 years ago which was BI-RADS 1. She has not had a mammogram since and declines one today. She last had a DXA today without evidence of osteopenia or osteoporosis; in light of her ESRD will not pursue as she has not had any falls/fragility fractures/bone pain or other signs of BMD.    Review of Systems   Constitutional: Negative for chills, fever and weight loss.   Eyes: Negative for blurred vision.   Respiratory: Negative for cough.    Cardiovascular: Negative for chest pain.   Gastrointestinal: Negative for nausea and vomiting.   Genitourinary: Negative for dysuria and hematuria.   Musculoskeletal: Negative for joint pain and myalgias.   Skin: Negative for itching and rash.   Neurological: Negative for dizziness and headaches.   Endo/Heme/Allergies: Does not bruise/bleed easily.         PAST HISTORY:     Past Medical History:   Diagnosis Date    Anemia of chronic renal failure, stage 5     Chronic kidney disease     Diabetes mellitus type II     Diastolic heart failure     ESRD on dialysis since 6/10/16     Essential hypertension     Hyperlipidemia     Hypertension     LVH (left ventricular hypertrophy)     Secondary hyperparathyroidism of renal  origin        Past Surgical History:   Procedure Laterality Date    ADRENAL GLAND SURGERY  1990s    removed for tumor which she states was benign     HYSTERECTOMY  1990s    removed for tumor which she states was benign     permcath placement         Family History   Problem Relation Age of Onset    Pneumonia Brother     Diabetes Paternal Aunt     Kidney disease Neg Hx        Social History     Social History    Marital status:      Spouse name: N/A    Number of children: N/A    Years of education: N/A     Social History Main Topics    Smoking status: Never Smoker    Smokeless tobacco: Never Used    Alcohol use No      Comment: quit since she started dialysis; previously was only having wine occasionally and in her younger years would have whiskey/soda, etc    Drug use: No    Sexual activity: Not Asked     Other Topics Concern    None     Social History Narrative    Lives with daughter (born in 1964)    Owns and operates day care business     Previously was a         MEDICATIONS & ALLERGIES:     Current Outpatient Prescriptions on File Prior to Visit   Medication Sig    amLODIPine (NORVASC) 10 MG tablet TAKE ONE DAILY    aspirin 81 mg Tab Take 1 tablet by mouth Daily.    blood sugar diagnostic Strp Test 3 times daily. Please dispense 1 meter covered by insurance. Meter, Strips, and lancets    carvedilol (COREG) 25 MG tablet Take 1 tablet (25 mg total) by mouth 2 (two) times daily.    docusate sodium (COLACE) 50 MG capsule Take 1 capsule (50 mg total) by mouth 2 (two) times daily. (Patient taking differently: Take 50 mg by mouth as needed. )    losartan (COZAAR) 100 MG tablet TAKE ONE BY MOUTH AT BEDTIME    sevelamer carbonate (RENVELA) 800 mg Tab Take 2 tablets (1,600 mg total) by mouth 3 (three) times daily with meals.    VITAMIN D2 50,000 unit capsule ONE EVERY WEEK (Patient taking differently: PT TAKEN EVERY TUES,THUR ,SAT)    [DISCONTINUED] cloNIDine 0.3 mg/24 hr td  "ptwk (CATAPRES) 0.3 mg/24 hr Place 1 patch onto the skin once a week.      No current facility-administered medications on file prior to visit.        Review of patient's allergies indicates:   Allergen Reactions    Hydralazine Other (See Comments)     Pt. states "tiredness."      Ramipril      Other reaction(s): deathly ill  Other reaction(s): deathly ill    Simvastatin      Other reaction(s): deathly ill  Other reaction(s): deathly ill       OBJECTIVE:   Vital Signs:  Vitals:    07/10/18 1411 07/10/18 1431   BP: (!) 160/52 (!) 176/52   Pulse: (!) 55    SpO2: (!) 91%    Weight: 65 kg (143 lb 4.8 oz)    Height: 5' (1.524 m)        No results found for this or any previous visit (from the past 24 hour(s)).      Physical Exam   Constitutional: She appears well-developed.   HENT:   Head: Normocephalic and atraumatic.   Right Ear: External ear normal.   Left Ear: External ear normal.   Nose: Nose normal.   Eyes: EOM are normal. Pupils are equal, round, and reactive to light.   Neck: No tracheal deviation present. No thyromegaly present.   Cardiovascular: Normal rate.    No murmur heard.  Pulmonary/Chest: Effort normal and breath sounds normal.   Abdominal: Soft. There is no tenderness. No hernia.   Musculoskeletal: She exhibits no edema.   Neurological: She displays normal reflexes. No cranial nerve deficit.   Skin: No rash noted.   Psychiatric: She has a normal mood and affect. Judgment normal.   Vitals reviewed.        ASSESSMENT & PLAN:     Beth was seen today for annual exam.    Diagnoses and all orders for this visit:    Annual physical exam    HTN/uncontrolled in HD pt  -Continue for now: Cozaar 100mg,Coreg 25mg BID, and Norvasc 10mg  -Advised pt to write down BPs at HD and bring them to next visit. RTC 4-6 months with Dr. Sanchez.     ESRD  -F/u with Nephrologist/HD 3 days/week    Health Maintenance/pt deferring on MMG,Colon Ca screening  -F/u with PCP to further discuss    Otherwise keep current med " regimen.      Loco Thomson MD  Internal Medicine PGY-2  [phone]       I have personally seen and examined patient and agree with the A/P as noted above.     Cayla Stoddard

## 2018-07-30 PROBLEM — H25.13 NUCLEAR SCLEROSIS OF BOTH EYES: Status: ACTIVE | Noted: 2018-01-01

## 2018-07-30 PROBLEM — E11.9 DM TYPE 2 WITHOUT RETINOPATHY: Status: ACTIVE | Noted: 2018-01-01

## 2018-07-30 PROBLEM — H52.7 REFRACTIVE ERROR: Status: ACTIVE | Noted: 2018-01-01

## 2018-07-30 PROBLEM — H26.9 CORTICAL CATARACT OF BOTH EYES: Status: ACTIVE | Noted: 2018-01-01

## 2018-07-30 NOTE — PROGRESS NOTES
Subjective:       Patient ID: Beth Salcedo is a 73 y.o. female.    Chief Complaint: Cataract    HPI     Pt is here to establish eye care. Pt states last eye exam was X 3 years   ago and was told she needed CE OU. Pt states VA is blurry OU. Pt denies   eye allergies, glare, floaters, and flashes. Pt denies any ocular sx and   procedures.     Eyemeds  No gtts    Hemoglobin A1C       Date                     Value               Ref Range             Status                08/30/2017               6.0 (H)             4.0 - 5.6 %           Final              Comment:    According to ADA guidelines, hemoglobin A1c <7.0% represents  optimal   control in non-pregnant diabetic patients. Different  metrics may apply to   specific patient populations.   Standards of Medical Care in   Diabetes-2016.  For the purpose of screening for the presence of   diabetes:  <5.7%     Consistent with the absence of diabetes  5.7-6.4%    Consistent with increasing risk for diabetes   (prediabetes)  >or=6.5%    Consistent with diabetes  Currently, no consensus exists for use of   hemoglobin A1c  for diagnosis of diabetes for children.  This Hemoglobin   A1c assay has significant interference with fetal   hemoglobin   (HbF).   The results are invalid for patients with abnormal amounts of   HbF,     including those with known Hereditary Persistence   of Fetal Hemoglobin.   Heterozygous hemoglobin variants (HbAS, HbAC,   HbAD, HbAE, HbA2) do not   significantly interfere with this assay;   however, presence of multiple   variants in a sample may impact the %   interference.         12/14/2016               6.4 (H)             4.5 - 6.2 %           Final              Comment:    According to ADA guidelines, hemoglobin A1C <7.0% represents  optimal   control in non-pregnant diabetic patients.  Different  metrics may apply   to specific populations.   Standards of Medical Care in Diabetes -   2016.  For the purpose of screening for  the presence of diabetes:  <5.7%       Consistent with the absence of diabetes  5.7-6.4%  Consistent with   increasing risk for diabetes   (prediabetes)  >or=6.5%  Consistent with   diabetes  Currently no consensus exists for use of hemoglobin A1C  for   diagnosis of diabetes for children.         06/05/2016               5.2                 4.5 - 6.2 %           Final            ----------      Last edited by Chhaya Cristina on 7/30/2018  9:05 AM. (History)             Assessment:       1. Nuclear sclerosis of both eyes    2. Cortical cataract of both eyes    3. DM type 2 without retinopathy    4. Refractive error        Plan:       Visually significant cataract OU -Pt. Wants Sx.     DM-No NPDR OU.  RE        Cataract Surgery Consent: Patient with a visually significant cataract with difficulties of ADLs, reading, driving, night vision, glare (any and all).  Discussed with Patient/Family/Caregiver: options, risks and benefits, expectations of cataract surgery, utilized an eye model with questions and answers to facilitate discussion.  Discussed lens options and patient understands that glasses may be required for optimal vision for distance and/or near vision after cataract surgery.  The Patient/Family/Caregiver  voice good understanding and patient wishes to proceed with surgery.  The patient will likely benefit from surgery and patient signed consent for Right Eye.  CE OD 8/7/18 SN60WF 21.0,        OS 8/21/18 SN60WF 22.0.  Control DM.

## 2018-08-03 NOTE — TELEPHONE ENCOUNTER
----- Message from Mariam Smith sent at 8/3/2018  1:24 PM CDT -----  Contact: Beth  Needs Advice    Reason for call:    Pt called to f/u about her cataract surgery.  Communication Preference: [phone]  Additional Information:

## 2018-08-04 NOTE — H&P
"[facility]  History & Physical    Subjective:      Chief Complaint/Reason for Admission:     Beth Salcedo is a 73 y.o. female.    Past Medical History:   Diagnosis Date    Anemia of chronic renal failure, stage 5     Chronic kidney disease     Diabetes mellitus type II     Diastolic heart failure     ESRD on dialysis since 6/10/16     Essential hypertension     Hyperlipidemia     Hypertension     LVH (left ventricular hypertrophy)     Secondary hyperparathyroidism of renal origin      Past Surgical History:   Procedure Laterality Date    ADRENAL GLAND SURGERY  1990s    removed for tumor which she states was benign     HYSTERECTOMY  1990s    removed for tumor which she states was benign     permcath placement       Family History   Problem Relation Age of Onset    Pneumonia Brother     Diabetes Paternal Aunt     Kidney disease Neg Hx      Social History   Substance Use Topics    Smoking status: Never Smoker    Smokeless tobacco: Never Used    Alcohol use No      Comment: quit since she started dialysis; previously was only having wine occasionally and in her younger years would have whiskey/soda, etc       No prescriptions prior to admission.     Review of patient's allergies indicates:   Allergen Reactions    Hydralazine Other (See Comments)     Pt. states "tiredness."      Ramipril      Other reaction(s): deathly ill  Other reaction(s): deathly ill    Simvastatin      Other reaction(s): deathly ill  Other reaction(s): deathly ill        Review of Systems   Eyes: Positive for blurred vision.   All other systems reviewed and are negative.      Objective:      Vital Signs (Most Recent)       Vital Signs Range (Last 24H):       Physical Exam   Constitutional: She is oriented to person, place, and time. She appears well-developed and well-nourished.   HENT:   Head: Normocephalic.   Eyes: Conjunctivae and EOM are normal. Pupils are equal, round, and reactive to " light.   Neck: Normal range of motion. Neck supple.   Cardiovascular: Normal rate, regular rhythm and normal heart sounds.    Pulmonary/Chest: Effort normal and breath sounds normal.   Abdominal: Soft. Bowel sounds are normal.   Musculoskeletal: Normal range of motion.   Neurological: She is alert and oriented to person, place, and time.   Skin: Skin is warm.   Psychiatric: She has a normal mood and affect.       Data Review:    ECG:      Assessment:      Cataract OD.    Plan:    CE OD.

## 2018-08-06 NOTE — PRE-PROCEDURE INSTRUCTIONS
"Spoke with Patient.  NPO, medication, and pre-op instructions reviewed.  Denies previous problems with Anesthesia.  Is requesting "to feel nothing."  Goes to Dialysis on Mondays, Wednesdays, and Fridays.  Reviewed the flow of the surgical day.  Verbalized understanding of instructions.    "

## 2018-08-07 PROBLEM — H25.10 SENILE NUCLEAR SCLEROSIS: Status: ACTIVE | Noted: 2018-01-01

## 2018-08-07 NOTE — ANESTHESIA POSTPROCEDURE EVALUATION
Anesthesia Post Evaluation    Patient: Beth Salcedo    Procedure(s) Performed: Procedure(s) (LRB):  PHACOEMULSIFICATION, CATARACT (Right)  INSERTION, INTRAOCULAR LENS PROSTHESIS (Right)    Final Anesthesia Type: MAC  Patient location during evaluation: [facility]  Patient participation: Yes- Able to Participate  Level of consciousness: awake and alert and oriented  Post-procedure vital signs: reviewed and stable  Pain management: adequate  Airway patency: patent  PONV status at discharge: No PONV  Anesthetic complications: no      Cardiovascular status: blood pressure returned to baseline  Respiratory status: unassisted and spontaneous ventilation  Hydration status: euvolemic  Follow-up not needed.        Visit Vitals  BP (!) 124/41 (BP Location: Right leg, Patient Position: Sitting)   Pulse (!) 52   Temp 36.7 °C (98.1 °F) (Temporal)   Resp 18   Ht 5' (1.524 m)   Wt 62.6 kg (138 lb)   LMP  (LMP Unknown)   SpO2 95%   Breastfeeding? No   BMI 26.95 kg/m²       Pain/Mary Lou Score: Pain Assessment Performed: Yes (8/7/2018 12:15 PM)  Presence of Pain: complains of pain/discomfort (8/7/2018 12:15 PM)  Mary Lou Score: 10 (8/7/2018 12:15 PM)

## 2018-08-07 NOTE — BRIEF OP NOTE
Operative Note     SUMMARY     Surgery Date: 8/7/2018    Surgeon(s) and Role:      Abdi Kaye MD - Primary    Pre-op Diagnosis:  Nuclear sclerosis     Post-op/ Diagnosis:  Same    Final Diagnosis: Cataract    Procedure(s) (LRB):  PHACOEMULSIFICATION-ASPIRATION-CATARACT   INSERTION-INTRAOCULAR LENS (IOL)     Anesthesia: Monitored Anesthesia Care    Findings/Key Components:  Cataract    Outcome: Tolerated procedure well    Estimated Blood Loss: None         Specimens     None          Follow-up:  Tomorrow in clinic      Discharge Note      SUMMARY     Admit Date: 8/7/2018    Attending Physician: Abdi Kaye MD    Discharge Physician: Abdi Kaye MD    Discharge Date: 8/7/2018    Final Diagnosis: Cataract    Outcome: Tolerated procedure well    Disposition: Discharge to Home.    Medications:       Beth Salcedo   Home Medication Instructions [id number]    Printed on:08/07/18 4239   Medication Information                      amLODIPine (NORVASC) 10 MG tablet  TAKE ONE DAILY             aspirin 81 mg Tab  Take 1 tablet by mouth nightly.              blood sugar diagnostic Strp  Test 3 times daily. Please dispense 1 meter covered by insurance. Meter, Strips, and lancets             carvedilol (COREG) 25 MG tablet  Take 1 tablet (25 mg total) by mouth 2 (two) times daily.             cholecalciferol, vitamin D3, (VITAMIN D3) 2,000 unit Cap  Take 1 capsule by mouth every morning. Is unsure of the dosage             difluprednate (DUREZOL) 0.05 % Drop ophthalmic solution  Place 1 drop into the right eye 4 (four) times daily. For 30 days             ILEVRO 0.3 % DrpS  Place 1 drop into both eyes once daily. For 30 days             losartan (COZAAR) 100 MG tablet  TAKE ONE BY MOUTH AT BEDTIME             ofloxacin (OCUFLOX) 0.3 % ophthalmic solution  Place 1 drop into the right eye 4 (four) times daily. for 10 days             sevelamer carbonate (RENVELA) 800 mg  Tab  Take 2 tablets (1,600 mg total) by mouth 3 (three) times daily with meals.                   Patient Discharge Instructions:     Keep Tobin Shield over operated eye when not using drops.     DIET:  Regular    Activity: No heavy lifting or bending X 1 week.    Follow-up:  Tomorrow in clinic

## 2018-08-07 NOTE — PLAN OF CARE
Pt tolerated procedure well, denies n/v, tolerated ice chips and water well, c/o mild discomfort in eye, refuses pain meds, vss, nad, d/c instructions reviewed with pt and daughter, both v/u. Consents with chart

## 2018-08-07 NOTE — DISCHARGE INSTRUCTIONS
Discharge Instructions for Cataract Surgery  A surgeon removed the cloudy lens in your eye and replaced it with a clear man-made lens. Be sure to have an adult family member or friend drive you home after surgery. Heres what you can expect following surgery and tips for a healthy recovery.  It is normal to have the following:  · Bruised or bloodshot eye for 7 days  · Itching and mild discomfort for several days  · Some fluid discharge  · Sensitivity to light  · Scratchy, sandlike feeling in the eye for 2 weeks  · Feeling tired, especially during the first 24 hours  Activity level  · Do not drive for 2 days or as instructed by your doctor.  · Do not drink alcohol for at least 24 hours.  · Avoid bending at the waist to  objects or lifting anything heavy for 2 days.  · Relax for the first 24 hours after surgery. Watching TV and reading are OK and wont harm your eye.  Eye protection  · Do not rub or press on your eye.  · Sleep on your back or on your unoperated side for 2 nights.  · If instructed, wear a bandage over your eye for 2 days and 2 nights.  · If instructed, wear a shield to protect your eye for 2 days and 2 nights.  Using eyedrops  You may be given special eyedrops or ointment. Here is one way to use eyedrops:  · Tilt your head back.  · Pull your bottom eyelid down.  · Squeeze one drop into your eye. Do not touch your eye with the bottle tip.  · Close your eyes for a few seconds.  · If you need more than one drop, wait a few minutes before adding the next one.   Call your doctor right away if you have any of the following:  · Bleeding or discharge from the eye  · Your vision suddenly becomes worse  · Pain medicine you are told to take does not ease your pain  · Nausea or vomiting  · Chills or fever over 100.4°F (39.1°C)   Date Last Reviewed: 5/30/2015  © 0662-8151 [employer]. [address] [place] [address], [place] [address]. All rights reserved. This information is not intended as a  substitute for professional medical care. Always follow your healthcare professional's instructions.

## 2018-08-07 NOTE — ANESTHESIA PREPROCEDURE EVALUATION
"                                                                                                             08/07/2018  Beth Salcedo is a 73 y.o., female .      Patient Active Problem List   Diagnosis    Mixed hyperlipidemia    Cataract, bilateral    Type 2 diabetes mellitus with stage 5 chronic kidney disease and hypertension    Atherosclerosis of aorta    Anemia of chronic renal failure, stage 5    Hypervolemia    ESRD (end stage renal disease) on dialysis    Essential hypertension    Secondary hyperparathyroidism of renal origin    LVH (left ventricular hypertrophy)    Diastolic heart failure    Hemodialysis access, AV graft    Venous insufficiency of both lower extremities    PAOD (peripheral arterial occlusive disease)    Statin intolerance    ESRD (end stage renal disease)    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 states "tiredness."      Ramipril      Other reaction(s): deathly ill  Other reaction(s): deathly ill    Simvastatin      Other reaction(s): deathly ill  Other reaction(s): deathly ill           Past Surgical History:   Procedure Laterality Date    ADRENAL GLAND SURGERY  1990s    removed for tumor which she states was benign     AV graft      HYSTERECTOMY  1990s    removed for tumor which she states was benign     permcath placement         Social History     Social History    Marital status:      " Spouse name: N/A    Number of children: N/A    Years of education: N/A     Occupational History    Not on file.     Social History Main Topics    Smoking status: Never Smoker    Smokeless tobacco: Never Used    Alcohol use No      Comment: quit since she started dialysis; previously was only having wine occasionally and in her younger years would have whiskey/soda, etc    Drug use: No    Sexual activity: Not on file     Other Topics Concern    Not on file     Social History Narrative    Lives with daughter (born in 1964)    Owns and operates day care business     Previously was a           Vital Signs Range (Last 24H):  Temp:  [36.7 °C (98.1 °F)]   Pulse:  [58]   Resp:  [20]   BP: (132)/(49)   SpO2:  [97 %]           Diagnostic Studies:      EKG:  Normal sinus rhythm  Left atrial enlargement  Left axis deviation  Prolonged QT  Left ventricular hypertrophy by voltage criteria  Abnormal ECG  When compared with ECG of 13-DEC-2016 07:34,  No significant change was found  Confirmed by MARIFER CRUZ MD ([id number]) on 8/28/2017 12:41:55 PM    2D Echo:  CONCLUSIONS     1 - Severe left atrial enlargement.     2 - Moderate right atrial enlargement.     3 - Eccentric hypertrophy.     4 - Normal left ventricular systolic function (EF 60-65%).     5 - Right ventricular enlargement with mildly depressed systolic function.     6 - Moderate tricuspid regurgitation.     7 - Pulmonary hypertension. The estimated PA systolic pressure is 80 mmHg.     8 - Trivial pericardial effusion.   This document has been electronically    SIGNED BY: Ronald Kate MD On: 08/28/2017 08:48      Pre-op Assessment    I have reviewed the Patient Summary Reports.     I have reviewed the Nursing Notes.   I have reviewed the Medications.     Review of Systems  Anesthesia Hx:  No problems with previous Anesthesia  Denies Family Hx of Anesthesia complications.   Denies Personal Hx of Anesthesia complications.    Hematology/Oncology:  Hematology Normal   Oncology Normal     EENT/Dental:EENT/Dental Normal   Cardiovascular:   Exercise tolerance: good Hypertension, poorly controlled CHF ECG has been reviewed. CONCLUSIONS     1 - Concentric LVH with normal left ventricular systolic function (EF 60-65%).     2 - Severe left atrial enlargement.     3 - Left ventricular diastolic dysfunction.     4 - Normal right ventricular systolic function .     5 - Bilateral pleural effusion.    Functional Capacity good / => 4 METS    Pulmonary:  Pulmonary Normal    Renal/:   Chronic Renal Disease, ESRD, Dialysis    Hepatic/GI:  Hepatic/GI Normal    Musculoskeletal:  Musculoskeletal Normal    Neurological:  Neurology Normal    Endocrine:   Diabetes, type 2    Dermatological:  Skin Normal    Psych:  Psychiatric Normal           Physical Exam  General:  Well nourished    Airway/Jaw/Neck:  Airway Findings: Mouth Opening: Normal Tongue: Normal  General Airway Assessment: Adult  Mallampati: II  Improves to II with phonation.  Jaw/Neck Findings:  Neck ROM: Normal ROM     Eyes/Ears/Nose:  Eyes/Ears/Nose Findings:    Dental:  Dental Findings: In tact   Chest/Lungs:  Chest/Lungs Findings: Clear to auscultation, Normal Respiratory Rate     Heart/Vascular:  Heart Findings: Rate: Normal  Rhythm: Regular Rhythm  Sounds: Normal  Heart Murmur  Vascular Findings:  Dialysis Access: AV Fistula LUE     Abdomen:  Abdomen Findings: Normal    Musculoskeletal:  Musculoskeletal Findings: Normal   Skin:  Skin Findings: Normal    Mental Status:  Mental Status Findings:  Cooperative, Alert and Oriented, Anxious         Anesthesia Plan  Type of Anesthesia, risks & benefits discussed:  Anesthesia Type:  MAC  Patient's Preference: MAC  Intra-op Monitoring Plan: standard ASA monitors  Intra-op Monitoring Plan Comments:   Post Op Pain Control Plan:   Post Op Pain Control Plan Comments:   Induction:   IV  Beta Blocker:  Patient is on a Beta-Blocker and has received one  dose within the past 24 hours (No further documentation required).       Informed Consent: Patient understands risks and agrees with Anesthesia plan.  Questions answered. Anesthesia consent signed with patient.  ASA Score: 4     Day of Surgery Review of History & Physical: I have interviewed and examined the patient. I have reviewed the patient's H&P dated:  There are no significant changes.  H&P update referred to the surgeon.         Ready For Surgery From Anesthesia Perspective.

## 2018-08-07 NOTE — TRANSFER OF CARE
Anesthesia Transfer of Care Note    Patient: Beth Salcedo    Procedure(s) Performed: Procedure(s) (LRB):  PHACOEMULSIFICATION, CATARACT (Right)  INSERTION, INTRAOCULAR LENS PROSTHESIS (Right)    Patient location: PACU    Anesthesia Type: MAC    Transport from OR: Transported from OR on room air with adequate spontaneous ventilation    Post pain: adequate analgesia    Post assessment: no apparent anesthetic complications    Post vital signs: stable    Level of consciousness: awake    Nausea/Vomiting: no nausea/vomiting    Complications: none    Transfer of care protocol was followed      Last vitals:   Visit Vitals  BP (!) 132/49 (BP Location: Right arm, Patient Position: Lying)   Pulse (!) 58   Temp 36.7 °C (98.1 °F) (Temporal)   Resp 20   Ht 5' (1.524 m)   Wt 62.6 kg (138 lb)   LMP  (LMP Unknown)   SpO2 97%   Breastfeeding? No   BMI 26.95 kg/m²

## 2018-08-08 PROBLEM — Z98.890 POST-OPERATIVE STATE: Status: ACTIVE | Noted: 2018-01-01

## 2018-08-08 NOTE — OP NOTE
DATE OF PROCEDURE:  08/07/2018    SURGEON:  Abdi Kaye M.D.    PREOPERATIVE DIAGNOSIS:  Nuclear sclerotic cataract, right eye.     POSTOPERATIVE DIAGNOSIS:  Nuclear sclerotic cataract, right eye.     PROCEDURE:  Clear corneal phacoemulsification with posterior chamber intraocular   lens implant, right eye.     ANESTHESIA:  Monitored anesthesia care with 2% Xylocaine jelly topically, 1%   free lidocaine topically and intrachamberly.     PROCEDURE IN DETAIL:  After the appropriate preoperative consent was obtained,   the patient had the 2% Xylocaine jelly applied to the right cornea.  The patient   was then brought to the operating room and placed into the supine position.    The right periorbit was then prepped and draped in the usual sterile ophthalmic   fashion.  A lid speculum was then inserted into the right eye.  Several drops of   the 1% lidocaine were placed onto the right cornea.  The 1% lidocaine was also   applied to the perilimbal region with the Weck-nat sponge.  Using the 0.12-mm   forceps and the Super Sharp blade, a paracentesis site was made at the 12   o'clock position.  Approximately 0.5 mL of the 1% lidocaine was injected into   the anterior chamber.  Next, Viscoat was injected into the anterior chamber   through the paracentesis site.  The 2.75-mm keratome and the cyclodialysis   spatula were used to create a 2.75-mm clear corneal temporal groove.  The   cystitomy needle and Utrata forceps were then used to create a continuous tear   360-degree capsulorrhexis.  BSS in a cannula was then used for hydrodissection.    Phacoemulsification then proceeded in a stop-and-chop fashion without any   complications.  Another 0.5 mL of the 1% lidocaine was injected into the   anterior chamber.  The curved tip irrigation aspiration handpiece was then used   to remove the residual cortical material from the capsular bag.  Again, the 1%   lidocaine was applied to the perilimbal region with the Weck-nat  sponge.  Healon   GV was then injected into the anterior chamber and capsular bag.  An [employer] intraocular lens model SN60WF, 21.0 diopters in power, and serial   number [id number] was injected into the capsular bag with the lens injector.    The Sinskey hook was used to position the lens into its proper place.  The   residual viscoelastic material was removed from the anterior chamber and   capsular bag with the curved tip irrigation aspiration handpiece.  Both wounds   were hydrated with BSS on a cannula.  Both wounds were checked and found to be   watertight.  The lid speculum was then removed from the right eye.  The patient   then had 1 drop of Vigamox ophthalmic drop and 1 drop of Econopred +1%   ophthalmic drop instilled onto the right cornea.  The eye was then shielded.    The patient tolerated the procedure well and was then brought to the recovery   room in good condition.      RENNY  dd: 08/07/2018 21:30:44 (CDT)  td: 08/07/2018 21:40:20 (CDT)  Doc ID   #[id number]  Job ID #[id number]    CC:

## 2018-08-08 NOTE — PROGRESS NOTES
Subjective:       Patient ID: Beth Salecdo is a 73 y.o. female.    Chief Complaint: Post-op Evaluation (1 day po od)    HPI     Post-op Evaluation    Additional comments: 1 day po od           Comments   S/p phaco w/IOL OD- 8/7/18    1 day po od- Pt states sx went well. Pt denies pain and discomfort.     Eyemeds  Durezol QID OD  Ilevro QD OD   Ofloxacin QID OD        Last edited by Chhaya Cristina on 8/8/2018  9:36 AM. (History)             Assessment:       1. Post-operative state        Plan:       S/p CE OD- Doing well.           CPM OD.  RTC 1 wk.

## 2018-08-15 NOTE — PROGRESS NOTES
Subjective:       Patient ID: Beth Salcedo is a 73 y.o. female.    Chief Complaint: Post-op Evaluation (1 week PO OD )    HPI     Post-op Evaluation      Additional comments: 1 week PO OD               Comments     73 y.o. Female is here for 1 week PO OD. Denies eye pain and f/f. No   blurred vision right eye.     Meds: Durezol BID OD             Ilevro qd OD           Last edited by SHARIFA Alonzo on 8/15/2018 10:06 AM. (History)             Assessment:       1. Post-operative state    2. Nuclear sclerosis, left        Plan:       S/p CE OD- Doing well.     Visually significant cataract OS -Pt. Wants Sx.        Taper gtts OD.   Cataract Surgery Consent: Patient with a visually significant cataract with difficulties of ADLs, reading, driving, night vision, glare (any and all).  Discussed with Patient/Family/Caregiver: options, risks and benefits, expectations of cataract surgery, utilized an eye model with questions and answers to facilitate discussion.  Discussed lens options and patient understands that glasses may be required for optimal vision for distance and/or near vision after cataract surgery.  The Patient/Family/Caregiver  voice good understanding and patient wishes to proceed with surgery.  The patient will likely benefit from surgery and patient signed consent for Left Eye.  CE OS 8/21/18.

## 2018-08-19 NOTE — H&P
[facility]  History & Physical    Subjective:      Chief Complaint/Reason for Admission:     Beth Salcedo is a 73 y.o. female.    Past Medical History:   Diagnosis Date    (HFpEF) heart failure with preserved ejection fraction     Adrenal adenoma     Anemia of chronic renal failure, stage 5     Atherosclerosis of aorta     Biatrial enlargement     Cataract     Bilateral    Chronic kidney disease     Diabetes mellitus type II     Diastolic heart failure     ESRD on dialysis since 6/10/16     Essential hypertension     Hyperlipidemia     Hypertension     Hypervolemia     LVH (left ventricular hypertrophy)     Moderate tricuspid regurgitation     PAOD (peripheral arterial occlusive disease)     Pleural effusion     Pulmonary hypertension     Pulmonary nodules     Secondary hyperparathyroidism of renal origin     Venous insufficiency of both lower extremities     Vitamin D deficiency      Past Surgical History:   Procedure Laterality Date    ADRENAL GLAND SURGERY  1990s    removed for tumor which she states was benign     AV graft      CATARACT EXTRACTION W/  INTRAOCULAR LENS IMPLANT Right 08/07/2018    Dr. Kaye    HYSTERECTOMY  1990s    removed for tumor which she states was benign     permcath placement       Family History   Problem Relation Age of Onset    Pneumonia Brother     Diabetes Paternal Aunt     Cataracts Cousin     Kidney disease Neg Hx     Amblyopia Neg Hx     Blindness Neg Hx     Glaucoma Neg Hx     Macular degeneration Neg Hx     Retinal detachment Neg Hx     Strabismus Neg Hx     Stroke Neg Hx      Social History     Tobacco Use    Smoking status: Never Smoker    Smokeless tobacco: Never Used   Substance Use Topics    Alcohol use: No     Comment: quit since she started dialysis; previously was only having wine occasionally and in her younger years would have whiskey/soda, etc    Drug use: No       No medications  "prior to admission.     Review of patient's allergies indicates:   Allergen Reactions    Ramipril Other (See Comments)     Other reaction(s): deathly ill      Simvastatin Other (See Comments)     Other reaction(s): Deathly ill      Hydralazine Other (See Comments)     Pt. states "tiredness."          Review of Systems   Eyes: Positive for blurred vision.   All other systems reviewed and are negative.      Objective:      Vital Signs (Most Recent)       Vital Signs Range (Last 24H):       Physical Exam   Constitutional: She is oriented to person, place, and time. She appears well-developed and well-nourished.   HENT:   Head: Normocephalic.   Eyes: Conjunctivae and EOM are normal. Pupils are equal, round, and reactive to light.   Neck: Normal range of motion. Neck supple.   Cardiovascular: Normal rate, regular rhythm and normal heart sounds.   Pulmonary/Chest: Effort normal and breath sounds normal.   Abdominal: Soft. Bowel sounds are normal.   Musculoskeletal: Normal range of motion.   Neurological: She is alert and oriented to person, place, and time.   Skin: Skin is warm.   Psychiatric: She has a normal mood and affect.       Data Review:    ECG:      Assessment:      Cataract OS.    Plan:    CE OS.    "

## 2018-08-21 NOTE — BRIEF OP NOTE
Operative Note     SUMMARY     Surgery Date: 8/21/2018    Surgeon(s) and Role:      Abdi Kaye MD - Primary    Pre-op Diagnosis:  Nuclear sclerosis     Post-op/ Diagnosis:  Same    Final Diagnosis: Cataract    Procedure(s) (LRB):  PHACOEMULSIFICATION-ASPIRATION-CATARACT   INSERTION-INTRAOCULAR LENS (IOL)     Anesthesia: Monitored Anesthesia Care    Findings/Key Components:  Cataract    Outcome: Tolerated procedure well    Estimated Blood Loss: None         Specimens     None          Follow-up:  Tomorrow in clinic      Discharge Note      SUMMARY     Admit Date: 8/21/2018    Attending Physician: Abdi Kaye MD    Discharge Physician: Abdi Kaye MD    Discharge Date: 8/21/2018    Final Diagnosis: Cataract    Outcome: Tolerated procedure well    Disposition: Discharge to Home.    Medications:       Beth aSlcedo   Home Medication Instructions SEGUNDO:[id number]    Printed on:08/21/18 1053   Medication Information                      amLODIPine (NORVASC) 10 MG tablet  TAKE ONE DAILY             aspirin 81 mg Tab  Take 1 tablet by mouth nightly.              blood sugar diagnostic Strp  Test 3 times daily. Please dispense 1 meter covered by insurance. Meter, Strips, and lancets             carvedilol (COREG) 25 MG tablet  Take 1 tablet (25 mg total) by mouth 2 (two) times daily.             cholecalciferol, vitamin D3, (VITAMIN D3) 2,000 unit Cap  Take 1 capsule by mouth every morning. Is unsure of the dosage             difluprednate (DUREZOL) 0.05 % Drop ophthalmic solution  Place 1 drop into the right eye 4 (four) times daily. For 30 days             ILEVRO 0.3 % DrpS  Place 1 drop into both eyes once daily. For 30 days             losartan (COZAAR) 100 MG tablet  TAKE ONE BY MOUTH AT BEDTIME             sevelamer carbonate (RENVELA) 800 mg Tab  Take 2 tablets (1,600 mg total) by mouth 3 (three) times daily with meals.                   Patient Discharge  Instructions:     Keep Tobin Shield over operated eye when not using drops.     DIET:  Regular    Activity: No heavy lifting or bending X 1 week.    Follow-up:  Tomorrow in clinic

## 2018-08-21 NOTE — DISCHARGE INSTRUCTIONS
Recovery After Procedural Sedation (Adult)  You have been given medicine by vein to make you sleep during your surgery. This may have included both a pain medicine and sleeping medicine. Most of the effects have worn off. But you may still have some drowsiness for the next 6 to 8 hours.  Home care  Follow these guidelines when you get home:  · For the next 8 hours, you should be watched by a responsible adult. This person should make sure your condition is not getting worse.  · Don't drink any alcohol for the next 24 hours.  · Don't drive, operate dangerous machinery, or make important business or personal decisions during the next 24 hours.  Note: Your healthcare provider may tell you not to take any medicine by mouth for pain or sleep in the next 4 hours. These medicines may react with the medicines you were given in the hospital. This could cause a much stronger response than usual.  Follow-up care  Follow up with your healthcare provider if you are not alert and back to your usual level of activity within 12 hours.  When to seek medical advice  Call your healthcare provider right away if any of these occur:  · Drowsiness gets worse  · Weakness or dizziness gets worse  · Repeated vomiting  · You can't be awakened   Date Last Reviewed: 10/18/2016  © 6813-8835 [employer]. [address] [place] [address], [place] [address]. All rights reserved. This information is not intended as a substitute for professional medical care. Always follow your healthcare professional's instructions.

## 2018-08-21 NOTE — ANESTHESIA PREPROCEDURE EVALUATION
"                                                                                                             08/21/2018  Beth Salcedo is a 73 y.o., female .      Patient Active Problem List   Diagnosis    Mixed hyperlipidemia    Cataract, bilateral    Type 2 diabetes mellitus with stage 5 chronic kidney disease and hypertension    Atherosclerosis of aorta    Anemia of chronic renal failure, stage 5    Hypervolemia    ESRD (end stage renal disease) on dialysis    Essential hypertension    Secondary hyperparathyroidism of renal origin    LVH (left ventricular hypertrophy)    Diastolic heart failure    Hemodialysis access, AV graft    Venous insufficiency of both lower extremities    PAOD (peripheral arterial occlusive disease)    Statin intolerance    ESRD (end stage renal disease)    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 states "tiredness."      Ramipril      Other reaction(s): deathly ill  Other reaction(s): deathly ill    Simvastatin      Other reaction(s): deathly ill  Other reaction(s): deathly ill           Past Surgical History:   Procedure Laterality Date    ADRENAL GLAND SURGERY  1990s    removed for tumor which she states was benign     AV graft      CATARACT EXTRACTION W/  INTRAOCULAR LENS IMPLANT Right 08/07/2018    Dr. Kaye    HYSTERECTOMY  1990s    removed for tumor which she " states was benign     permcath placement         Social History     Socioeconomic History    Marital status:      Spouse name: Not on file    Number of children: Not on file    Years of education: Not on file    Highest education level: Not on file   Social Needs    Financial resource strain: Not on file    Food insecurity - worry: Not on file    Food insecurity - inability: Not on file    Transportation needs - medical: Not on file    Transportation needs - non-medical: Not on file   Occupational History    Not on file   Tobacco Use    Smoking status: Never Smoker    Smokeless tobacco: Never Used   Substance and Sexual Activity    Alcohol use: No     Comment: quit since she started dialysis; previously was only having wine occasionally and in her younger years would have whiskey/soda, etc    Drug use: No    Sexual activity: Not on file   Other Topics Concern    Not on file   Social History Narrative    Lives with daughter (born in 1964)    Owns and operates day care business     Previously was a           Vital Signs Range (Last 24H):             Diagnostic Studies:      EKG:  Normal sinus rhythm  Left atrial enlargement  Left axis deviation  Prolonged QT  Left ventricular hypertrophy by voltage criteria  Abnormal ECG  When compared with ECG of 13-DEC-2016 07:34,  No significant change was found  Confirmed by MARIFER CRUZ MD ([id number]) on 8/28/2017 12:41:55 PM      Pre-op Assessment    I have reviewed the Patient Summary Reports.     I have reviewed the Nursing Notes.   I have reviewed the Medications.     Review of Systems  Anesthesia Hx:  No problems with previous Anesthesia  Denies Family Hx of Anesthesia complications.   Denies Personal Hx of Anesthesia complications.   Hematology/Oncology:  Hematology Normal   Oncology Normal     EENT/Dental:EENT/Dental Normal   Cardiovascular:   Exercise tolerance: good Hypertension, poorly controlled CHF ECG has been reviewed. CONCLUSIONS      1 - Concentric LVH with normal left ventricular systolic function (EF 60-65%).     2 - Severe left atrial enlargement.     3 - Left ventricular diastolic dysfunction.     4 - Normal right ventricular systolic function .     5 - Bilateral pleural effusion.    Functional Capacity good / => 4 METS    Pulmonary:  Pulmonary Normal    Renal/:   Chronic Renal Disease, ESRD, Dialysis    Hepatic/GI:  Hepatic/GI Normal    Musculoskeletal:  Musculoskeletal Normal    Neurological:  Neurology Normal    Endocrine:   Diabetes, type 2    Dermatological:  Skin Normal    Psych:  Psychiatric Normal           Physical Exam  General:  Well nourished    Airway/Jaw/Neck:  Airway Findings: Mouth Opening: Normal Tongue: Normal  General Airway Assessment: Adult  Mallampati: II  Improves to II with phonation.  Jaw/Neck Findings:  Neck ROM: Normal ROM     Eyes/Ears/Nose:  Eyes/Ears/Nose Findings:    Dental:  Dental Findings: In tact   Chest/Lungs:  Chest/Lungs Findings: Clear to auscultation, Normal Respiratory Rate     Heart/Vascular:  Heart Findings: Rate: Normal  Rhythm: Regular Rhythm  Sounds: Normal  Heart Murmur  Vascular Findings:  Dialysis Access: AV Fistula LUE     Abdomen:  Abdomen Findings: Normal    Musculoskeletal:  Musculoskeletal Findings: Normal   Skin:  Skin Findings: Normal    Mental Status:  Mental Status Findings:  Cooperative, Alert and Oriented, Anxious         Anesthesia Plan  Type of Anesthesia, risks & benefits discussed:  Anesthesia Type:  MAC  Patient's Preference: MAC  Intra-op Monitoring Plan: standard ASA monitors  Intra-op Monitoring Plan Comments:   Post Op Pain Control Plan:   Post Op Pain Control Plan Comments:   Induction:   IV  Beta Blocker:  Patient is on a Beta-Blocker and has received one dose within the past 24 hours (No further documentation required).       Informed Consent: Patient understands risks and agrees with Anesthesia plan.  Questions answered. Anesthesia consent signed with  patient.  ASA Score: 4     Day of Surgery Review of History & Physical: I have interviewed and examined the patient. I have reviewed the patient's H&P dated:  There are no significant changes.  H&P update referred to the surgeon.         Ready For Surgery From Anesthesia Perspective.

## 2018-08-21 NOTE — ANESTHESIA POSTPROCEDURE EVALUATION
Anesthesia Post Evaluation    Patient: Beth Salcedo    Procedure(s) Performed: Procedure(s) (LRB):  PHACOEMULSIFICATION, CATARACT (Left)  INSERTION, INTRAOCULAR LENS PROSTHESIS (Left)    Final Anesthesia Type: MAC  Patient location during evaluation: PACU  Patient participation: Yes- Able to Participate  Level of consciousness: awake and alert  Post-procedure vital signs: reviewed and stable  Pain management: adequate  Airway patency: patent  PONV status at discharge: No PONV  Anesthetic complications: no      Cardiovascular status: blood pressure returned to baseline and hemodynamically stable  Respiratory status: unassisted, spontaneous ventilation and room air  Hydration status: euvolemic  Follow-up not needed.        Visit Vitals  BP (!) 131/47   Pulse (!) 47   Temp 36.9 °C (98.5 °F) (Temporal)   Resp 18   LMP  (LMP Unknown)   SpO2 95%       Pain/Mary Lou Score: Pain Assessment Performed: Yes (8/21/2018 11:01 AM)  Presence of Pain: denies (8/21/2018 11:01 AM)  Mary Lou Score: 10 (8/21/2018 11:01 AM)

## 2018-08-21 NOTE — INTERVAL H&P NOTE
The patient has been examined and the H&P has been reviewed:        Anesthesia/Surgery risks, benefits and alternative options discussed and understood by patient/family.          Active Hospital Problems    Diagnosis  POA    Senile nuclear sclerosis [H25.10]  Yes      Resolved Hospital Problems   No resolved problems to display.

## 2018-08-22 NOTE — PROGRESS NOTES
Subjective:       Patient ID: Beth Salcedo is a 73 y.o. female.    Chief Complaint: Post-op Evaluation (1 day po os)    HPI     Post-op Evaluation      Additional comments: 1 day po os              Comments     S/p phaco w/IOL OS - 8/21/18    1 day po os- Pt states sx went well. Pt denies pain and discomfort.     Eyemeds  Ofloxacin QID OS  Ilevro QD OS  Durezol QID OS           Last edited by Chhaya Cristina on 8/22/2018  9:27 AM. (History)             Assessment:       1. Post-operative state        Plan:       S/p CE OU- Doing well.          CPM OS.  Taper gtts OD.  RTC 1 wk.

## 2018-08-22 NOTE — OP NOTE
DATE OF PROCEDURE:  08/21/2018    SURGEON:  Abdi Kaye M.D.    PREOPERATIVE DIAGNOSIS:  Nuclear sclerotic cataract, left eye.    POSTOPERATIVE DIAGNOSIS:  Nuclear sclerotic cataract, left eye.    PROCEDURE:  Clear corneal phacoemulsification with posterior chamber intraocular   lens implant, left eye.    ANESTHESIA:  Monitored anesthesia care with 2% Xylocaine jelly topically, 1%   free lidocaine topically and intrachamberly.    PROCEDURE IN DETAIL:  After the appropriate preoperative consent was obtained,   the patient had the 2% Xylocaine jelly applied to the cornea.  The patient was   then brought to the operating room and placed into the supine position.  The   left periorbit was then prepped and draped in the usual sterile ophthalmic   fashion.  A lid speculum was then inserted into the left eye.  Several drops of   the 1% lidocaine were placed onto the left cornea.  The 1% lidocaine was also   applied to the perilimbal region with the Weck-nat sponge.  Using the 0.12-mm   forceps and the Super Sharp blade, a paracentesis site was made at the 6 o'clock   position.  Approximately 0.5 mL of the 1% lidocaine was injected into the   anterior chamber.  Next, Viscoat was injected into the anterior chamber through   the paracentesis site.  The 2.75-mm keratome and the cyclodialysis spatula were   used to create a 2.75-mm clear corneal temporal groove.  The cystitomy needle   and Utrata forceps were then used to create a continuous tear 360-degree   capsulorrhexis.  BSS in a cannula was then used for hydrodissection.    Phacoemulsification then proceeded in a stop-and-chop fashion without any   complications.  Another 0.5 mL of the 1% lidocaine was injected into the   anterior chamber.  The curved tip irrigation aspiration handpiece was then used   to remove the residual cortical material from the capsular bag.  Again, the 1%   lidocaine was applied to the perilimbal region with the Weck-nat sponge.  Rogelio    GV was then injected into the anterior chamber and capsular bag.  An [employer] intraocular lens model SN60WF, 22.0 diopters in power, and serial   #[id number] was injected into the capsular bag with the lens injector.  The   Sinskey hook was used to position the lens into its proper place.  The residual   viscoelastic material was removed from the anterior chamber and capsular bag   with the curved tip irrigation aspiration handpiece.  Both wounds were hydrated   with BSS on a cannula.  Both wounds were checked and found to be watertight.    The lid speculum was then removed from the left eye.  The patient then had 1   drop of Vigamox ophthalmic drop and 1 drop of Econopred +1% ophthalmic drop   instilled onto the left cornea.  The eye was then shielded.  The patient   tolerated the procedure well and was then brought to the recovery room in good   condition.      RENNY  dd: 08/21/2018 19:23:48 (CDT)  td: 08/21/2018 19:46:35 (CDT)  Doc ID   #[id number]  Job ID #[id number]    CC:

## 2018-08-29 NOTE — PROGRESS NOTES
Subjective:       Patient ID: Beth Salcedo is a 73 y.o. female.    Chief Complaint: Post-op Evaluation (1 week po os)    HPI     Post-op Evaluation      Additional comments: 1 week po os              Comments     1 week po os- Pt states Va is well OS. Pt denies pain and discomfort.     Eyemeds  Durezol BID OS  Ilervo QD OS           Last edited by Chhaya Cristina on 8/29/2018  8:46 AM. (History)             Assessment:       1. Post-operative state        Plan:       S/p CE OU- Doing well.          Taper gtts OU.  RTC 3 wks.

## 2018-09-19 NOTE — PROGRESS NOTES
Subjective:       Patient ID: Beth Salcedo is a 73 y.o. female.    Chief Complaint: Post-op Evaluation (3 weeks po ou)    HPI     Post-op Evaluation      Additional comments: 3 weeks po ou              Comments     DSL- 8/29/18     73 y.o is here for 3 weeks po ou. Pt states Va is well. Pt denies pain and   discomfort.     Eyemeds  No gtts          Last edited by Chhaya Cristina on 9/19/2018  8:53 AM. (History)             Assessment:       1. Post-operative state    2. Refractive error        Plan:       S/p CE OU- Doing well.  RE-Doing well with readers.          Readers.  RTC 6 mos.

## 2019-01-01 ENCOUNTER — HOSPITAL ENCOUNTER (INPATIENT)
Facility: HOSPITAL | Age: 74
LOS: 3 days | DRG: 291 | End: 2019-07-04
Attending: EMERGENCY MEDICINE | Admitting: EMERGENCY MEDICINE
Payer: MEDICARE

## 2019-01-01 ENCOUNTER — OFFICE VISIT (OUTPATIENT)
Dept: INTERNAL MEDICINE | Facility: CLINIC | Age: 74
End: 2019-01-01
Payer: MEDICARE

## 2019-01-01 ENCOUNTER — PATIENT MESSAGE (OUTPATIENT)
Dept: ADMINISTRATIVE | Facility: HOSPITAL | Age: 74
End: 2019-01-01

## 2019-01-01 ENCOUNTER — TELEPHONE (OUTPATIENT)
Dept: OPHTHALMOLOGY | Facility: CLINIC | Age: 74
End: 2019-01-01

## 2019-01-01 ENCOUNTER — OFFICE VISIT (OUTPATIENT)
Dept: CARDIOLOGY | Facility: CLINIC | Age: 74
End: 2019-01-01
Payer: MEDICARE

## 2019-01-01 ENCOUNTER — HOSPITAL ENCOUNTER (OUTPATIENT)
Dept: RADIOLOGY | Facility: HOSPITAL | Age: 74
Discharge: HOME | End: 2019-02-14
Attending: INTERNAL MEDICINE
Payer: MEDICARE

## 2019-01-01 ENCOUNTER — OFFICE VISIT (OUTPATIENT)
Dept: OPHTHALMOLOGY | Facility: CLINIC | Age: 74
End: 2019-01-01
Payer: MEDICARE

## 2019-01-01 ENCOUNTER — PATIENT OUTREACH (OUTPATIENT)
Dept: ADMINISTRATIVE | Facility: HOSPITAL | Age: 74
End: 2019-01-01

## 2019-01-01 VITALS
DIASTOLIC BLOOD PRESSURE: 78 MMHG | WEIGHT: 142.63 LBS | OXYGEN SATURATION: 95 % | BODY MASS INDEX: 28 KG/M2 | HEART RATE: 63 BPM | HEIGHT: 60 IN | SYSTOLIC BLOOD PRESSURE: 128 MMHG

## 2019-01-01 VITALS
SYSTOLIC BLOOD PRESSURE: 118 MMHG | WEIGHT: 142.19 LBS | BODY MASS INDEX: 27.92 KG/M2 | DIASTOLIC BLOOD PRESSURE: 58 MMHG | HEART RATE: 52 BPM | HEIGHT: 60 IN

## 2019-01-01 VITALS
TEMPERATURE: 98 F | WEIGHT: 140 LBS | SYSTOLIC BLOOD PRESSURE: 120 MMHG | BODY MASS INDEX: 27.48 KG/M2 | DIASTOLIC BLOOD PRESSURE: 63 MMHG | HEIGHT: 60 IN | HEART RATE: 135 BPM | OXYGEN SATURATION: 12 % | RESPIRATION RATE: 30 BRPM

## 2019-01-01 DIAGNOSIS — E87.20 LACTIC ACIDOSIS: ICD-10-CM

## 2019-01-01 DIAGNOSIS — Z13.820 OSTEOPOROSIS SCREENING: ICD-10-CM

## 2019-01-01 DIAGNOSIS — I46.9 CARDIAC ARREST: ICD-10-CM

## 2019-01-01 DIAGNOSIS — I70.0 ATHEROSCLEROSIS OF AORTA: ICD-10-CM

## 2019-01-01 DIAGNOSIS — N18.5 ANEMIA OF CHRONIC RENAL FAILURE, STAGE 5: Chronic | ICD-10-CM

## 2019-01-01 DIAGNOSIS — R00.1 BRADYCARDIA: ICD-10-CM

## 2019-01-01 DIAGNOSIS — I10 ESSENTIAL HYPERTENSION: ICD-10-CM

## 2019-01-01 DIAGNOSIS — Z78.0 ASYMPTOMATIC MENOPAUSAL STATE: ICD-10-CM

## 2019-01-01 DIAGNOSIS — I12.0 TYPE 2 DIABETES MELLITUS WITH STAGE 5 CHRONIC KIDNEY DISEASE AND HYPERTENSION: ICD-10-CM

## 2019-01-01 DIAGNOSIS — R79.89 ELEVATED TROPONIN: ICD-10-CM

## 2019-01-01 DIAGNOSIS — N18.5 TYPE 2 DIABETES MELLITUS WITH STAGE 5 CHRONIC KIDNEY DISEASE AND HYPERTENSION: ICD-10-CM

## 2019-01-01 DIAGNOSIS — I77.9 PAOD (PERIPHERAL ARTERIAL OCCLUSIVE DISEASE): ICD-10-CM

## 2019-01-01 DIAGNOSIS — I27.20 PULMONARY HYPERTENSION: ICD-10-CM

## 2019-01-01 DIAGNOSIS — I50.30 (HFPEF) HEART FAILURE WITH PRESERVED EJECTION FRACTION: Primary | ICD-10-CM

## 2019-01-01 DIAGNOSIS — N18.6 ESRD (END STAGE RENAL DISEASE) ON DIALYSIS: Chronic | ICD-10-CM

## 2019-01-01 DIAGNOSIS — Z12.11 COLON CANCER SCREENING: ICD-10-CM

## 2019-01-01 DIAGNOSIS — I50.30 (HFPEF) HEART FAILURE WITH PRESERVED EJECTION FRACTION: ICD-10-CM

## 2019-01-01 DIAGNOSIS — I51.7 BIATRIAL ENLARGEMENT: ICD-10-CM

## 2019-01-01 DIAGNOSIS — Z78.9 STATIN INTOLERANCE: ICD-10-CM

## 2019-01-01 DIAGNOSIS — E78.2 MIXED HYPERLIPIDEMIA: ICD-10-CM

## 2019-01-01 DIAGNOSIS — I87.2 VENOUS INSUFFICIENCY OF BOTH LOWER EXTREMITIES: ICD-10-CM

## 2019-01-01 DIAGNOSIS — A41.9 SEPSIS, DUE TO UNSPECIFIED ORGANISM: Primary | ICD-10-CM

## 2019-01-01 DIAGNOSIS — I10 ESSENTIAL HYPERTENSION: Chronic | ICD-10-CM

## 2019-01-01 DIAGNOSIS — I50.813 ACUTE ON CHRONIC RIGHT-SIDED HEART FAILURE: ICD-10-CM

## 2019-01-01 DIAGNOSIS — E11.3393 CONTROLLED TYPE 2 DIABETES MELLITUS WITH BOTH EYES AFFECTED BY MODERATE NONPROLIFERATIVE RETINOPATHY WITHOUT MACULAR EDEMA, WITHOUT LONG-TERM CURRENT USE OF INSULIN: Primary | ICD-10-CM

## 2019-01-01 DIAGNOSIS — D63.1 ANEMIA OF CHRONIC RENAL FAILURE, STAGE 5: Chronic | ICD-10-CM

## 2019-01-01 DIAGNOSIS — Z99.2 ESRD (END STAGE RENAL DISEASE) ON DIALYSIS: Chronic | ICD-10-CM

## 2019-01-01 DIAGNOSIS — Z96.1 PSEUDOPHAKIA: ICD-10-CM

## 2019-01-01 DIAGNOSIS — E11.9 DIABETES MELLITUS WITHOUT COMPLICATION: Primary | ICD-10-CM

## 2019-01-01 DIAGNOSIS — R91.1 LUNG NODULE: ICD-10-CM

## 2019-01-01 DIAGNOSIS — R00.1 SINUS BRADYCARDIA: ICD-10-CM

## 2019-01-01 DIAGNOSIS — Z12.39 ENCOUNTER FOR SCREENING FOR MALIGNANT NEOPLASM OF BREAST: Primary | ICD-10-CM

## 2019-01-01 DIAGNOSIS — I51.7 LVH (LEFT VENTRICULAR HYPERTROPHY): ICD-10-CM

## 2019-01-01 DIAGNOSIS — I07.1 MODERATE TRICUSPID REGURGITATION: ICD-10-CM

## 2019-01-01 DIAGNOSIS — E11.22 TYPE 2 DIABETES MELLITUS WITH STAGE 5 CHRONIC KIDNEY DISEASE AND HYPERTENSION: ICD-10-CM

## 2019-01-01 LAB
ALBUMIN SERPL BCP-MCNC: 2.9 G/DL (ref 3.5–5.2)
ALBUMIN SERPL BCP-MCNC: 3.4 G/DL (ref 3.5–5.2)
ALLENS TEST: ABNORMAL
ALP SERPL-CCNC: 77 U/L (ref 55–135)
ALP SERPL-CCNC: 86 U/L (ref 55–135)
ALT SERPL W/O P-5'-P-CCNC: 134 U/L (ref 10–44)
ALT SERPL W/O P-5'-P-CCNC: 204 U/L (ref 10–44)
ANION GAP SERPL CALC-SCNC: 19 MMOL/L (ref 8–16)
ANION GAP SERPL CALC-SCNC: 19 MMOL/L (ref 8–16)
ANISOCYTOSIS BLD QL SMEAR: SLIGHT
APTT BLDCRRT: 59.4 SEC (ref 21–32)
AST SERPL-CCNC: 197 U/L (ref 10–40)
AST SERPL-CCNC: 252 U/L (ref 10–40)
BASO STIPL BLD QL SMEAR: ABNORMAL
BASOPHILS # BLD AUTO: 0.03 K/UL (ref 0–0.2)
BASOPHILS # BLD AUTO: 0.06 K/UL (ref 0–0.2)
BASOPHILS # BLD AUTO: 0.09 K/UL (ref 0–0.2)
BASOPHILS NFR BLD: 0.2 % (ref 0–1.9)
BASOPHILS NFR BLD: 0.4 % (ref 0–1.9)
BASOPHILS NFR BLD: 0.5 % (ref 0–1.9)
BILIRUB SERPL-MCNC: 1.1 MG/DL (ref 0.1–1)
BILIRUB SERPL-MCNC: 1.2 MG/DL (ref 0.1–1)
BNP SERPL-MCNC: >4900 PG/ML (ref 0–99)
BUN SERPL-MCNC: 25 MG/DL (ref 6–30)
BUN SERPL-MCNC: 27 MG/DL (ref 8–23)
BUN SERPL-MCNC: 35 MG/DL (ref 8–23)
BURR CELLS BLD QL SMEAR: ABNORMAL
CALCIUM SERPL-MCNC: 12.7 MG/DL (ref 8.7–10.5)
CALCIUM SERPL-MCNC: 8.7 MG/DL (ref 8.7–10.5)
CHLORIDE SERPL-SCNC: 101 MMOL/L (ref 95–110)
CHLORIDE SERPL-SCNC: 102 MMOL/L (ref 95–110)
CHLORIDE SERPL-SCNC: >140 MMOL/L (ref 95–110)
CO2 SERPL-SCNC: 15 MMOL/L (ref 23–29)
CO2 SERPL-SCNC: 16 MMOL/L (ref 23–29)
CREAT SERPL-MCNC: 3.2 MG/DL (ref 0.5–1.4)
CREAT SERPL-MCNC: 4.8 MG/DL (ref 0.5–1.4)
CREAT SERPL-MCNC: 5.2 MG/DL (ref 0.5–1.4)
DELSYS: ABNORMAL
DIFFERENTIAL METHOD: ABNORMAL
EOSINOPHIL # BLD AUTO: 0 K/UL (ref 0–0.5)
EOSINOPHIL NFR BLD: 0 % (ref 0–8)
ERYTHROCYTE [DISTWIDTH] IN BLOOD BY AUTOMATED COUNT: 15 % (ref 11.5–14.5)
ERYTHROCYTE [DISTWIDTH] IN BLOOD BY AUTOMATED COUNT: 15.1 % (ref 11.5–14.5)
ERYTHROCYTE [DISTWIDTH] IN BLOOD BY AUTOMATED COUNT: 15.1 % (ref 11.5–14.5)
EST. GFR  (AFRICAN AMERICAN): 8.6 ML/MIN/1.73 M^2
EST. GFR  (AFRICAN AMERICAN): 9.5 ML/MIN/1.73 M^2
EST. GFR  (NON AFRICAN AMERICAN): 7.5 ML/MIN/1.73 M^2
EST. GFR  (NON AFRICAN AMERICAN): 8.2 ML/MIN/1.73 M^2
GLUCOSE SERPL-MCNC: 109 MG/DL (ref 70–110)
GLUCOSE SERPL-MCNC: 150 MG/DL (ref 70–110)
GLUCOSE SERPL-MCNC: 182 MG/DL (ref 70–110)
HCO3 UR-SCNC: 17.7 MMOL/L (ref 24–28)
HCO3 UR-SCNC: 19.1 MMOL/L (ref 24–28)
HCT VFR BLD AUTO: 36.5 % (ref 37–48.5)
HCT VFR BLD AUTO: 36.6 % (ref 37–48.5)
HCT VFR BLD AUTO: 39.5 % (ref 37–48.5)
HCT VFR BLD CALC: 18 %PCV (ref 36–54)
HCT VFR BLD CALC: 37 %PCV (ref 36–54)
HGB BLD-MCNC: 11.6 G/DL (ref 12–16)
HGB BLD-MCNC: 11.7 G/DL (ref 12–16)
HGB BLD-MCNC: 11.8 G/DL (ref 12–16)
HYPOCHROMIA BLD QL SMEAR: ABNORMAL
IMM GRANULOCYTES # BLD AUTO: 0.12 K/UL (ref 0–0.04)
IMM GRANULOCYTES # BLD AUTO: 0.36 K/UL (ref 0–0.04)
IMM GRANULOCYTES # BLD AUTO: 0.86 K/UL (ref 0–0.04)
IMM GRANULOCYTES NFR BLD AUTO: 0.8 % (ref 0–0.5)
IMM GRANULOCYTES NFR BLD AUTO: 2.9 % (ref 0–0.5)
IMM GRANULOCYTES NFR BLD AUTO: 3.9 % (ref 0–0.5)
INR PPP: 2.2 (ref 0.8–1.2)
INR PPP: 2.5 (ref 0.8–1.2)
LACTATE SERPL-SCNC: 11.8 MMOL/L (ref 0.5–2.2)
LACTATE SERPL-SCNC: 5.1 MMOL/L (ref 0.5–2.2)
LACTATE SERPL-SCNC: 7 MMOL/L (ref 0.5–2.2)
LDH SERPL L TO P-CCNC: 12.1 MMOL/L (ref 0.36–1.25)
LYMPHOCYTES # BLD AUTO: 0.9 K/UL (ref 1–4.8)
LYMPHOCYTES # BLD AUTO: 2 K/UL (ref 1–4.8)
LYMPHOCYTES # BLD AUTO: 2.1 K/UL (ref 1–4.8)
LYMPHOCYTES NFR BLD: 16.3 % (ref 18–48)
LYMPHOCYTES NFR BLD: 6 % (ref 18–48)
LYMPHOCYTES NFR BLD: 9.4 % (ref 18–48)
MAGNESIUM SERPL-MCNC: 2 MG/DL (ref 1.6–2.6)
MAGNESIUM SERPL-MCNC: 2.3 MG/DL (ref 1.6–2.6)
MCH RBC QN AUTO: 33 PG (ref 27–31)
MCH RBC QN AUTO: 33.1 PG (ref 27–31)
MCH RBC QN AUTO: 33.1 PG (ref 27–31)
MCHC RBC AUTO-ENTMCNC: 29.9 G/DL (ref 32–36)
MCHC RBC AUTO-ENTMCNC: 31.8 G/DL (ref 32–36)
MCHC RBC AUTO-ENTMCNC: 32 G/DL (ref 32–36)
MCV RBC AUTO: 103 FL (ref 82–98)
MCV RBC AUTO: 104 FL (ref 82–98)
MCV RBC AUTO: 111 FL (ref 82–98)
MODE: ABNORMAL
MODE: ABNORMAL
MONOCYTES # BLD AUTO: 0.9 K/UL (ref 0.3–1)
MONOCYTES # BLD AUTO: 1 K/UL (ref 0.3–1)
MONOCYTES # BLD AUTO: 1.4 K/UL (ref 0.3–1)
MONOCYTES NFR BLD: 6.5 % (ref 4–15)
MONOCYTES NFR BLD: 6.7 % (ref 4–15)
MONOCYTES NFR BLD: 7.5 % (ref 4–15)
NEUTROPHILS # BLD AUTO: 12.7 K/UL (ref 1.8–7.7)
NEUTROPHILS # BLD AUTO: 17.7 K/UL (ref 1.8–7.7)
NEUTROPHILS # BLD AUTO: 9 K/UL (ref 1.8–7.7)
NEUTROPHILS NFR BLD: 72.8 % (ref 38–73)
NEUTROPHILS NFR BLD: 79.8 % (ref 38–73)
NEUTROPHILS NFR BLD: 86.3 % (ref 38–73)
NRBC BLD-RTO: 0 /100 WBC
NRBC BLD-RTO: 1 /100 WBC
NRBC BLD-RTO: 1 /100 WBC
OVALOCYTES BLD QL SMEAR: ABNORMAL
PCO2 BLDA: 32.2 MMHG (ref 35–45)
PCO2 BLDA: 50.4 MMHG (ref 35–45)
PH SMN: 7.19 [PH] (ref 7.35–7.45)
PH SMN: 7.35 [PH] (ref 7.35–7.45)
PHOSPHATE SERPL-MCNC: 5.4 MG/DL (ref 2.7–4.5)
PHOSPHATE SERPL-MCNC: 7.1 MG/DL (ref 2.7–4.5)
PLATELET # BLD AUTO: 41 K/UL (ref 150–350)
PLATELET # BLD AUTO: 55 K/UL (ref 150–350)
PLATELET # BLD AUTO: 83 K/UL (ref 150–350)
PLATELET BLD QL SMEAR: ABNORMAL
PMV BLD AUTO: ABNORMAL FL (ref 9.2–12.9)
PO2 BLDA: 33 MMHG (ref 80–100)
PO2 BLDA: 69 MMHG (ref 80–100)
POC BE: -8 MMOL/L
POC BE: -9 MMOL/L
POC IONIZED CALCIUM: 1.17 MMOL/L (ref 1.06–1.42)
POC IONIZED CALCIUM: >2.5 MMOL/L (ref 1.06–1.42)
POC SATURATED O2: 49 % (ref 95–100)
POC SATURATED O2: 93 % (ref 95–100)
POC TCO2 (MEASURED): 13 MMOL/L (ref 23–29)
POC TCO2: 19 MMOL/L (ref 23–27)
POC TCO2: 21 MMOL/L (ref 23–27)
POCT GLUCOSE: 205 MG/DL (ref 70–110)
POIKILOCYTOSIS BLD QL SMEAR: ABNORMAL
POLYCHROMASIA BLD QL SMEAR: ABNORMAL
POTASSIUM BLD-SCNC: 3.2 MMOL/L (ref 3.5–5.1)
POTASSIUM BLD-SCNC: 4.9 MMOL/L (ref 3.5–5.1)
POTASSIUM SERPL-SCNC: 4.7 MMOL/L (ref 3.5–5.1)
POTASSIUM SERPL-SCNC: 5.6 MMOL/L (ref 3.5–5.1)
PROT SERPL-MCNC: 6 G/DL (ref 6–8.4)
PROT SERPL-MCNC: 6.2 G/DL (ref 6–8.4)
PROTHROMBIN TIME: 21 SEC (ref 9–12.5)
PROTHROMBIN TIME: 24.3 SEC (ref 9–12.5)
RBC # BLD AUTO: 3.52 M/UL (ref 4–5.4)
RBC # BLD AUTO: 3.54 M/UL (ref 4–5.4)
RBC # BLD AUTO: 3.57 M/UL (ref 4–5.4)
SAMPLE: ABNORMAL
SCHISTOCYTES BLD QL SMEAR: ABNORMAL
SITE: ABNORMAL
SODIUM BLD-SCNC: 137 MMOL/L (ref 136–145)
SODIUM BLD-SCNC: 138 MMOL/L (ref 136–145)
SODIUM SERPL-SCNC: 135 MMOL/L (ref 136–145)
SODIUM SERPL-SCNC: 137 MMOL/L (ref 136–145)
T4 FREE SERPL-MCNC: 1.08 NG/DL (ref 0.71–1.51)
TROPONIN I SERPL DL<=0.01 NG/ML-MCNC: 2.6 NG/ML (ref 0–0.03)
TROPONIN I SERPL DL<=0.01 NG/ML-MCNC: 3.28 NG/ML (ref 0–0.03)
TROPONIN I SERPL DL<=0.01 NG/ML-MCNC: 3.63 NG/ML (ref 0–0.03)
TSH SERPL DL<=0.005 MIU/L-ACNC: 6.16 UIU/ML (ref 0.4–4)
WBC # BLD AUTO: 12.3 K/UL (ref 3.9–12.7)
WBC # BLD AUTO: 14.73 K/UL (ref 3.9–12.7)
WBC # BLD AUTO: 22.19 K/UL (ref 3.9–12.7)

## 2019-01-01 PROCEDURE — 93010 ELECTROCARDIOGRAM REPORT: CPT | Mod: HCNC,,, | Performed by: INTERNAL MEDICINE

## 2019-01-01 PROCEDURE — 31500 PR INSERT, EMERGENCY ENDOTRACH AIRWAY: ICD-10-PCS | Mod: ,,, | Performed by: EMERGENCY MEDICINE

## 2019-01-01 PROCEDURE — 93010 EKG 12-LEAD: ICD-10-PCS | Mod: HCNC,,, | Performed by: INTERNAL MEDICINE

## 2019-01-01 PROCEDURE — 63600175 PHARM REV CODE 636 W HCPCS: Mod: HCNC

## 2019-01-01 PROCEDURE — 99499 RISK ADDL DX/OHS AUDIT: ICD-10-PCS | Mod: HCNC,S$GLB,, | Performed by: INTERNAL MEDICINE

## 2019-01-01 PROCEDURE — 84132 ASSAY OF SERUM POTASSIUM: CPT | Mod: HCNC

## 2019-01-01 PROCEDURE — 31500 INSERT EMERGENCY AIRWAY: CPT | Mod: ,,, | Performed by: EMERGENCY MEDICINE

## 2019-01-01 PROCEDURE — 1101F PR PT FALLS ASSESS DOC 0-1 FALLS W/OUT INJ PAST YR: ICD-10-PCS | Mod: HCNC,CPTII,S$GLB, | Performed by: INTERNAL MEDICINE

## 2019-01-01 PROCEDURE — 84100 ASSAY OF PHOSPHORUS: CPT | Mod: HCNC

## 2019-01-01 PROCEDURE — 99499 UNLISTED E&M SERVICE: CPT | Mod: HCNC,S$GLB,, | Performed by: INTERNAL MEDICINE

## 2019-01-01 PROCEDURE — 25000003 PHARM REV CODE 250: Mod: HCNC | Performed by: STUDENT IN AN ORGANIZED HEALTH CARE EDUCATION/TRAINING PROGRAM

## 2019-01-01 PROCEDURE — 96368 THER/DIAG CONCURRENT INF: CPT | Mod: HCNC

## 2019-01-01 PROCEDURE — 84484 ASSAY OF TROPONIN QUANT: CPT | Mod: 91,HCNC

## 2019-01-01 PROCEDURE — 71250 CT CHEST WITHOUT CONTRAST: ICD-10-PCS | Mod: 26,HCNC,, | Performed by: RADIOLOGY

## 2019-01-01 PROCEDURE — 92014 COMPRE OPH EXAM EST PT 1/>: CPT | Mod: HCNC,S$GLB,, | Performed by: OPHTHALMOLOGY

## 2019-01-01 PROCEDURE — 27000221 HC OXYGEN, UP TO 24 HOURS: Mod: HCNC

## 2019-01-01 PROCEDURE — 99900035 HC TECH TIME PER 15 MIN (STAT): Mod: HCNC

## 2019-01-01 PROCEDURE — 96366 THER/PROPH/DIAG IV INF ADDON: CPT | Mod: HCNC

## 2019-01-01 PROCEDURE — 99292 PR CRITICAL CARE, ADDL 30 MIN: ICD-10-PCS | Mod: 25,,, | Performed by: EMERGENCY MEDICINE

## 2019-01-01 PROCEDURE — 85610 PROTHROMBIN TIME: CPT | Mod: 91,HCNC

## 2019-01-01 PROCEDURE — 83735 ASSAY OF MAGNESIUM: CPT | Mod: HCNC

## 2019-01-01 PROCEDURE — 83605 ASSAY OF LACTIC ACID: CPT | Mod: HCNC

## 2019-01-01 PROCEDURE — 96365 THER/PROPH/DIAG IV INF INIT: CPT | Mod: HCNC

## 2019-01-01 PROCEDURE — 84443 ASSAY THYROID STIM HORMONE: CPT | Mod: HCNC

## 2019-01-01 PROCEDURE — 92014 PR EYE EXAM, EST PATIENT,COMPREHESV: ICD-10-PCS | Mod: HCNC,S$GLB,, | Performed by: OPHTHALMOLOGY

## 2019-01-01 PROCEDURE — 83735 ASSAY OF MAGNESIUM: CPT | Mod: 91,HCNC

## 2019-01-01 PROCEDURE — 85610 PROTHROMBIN TIME: CPT | Mod: HCNC

## 2019-01-01 PROCEDURE — 85014 HEMATOCRIT: CPT | Mod: HCNC

## 2019-01-01 PROCEDURE — 36620 ARTERIAL LINE: ICD-10-PCS | Mod: HCNC,,, | Performed by: INTERNAL MEDICINE

## 2019-01-01 PROCEDURE — 84100 ASSAY OF PHOSPHORUS: CPT | Mod: 91,HCNC

## 2019-01-01 PROCEDURE — 99214 OFFICE O/P EST MOD 30 MIN: CPT | Mod: HCNC,S$GLB,, | Performed by: INTERNAL MEDICINE

## 2019-01-01 PROCEDURE — 63600175 PHARM REV CODE 636 W HCPCS: Mod: HCNC | Performed by: INTERNAL MEDICINE

## 2019-01-01 PROCEDURE — 83605 ASSAY OF LACTIC ACID: CPT | Mod: 91,HCNC

## 2019-01-01 PROCEDURE — 63600175 PHARM REV CODE 636 W HCPCS

## 2019-01-01 PROCEDURE — 93005 ELECTROCARDIOGRAM TRACING: CPT | Mod: HCNC

## 2019-01-01 PROCEDURE — 82962 GLUCOSE BLOOD TEST: CPT | Mod: 59,HCNC

## 2019-01-01 PROCEDURE — 99999 PR PBB SHADOW E&M-EST. PATIENT-LVL II: ICD-10-PCS | Mod: PBBFAC,HCNC,, | Performed by: OPHTHALMOLOGY

## 2019-01-01 PROCEDURE — 71250 CT THORAX DX C-: CPT | Mod: TC,HCNC

## 2019-01-01 PROCEDURE — 84295 ASSAY OF SERUM SODIUM: CPT | Mod: HCNC

## 2019-01-01 PROCEDURE — 25500020 PHARM REV CODE 255: Mod: HCNC | Performed by: EMERGENCY MEDICINE

## 2019-01-01 PROCEDURE — 37799 UNLISTED PX VASCULAR SURGERY: CPT | Mod: HCNC

## 2019-01-01 PROCEDURE — 12000002 HC ACUTE/MED SURGE SEMI-PRIVATE ROOM: Mod: HCNC

## 2019-01-01 PROCEDURE — 82803 BLOOD GASES ANY COMBINATION: CPT | Mod: HCNC

## 2019-01-01 PROCEDURE — 83880 ASSAY OF NATRIURETIC PEPTIDE: CPT | Mod: HCNC

## 2019-01-01 PROCEDURE — 99292 CRITICAL CARE ADDL 30 MIN: CPT | Mod: HCNC,25,, | Performed by: INTERNAL MEDICINE

## 2019-01-01 PROCEDURE — 99291 CRITICAL CARE FIRST HOUR: CPT | Mod: HCNC,25,, | Performed by: INTERNAL MEDICINE

## 2019-01-01 PROCEDURE — 96361 HYDRATE IV INFUSION ADD-ON: CPT | Mod: HCNC

## 2019-01-01 PROCEDURE — 1101F PT FALLS ASSESS-DOCD LE1/YR: CPT | Mod: HCNC,CPTII,S$GLB, | Performed by: INTERNAL MEDICINE

## 2019-01-01 PROCEDURE — 99999 PR PBB SHADOW E&M-EST. PATIENT-LVL III: CPT | Mod: PBBFAC,HCNC,, | Performed by: INTERNAL MEDICINE

## 2019-01-01 PROCEDURE — 85025 COMPLETE CBC W/AUTO DIFF WBC: CPT | Mod: HCNC

## 2019-01-01 PROCEDURE — 99214 PR OFFICE/OUTPT VISIT, EST, LEVL IV, 30-39 MIN: ICD-10-PCS | Mod: HCNC,S$GLB,, | Performed by: INTERNAL MEDICINE

## 2019-01-01 PROCEDURE — 87040 BLOOD CULTURE FOR BACTERIA: CPT | Mod: 59,HCNC

## 2019-01-01 PROCEDURE — 99900026 HC AIRWAY MAINTENANCE (STAT): Mod: HCNC

## 2019-01-01 PROCEDURE — 63600175 PHARM REV CODE 636 W HCPCS: Mod: HCNC | Performed by: EMERGENCY MEDICINE

## 2019-01-01 PROCEDURE — 99999 PR PBB SHADOW E&M-EST. PATIENT-LVL IV: ICD-10-PCS | Mod: PBBFAC,HCNC,, | Performed by: INTERNAL MEDICINE

## 2019-01-01 PROCEDURE — 84484 ASSAY OF TROPONIN QUANT: CPT | Mod: HCNC

## 2019-01-01 PROCEDURE — 36620 INSERTION CATHETER ARTERY: CPT | Mod: HCNC,,, | Performed by: INTERNAL MEDICINE

## 2019-01-01 PROCEDURE — 99999 PR PBB SHADOW E&M-EST. PATIENT-LVL III: ICD-10-PCS | Mod: PBBFAC,HCNC,, | Performed by: INTERNAL MEDICINE

## 2019-01-01 PROCEDURE — 99291 PR CRITICAL CARE, E/M 30-74 MINUTES: ICD-10-PCS | Mod: HCNC,25,, | Performed by: INTERNAL MEDICINE

## 2019-01-01 PROCEDURE — 80053 COMPREHEN METABOLIC PANEL: CPT | Mod: HCNC

## 2019-01-01 PROCEDURE — 82330 ASSAY OF CALCIUM: CPT | Mod: HCNC

## 2019-01-01 PROCEDURE — 94761 N-INVAS EAR/PLS OXIMETRY MLT: CPT | Mod: HCNC

## 2019-01-01 PROCEDURE — 85730 THROMBOPLASTIN TIME PARTIAL: CPT | Mod: HCNC

## 2019-01-01 PROCEDURE — 36600 WITHDRAWAL OF ARTERIAL BLOOD: CPT | Mod: HCNC

## 2019-01-01 PROCEDURE — 25000003 PHARM REV CODE 250: Mod: HCNC | Performed by: EMERGENCY MEDICINE

## 2019-01-01 PROCEDURE — 99215 PR OFFICE/OUTPT VISIT, EST, LEVL V, 40-54 MIN: ICD-10-PCS | Mod: HCNC,S$GLB,, | Performed by: INTERNAL MEDICINE

## 2019-01-01 PROCEDURE — 71250 CT THORAX DX C-: CPT | Mod: 26,HCNC,, | Performed by: RADIOLOGY

## 2019-01-01 PROCEDURE — 94002 VENT MGMT INPAT INIT DAY: CPT | Mod: HCNC

## 2019-01-01 PROCEDURE — 99291 CRITICAL CARE FIRST HOUR: CPT | Mod: 25,,, | Performed by: EMERGENCY MEDICINE

## 2019-01-01 PROCEDURE — 99999 PR PBB SHADOW E&M-EST. PATIENT-LVL II: CPT | Mod: PBBFAC,HCNC,, | Performed by: OPHTHALMOLOGY

## 2019-01-01 PROCEDURE — 99292 CRITICAL CARE ADDL 30 MIN: CPT | Mod: 25,,, | Performed by: EMERGENCY MEDICINE

## 2019-01-01 PROCEDURE — 99291 CRITICAL CARE FIRST HOUR: CPT | Mod: 25,HCNC

## 2019-01-01 PROCEDURE — 25000003 PHARM REV CODE 250: Mod: HCNC | Performed by: INTERNAL MEDICINE

## 2019-01-01 PROCEDURE — 84439 ASSAY OF FREE THYROXINE: CPT | Mod: HCNC

## 2019-01-01 PROCEDURE — 25000003 PHARM REV CODE 250: Mod: HCNC

## 2019-01-01 PROCEDURE — 99292 PR CRITICAL CARE, ADDL 30 MIN: ICD-10-PCS | Mod: HCNC,25,, | Performed by: INTERNAL MEDICINE

## 2019-01-01 PROCEDURE — 80053 COMPREHEN METABOLIC PANEL: CPT | Mod: 91,HCNC

## 2019-01-01 PROCEDURE — 99291 PR CRITICAL CARE, E/M 30-74 MINUTES: ICD-10-PCS | Mod: 25,,, | Performed by: EMERGENCY MEDICINE

## 2019-01-01 PROCEDURE — 99999 PR PBB SHADOW E&M-EST. PATIENT-LVL IV: CPT | Mod: PBBFAC,HCNC,, | Performed by: INTERNAL MEDICINE

## 2019-01-01 PROCEDURE — 99215 OFFICE O/P EST HI 40 MIN: CPT | Mod: HCNC,S$GLB,, | Performed by: INTERNAL MEDICINE

## 2019-01-01 RX ORDER — NOREPINEPHRINE BITARTRATE/D5W 4MG/250ML
PLASTIC BAG, INJECTION (ML) INTRAVENOUS
Status: COMPLETED | OUTPATIENT
Start: 2019-01-01 | End: 2019-01-01

## 2019-01-01 RX ORDER — ROCURONIUM BROMIDE 10 MG/ML
INJECTION, SOLUTION INTRAVENOUS
Status: DISPENSED
Start: 2019-01-01 | End: 2019-01-01

## 2019-01-01 RX ORDER — INSULIN ASPART 100 [IU]/ML
0-5 INJECTION, SOLUTION INTRAVENOUS; SUBCUTANEOUS
Status: DISCONTINUED | OUTPATIENT
Start: 2019-01-01 | End: 2019-07-04 | Stop reason: HOSPADM

## 2019-01-01 RX ORDER — NOREPINEPHRINE BITARTRATE/D5W 4MG/250ML
PLASTIC BAG, INJECTION (ML) INTRAVENOUS
Status: DISPENSED
Start: 2019-01-01 | End: 2019-01-01

## 2019-01-01 RX ORDER — FENTANYL CITRATE 50 UG/ML
INJECTION, SOLUTION INTRAMUSCULAR; INTRAVENOUS
Status: DISCONTINUED
Start: 2019-01-01 | End: 2019-01-01 | Stop reason: WASHOUT

## 2019-01-01 RX ORDER — CHLORHEXIDINE GLUCONATE ORAL RINSE 1.2 MG/ML
15 SOLUTION DENTAL 2 TIMES DAILY
Status: DISCONTINUED | OUTPATIENT
Start: 2019-01-01 | End: 2019-07-04 | Stop reason: HOSPADM

## 2019-01-01 RX ORDER — ATROPINE SULFATE 0.1 MG/ML
0.5 INJECTION INTRAVENOUS ONCE AS NEEDED
Status: COMPLETED | OUTPATIENT
Start: 2019-01-01 | End: 2019-01-01

## 2019-01-01 RX ORDER — EPINEPHRINE 0.1 MG/ML
INJECTION INTRAVENOUS CODE/TRAUMA/SEDATION MEDICATION
Status: COMPLETED | OUTPATIENT
Start: 2019-01-01 | End: 2019-01-01

## 2019-01-01 RX ORDER — CARVEDILOL 25 MG/1
25 TABLET ORAL 2 TIMES DAILY
Qty: 180 TABLET | Refills: 1 | Status: SHIPPED | OUTPATIENT
Start: 2019-01-01 | End: 2019-01-01 | Stop reason: HOSPADM

## 2019-01-01 RX ORDER — HEPARIN SODIUM 5000 [USP'U]/ML
5000 INJECTION, SOLUTION INTRAVENOUS; SUBCUTANEOUS EVERY 8 HOURS
Status: DISCONTINUED | OUTPATIENT
Start: 2019-01-01 | End: 2019-07-04 | Stop reason: HOSPADM

## 2019-01-01 RX ORDER — IBUPROFEN 200 MG
24 TABLET ORAL
Status: DISCONTINUED | OUTPATIENT
Start: 2019-01-01 | End: 2019-07-04 | Stop reason: HOSPADM

## 2019-01-01 RX ORDER — SODIUM BICARBONATE 1 MEQ/ML
SYRINGE (ML) INTRAVENOUS CODE/TRAUMA/SEDATION MEDICATION
Status: COMPLETED | OUTPATIENT
Start: 2019-01-01 | End: 2019-01-01

## 2019-01-01 RX ORDER — POTASSIUM CHLORIDE 20 MEQ/15ML
60 SOLUTION ORAL
Status: DISCONTINUED | OUTPATIENT
Start: 2019-01-01 | End: 2019-07-04 | Stop reason: HOSPADM

## 2019-01-01 RX ORDER — INDOMETHACIN 25 MG/1
CAPSULE ORAL
Status: DISCONTINUED
Start: 2019-01-01 | End: 2019-01-01 | Stop reason: WASHOUT

## 2019-01-01 RX ORDER — IBUPROFEN 200 MG
16 TABLET ORAL
Status: DISCONTINUED | OUTPATIENT
Start: 2019-01-01 | End: 2019-07-04 | Stop reason: HOSPADM

## 2019-01-01 RX ORDER — FAMOTIDINE 10 MG/ML
20 INJECTION INTRAVENOUS DAILY
Status: DISCONTINUED | OUTPATIENT
Start: 2019-01-01 | End: 2019-07-04 | Stop reason: HOSPADM

## 2019-01-01 RX ORDER — GLUCAGON 1 MG
1 KIT INJECTION
Status: DISCONTINUED | OUTPATIENT
Start: 2019-01-01 | End: 2019-07-04 | Stop reason: HOSPADM

## 2019-01-01 RX ORDER — CALCIUM CHLORIDE INJECTION 100 MG/ML
INJECTION, SOLUTION INTRAVENOUS CODE/TRAUMA/SEDATION MEDICATION
Status: COMPLETED | OUTPATIENT
Start: 2019-01-01 | End: 2019-01-01

## 2019-01-01 RX ORDER — MAGNESIUM SULFATE HEPTAHYDRATE 40 MG/ML
2 INJECTION, SOLUTION INTRAVENOUS
Status: DISCONTINUED | OUTPATIENT
Start: 2019-01-01 | End: 2019-07-04 | Stop reason: HOSPADM

## 2019-01-01 RX ORDER — POTASSIUM CHLORIDE 20 MEQ/15ML
40 SOLUTION ORAL
Status: DISCONTINUED | OUTPATIENT
Start: 2019-01-01 | End: 2019-07-04 | Stop reason: HOSPADM

## 2019-01-01 RX ORDER — SODIUM CHLORIDE 9 MG/ML
1000 INJECTION, SOLUTION INTRAVENOUS
Status: COMPLETED | OUTPATIENT
Start: 2019-01-01 | End: 2019-01-01

## 2019-01-01 RX ORDER — THIAMINE HCL 100 MG
100 TABLET ORAL DAILY
Status: DISCONTINUED | OUTPATIENT
Start: 2019-01-01 | End: 2019-01-01

## 2019-01-01 RX ORDER — SODIUM CHLORIDE 0.9 % (FLUSH) 0.9 %
10 SYRINGE (ML) INJECTION
Status: DISCONTINUED | OUTPATIENT
Start: 2019-01-01 | End: 2019-07-04 | Stop reason: HOSPADM

## 2019-01-01 RX ORDER — AMLODIPINE BESYLATE 10 MG/1
TABLET ORAL
Qty: 90 TABLET | Refills: 0 | Status: SHIPPED | OUTPATIENT
Start: 2019-01-01 | End: 2019-01-01 | Stop reason: HOSPADM

## 2019-01-01 RX ORDER — ONDANSETRON 8 MG/1
8 TABLET, ORALLY DISINTEGRATING ORAL EVERY 8 HOURS PRN
Status: DISCONTINUED | OUTPATIENT
Start: 2019-01-01 | End: 2019-07-04 | Stop reason: HOSPADM

## 2019-01-01 RX ORDER — MAGNESIUM SULFATE HEPTAHYDRATE 40 MG/ML
4 INJECTION, SOLUTION INTRAVENOUS
Status: DISCONTINUED | OUTPATIENT
Start: 2019-01-01 | End: 2019-07-04 | Stop reason: HOSPADM

## 2019-01-01 RX ADMIN — PHYTONADIONE 10 MG: 10 INJECTION, EMULSION INTRAMUSCULAR; INTRAVENOUS; SUBCUTANEOUS at 11:07

## 2019-01-01 RX ADMIN — ATROPINE SULFATE 0.5 MG: 0.1 INJECTION PARENTERAL at 11:07

## 2019-01-01 RX ADMIN — PIPERACILLIN AND TAZOBACTAM 4.5 G: 4; .5 INJECTION, POWDER, LYOPHILIZED, FOR SOLUTION INTRAVENOUS; PARENTERAL at 06:07

## 2019-01-01 RX ADMIN — EPINEPHRINE 1 MG: 0.1 INJECTION, SOLUTION ENDOTRACHEAL; INTRACARDIAC; INTRAVENOUS at 11:07

## 2019-01-01 RX ADMIN — EPINEPHRINE 1 MG: 0.1 INJECTION, SOLUTION ENDOTRACHEAL; INTRACARDIAC; INTRAVENOUS at 12:07

## 2019-01-01 RX ADMIN — VANCOMYCIN HYDROCHLORIDE 1250 MG: 1.25 INJECTION, POWDER, LYOPHILIZED, FOR SOLUTION INTRAVENOUS at 06:07

## 2019-01-01 RX ADMIN — SODIUM BICARBONATE 50 MEQ: 84 INJECTION, SOLUTION INTRAVENOUS at 12:07

## 2019-01-01 RX ADMIN — CALCIUM CHLORIDE 1 G: 100 INJECTION, SOLUTION INTRAVENOUS at 11:07

## 2019-01-01 RX ADMIN — IOHEXOL 75 ML: 350 INJECTION, SOLUTION INTRAVENOUS at 10:07

## 2019-01-01 RX ADMIN — SODIUM CHLORIDE 500 ML: 0.9 INJECTION, SOLUTION INTRAVENOUS at 07:07

## 2019-01-01 RX ADMIN — SODIUM BICARBONATE 50 MEQ: 84 INJECTION, SOLUTION INTRAVENOUS at 11:07

## 2019-01-01 RX ADMIN — Medication 1 MCG/KG/MIN: at 12:07

## 2019-01-01 NOTE — PROGRESS NOTES
"Notified MD Steffi that pt refuses accuchecks before meals because pt states "my A1C is 6". Also notified MD that pt is still refusing PIV. MD stated she would make order that it was ok for pt to not have PIV. Will continue to monitor pt.  " Problem: Infant Inpatient Plan of Care  Goal: Patient-Specific Goal (Individualization)   of baby girl. Mother plans to breastfeed.   Outcome: Ongoing (interventions implemented as appropriate)  Infant breast fed well. CBC, Blood culture and stool collected and sent to lab for mothers +GBS but not covered within the four hour time span, positive THC result at beginning of pregnancy. Blood glucose protocol explained to parents for SGA with parents verbalizing understanding. All three transition medications and bath given. Ok to transfer to [facility].

## 2019-02-11 NOTE — PROGRESS NOTES
Subjective:       Patient ID: Beth Salcedo is a 73 y.o. female.    Chief Complaint: Diabetes    HPI   She has diabetes.  Denies polyuria, polydipsia    Past medical history: Hypertension, diabetes, hyperlipidemia, end-stage renal disease on dialysis, pulmonary hypertension, status post hysterectomy, status post unilateral adrenalectomy (benign)       Medications: Norvasc 10 mg daily, coreg 25 mg twice a day,  Cozaar 100 mg daily      ALLERGIES: Hydralazine, Zocor, Altace       Review of Systems   Constitutional: Negative for chills, fatigue, fever and unexpected weight change.   Respiratory: Negative for chest tightness and shortness of breath.    Cardiovascular: Negative for chest pain and palpitations.   Gastrointestinal: Negative for abdominal pain and blood in stool.   Neurological: Negative for dizziness, syncope, numbness and headaches.       Objective:      Physical Exam   HENT:   Right Ear: External ear normal.   Left Ear: External ear normal.   Nose: Nose normal.   Mouth/Throat: Oropharynx is clear and moist.   Eyes: Pupils are equal, round, and reactive to light.   Neck: Normal range of motion.   Cardiovascular: Normal rate and regular rhythm.   No murmur heard.  Pulmonary/Chest: Breath sounds normal.   Abdominal: She exhibits no distension. There is no hepatosplenomegaly. There is no tenderness.   Lymphadenopathy:     She has no cervical adenopathy.     She has no axillary adenopathy.   Neurological: She has normal strength and normal reflexes. No cranial nerve deficit or sensory deficit.       Assessment/Plan       Assessment and plan:  1.  Diabetes:  Check CMP, CBC, A1c  2.  Lung nodule:  Schedule CT scan of chest  3.  Discussed Pap smear, pelvic exam, colonoscopy, mammogram, breast exam, bone density.  She declined all

## 2019-02-12 NOTE — PROGRESS NOTES
Patient Beth Salcedo, MRN [id number], was dependent on dialysis (ICD10 Z99.2) at the time of this visit on 2/7/19. This addendum is made to the medical record on 02/11/2019.

## 2019-04-02 PROBLEM — I15.0 MALIGNANT RENOVASCULAR HYPERTENSION REQUIRING ACUTE INTENSIVE MANAGEMENT: Status: RESOLVED | Noted: 2017-08-27 | Resolved: 2019-01-01

## 2019-04-02 NOTE — LETTER
April 2, 2019      Emili Sanchez MD  [address]  [facility] [address]           [place] [facility]  [address] [place] [address]  Phone: [phone]          Patient: Beth Salcedo   MR Number: [id number]   YOB: 1945   Date of Visit: 4/2/2019       Dear Dr. Emili Sanchez:    Thank you for referring Beth Salcedo to me for evaluation. Attached you will find relevant portions of my assessment and plan of care.    If you have questions, please do not hesitate to call me. I look forward to following Beth Salcedo along with you.    Sincerely,    Wilian Salas MD    Enclosure  CC:  No Recipients    If you would like to receive this communication electronically, please contact [email or web address] or [phone] to request more information on [facility] access.    For providers and/or their staff who would like to refer a patient to [facility], please contact us through our one-stop-shop provider referral line, [facility], at [phone].    If you feel you have received this communication in error or would no longer like to receive these types of communications, please e-mail [email or web address]

## 2019-04-02 NOTE — PROGRESS NOTES
Subjective:   Patient ID:  Beht Salcedo is a 73 y.o. female who presents for follow up of Pulmonary Hypertension      HPI: Ms. Salcedo is back for f/u after 15 months.  She is nearly 3 years into hemodialysis care of her ESRD.  There has been no significant change in how she's feeling and she denies new chest discomfort, MENDEZ, palpitations, PND/orthopnea, lightheadedness and syncope.    Her two primary complaints are that she's had a globus sensation in her throat with a dry cough that's been persistent for for a couple of months, and restless leg symptoms.    She hasn't used her oxygen in a couple of months.  Her home O2 monitor reads about 5% below our monitor, and I pointed out that the battery level on her device was low.    She does not make urine.    She's having difficulties with her daughter, with whom she lives.          Jan 2018 HPI by Bharati Silverman: Ms. Salcedo is a 71yo female with ESRD on dialysis (started June 2016 MW), HTN, HLD, pulmonary HTN, aortic atherosclerosis, DMI, anemia of ESRD, and statin intolerance here for follow up. She reports MENDEZ on days when she knows she has eaten a high salt meal (crawfish, charbroiled oysters) and right before dialysis. Ms. Salcedo denies chest pain with exertion or at rest, palpitations, dizziness, syncope, leg edema, claudication, PND, or orthopnea.  She has questions regarding her pulmonary hypertension, which was cited as a reason that she was unable to obtain access for peritoneal dialysis in September. She says her surgery was also canceled because her oxygen sats were in the 70s, although she says she is in the upper 80s on her home pulse oximeter in recent months.       She is currently taking ASA 81mg for CAD management. She has a history of statin intolerance. .2 on 6/28/2017. She is also taking amlodipine 10mg, carvedilol 12.5mg BID, clonidine patch 0.3mg weekly, and losartan 100mg.  BPs are labile, ranging from the  130s to over 200 systolic per clinic measures. Creatinine is 7.2 on 9/2/2017. K is 4.9. Hepatic transaminases are within normal limits. HA1C is 6. She has chronic anemia which is stable. Oxygen saturation is 91%.    My Jan 2018 attestation: Ms. Salcedo appears generally to be in much better health than her dialysis-dependence, relative hypoxia, and severe pulmonary hypertension suggests.  We will focus on lowering her BP and HR as much as we can with dialysis keeping her euvolemic.  We will also get her scheduled for the PFTs that were ordered by Dr. Chase.    Recent Cardiac Tests:  2D Echo (8/27/2017):  CONCLUSIONS     1 - Severe left atrial enlargement.     2 - Moderate right atrial enlargement.     3 - Eccentric hypertrophy.     4 - Normal left ventricular systolic function (EF 60-65%).     5 - Right ventricular enlargement with mildly depressed systolic function.     6 - Moderate tricuspid regurgitation.     7 - Pulmonary hypertension. The estimated PA systolic pressure is 80 mmHg.     8 - Trivial pericardial effusion.     Patient Active Problem List   Diagnosis    Mixed hyperlipidemia    Cataract, bilateral    Type 2 diabetes mellitus with stage 5 chronic kidney disease and hypertension    Atherosclerosis of aorta    Anemia of chronic renal failure, stage 5    Hypervolemia    ESRD (end stage renal disease) on dialysis    Essential hypertension    Secondary hyperparathyroidism of renal origin    LVH (left ventricular hypertrophy)    Diastolic heart failure    Hemodialysis access, AV graft    Venous insufficiency of both lower extremities    PAOD (peripheral arterial occlusive disease)    Statin intolerance    ESRD (end stage renal disease)    Pleural effusion    Pulmonary nodules    Adrenal adenoma    Pulmonary hypertension    Dialysis complication    (HFpEF) heart failure with preserved ejection fraction    Vitamin D deficiency    Moderate tricuspid regurgitation    Biatrial  enlargement    Nuclear sclerosis of both eyes    DM type 2 without retinopathy    Cortical cataract of both eyes    Refractive error    Senile nuclear sclerosis    Post-operative state       Current Outpatient Medications   Medication Sig    amLODIPine (NORVASC) 10 MG tablet TAKE ONE DAILY (Patient taking differently: TAKE ONE DAILY, morning)    aspirin 81 mg Tab Take 1 tablet by mouth nightly.     blood sugar diagnostic Strp Test 3 times daily. Please dispense 1 meter covered by insurance. Meter, Strips, and lancets    carvedilol (COREG) 25 MG tablet Take 1 tablet (25 mg total) by mouth 2 (two) times daily.    cholecalciferol, vitamin D3, (VITAMIN D3) 2,000 unit Cap Take 1 capsule by mouth every morning. Is unsure of the dosage    losartan (COZAAR) 100 MG tablet TAKE ONE BY MOUTH AT BEDTIME (Patient taking differently: TAKE ONE BY MOUTH every morning)    sevelamer carbonate (RENVELA) 800 mg Tab Take 2 tablets (1,600 mg total) by mouth 3 (three) times daily with meals.     No current facility-administered medications for this visit.      Facility-Administered Medications Ordered in Other Visits   Medication    0.9%  NaCl infusion    cyclopentolate 1% ophthalmic solution 1 drop    phenylephrine HCL 2.5% ophthalmic solution 1 drop    proparacaine 0.5 % ophthalmic solution 1 drop    tropicamide 1% ophthalmic solution 1 drop       Review of Systems   Constitution: Negative.   HENT: Negative.    Eyes: Negative.    Cardiovascular: Negative.  Negative for chest pain, dyspnea on exertion, near-syncope, orthopnea and palpitations.   Respiratory: Negative.  Negative for cough, hemoptysis and shortness of breath.    Endocrine: Negative.    Hematologic/Lymphatic: Negative.    Skin: Negative.    Musculoskeletal: Negative.    Gastrointestinal: Negative.    Genitourinary: Negative.    Neurological: Negative.    Psychiatric/Behavioral: Negative.      Objective:   Physical Exam   Constitutional: She is oriented to  person, place, and time. She appears well-developed and well-nourished.   HENT:   Head: Normocephalic and atraumatic.   Nose: Nose normal.   Mouth/Throat: Oropharynx is clear and moist.   Eyes: Pupils are equal, round, and reactive to light. Conjunctivae and EOM are normal. No scleral icterus.   Neck: Normal range of motion. Neck supple. JVD (Mildly elevated - might be V waves - seen just above the clavicle with her sitting upright) present. Carotid bruit is not present.   Cardiovascular: Normal rate, regular rhythm, S1 normal, S2 normal and intact distal pulses. Exam reveals no gallop and no friction rub.   Murmur (Fairly holosystolic murmur throughout the precordium, loudest at the left sternal border.  Pronounced P2.) heard.   Harsh midsystolic murmur is present at the upper right sternal border and upper left sternal border.  Pulses:       Carotid pulses are 2+ on the right side, and 2+ on the left side.       Radial pulses are 2+ on the right side, and 2+ on the left side.   Left femoral AVG.   Pulmonary/Chest: Effort normal and breath sounds normal. No respiratory distress. She has no wheezes. She has no rales.   Abdominal: Soft. Bowel sounds are normal. She exhibits no distension. There is no tenderness.   Musculoskeletal: Normal range of motion. She exhibits no edema.   Neurological: She is alert and oriented to person, place, and time.   Skin: Skin is warm and dry. No rash noted. No erythema.   Psychiatric: She has a normal mood and affect. Her speech is normal and behavior is normal. Judgment and thought content normal.   Nursing note and vitals reviewed.      Lab Results   Component Value Date    WBC 6.65 08/31/2017    HGB 10.8 (L) 08/31/2017    HCT 32.9 (L) 08/31/2017    MCV 93 08/31/2017     08/31/2017         Chemistry        Component Value Date/Time     (L) 09/02/2017 0606    K 4.9 09/02/2017 0606     09/02/2017 0606    CO2 23 09/02/2017 0606    BUN 61 (H) 09/02/2017 0606     CREATININE 7.2 (H) 09/02/2017 0606     (H) 09/02/2017 0606        Component Value Date/Time    CALCIUM 10.2 09/02/2017 0606    ALKPHOS 132 08/26/2017 1517    AST 24 08/26/2017 1517    ALT 49 (H) 08/26/2017 1517    BILITOT 0.7 08/26/2017 1517    ESTGFRAFRICA 5.9 (A) 09/02/2017 0606    EGFRNONAA 5.1 (A) 09/02/2017 0606            Lab Results   Component Value Date    CHOL 180 06/28/2017    CHOL 294 (H) 08/19/2015    CHOL 342 (H) 10/04/2014     Lab Results   Component Value Date    HDL 46 06/28/2017    HDL 42 08/19/2015    HDL 41 10/04/2014     Lab Results   Component Value Date    LDLCALC 114.2 06/28/2017    LDLCALC 216.4 (H) 08/19/2015    LDLCALC 257.2 (H) 10/04/2014     Lab Results   Component Value Date    TRIG 99 06/28/2017    TRIG 178 (H) 08/19/2015    TRIG 219 (H) 10/04/2014     Lab Results   Component Value Date    CHOLHDL 25.6 06/28/2017    CHOLHDL 14.3 (L) 08/19/2015    CHOLHDL 12.0 (L) 10/04/2014       Lab Results   Component Value Date    TSH 3.860 12/14/2016       Lab Results   Component Value Date    HGBA1C 6.0 (H) 08/30/2017       Assessment:     1. (HFpEF) heart failure with preserved ejection fraction    2. Atherosclerosis of aorta    3. Biatrial enlargement    4. Essential hypertension    5. LVH (left ventricular hypertrophy)    6. Mixed hyperlipidemia    7. Moderate tricuspid regurgitation    8. PAOD (peripheral arterial occlusive disease)    9. Pulmonary hypertension    10. Type 2 diabetes mellitus with stage 5 chronic kidney disease and hypertension    11. Venous insufficiency of both lower extremities    12. ESRD (end stage renal disease) on dialysis    13. Anemia of chronic renal failure, stage 5    14. Statin intolerance        Plan:     Continue current medicines.    Diet/exercise goals reinforced.    F/U 12 months

## 2019-05-08 PROBLEM — Z96.1 PSEUDOPHAKIA: Status: ACTIVE | Noted: 2019-01-01

## 2019-05-08 NOTE — PROGRESS NOTES
Subjective:       Patient ID: Beth Salcedo is a 73 y.o. female.    Chief Complaint: Eye Exam    HPI     DSL- 9/19/18     72 y/o female is here for eye exam. Pt states VA is well. Eyes has been   teary. Denies floaters and flashes. Pt have glasses OTC but she does not   wear them.     Eyemeds  No gtts    Last edited by Chhaya Cristina on 5/8/2019 10:10 AM. (History)             Assessment:       1. Controlled type 2 diabetes mellitus with both eyes affected by moderate nonproliferative retinopathy without macular edema, without long-term current use of insulin    2. Essential hypertension    3. Pseudophakia        Plan:       Mod NPDR OU-No CSME.  HTN-No retinopathy OU.      Control DM & HTN.  RTC 1 yr.

## 2019-07-01 PROBLEM — I95.9 HYPOTENSION: Status: ACTIVE | Noted: 2019-01-01

## 2019-07-01 PROBLEM — R74.01 TRANSAMINITIS: Status: ACTIVE | Noted: 2019-01-01

## 2019-07-01 PROBLEM — R00.1 BRADYCARDIA: Status: ACTIVE | Noted: 2019-01-01

## 2019-07-01 PROBLEM — E87.20 LACTIC ACIDOSIS: Status: ACTIVE | Noted: 2019-01-01

## 2019-07-01 PROBLEM — R79.89 ELEVATED TROPONIN: Status: ACTIVE | Noted: 2019-01-01

## 2019-07-01 PROBLEM — R57.9 SHOCK: Status: ACTIVE | Noted: 2019-01-01

## 2019-07-01 PROBLEM — R79.1 ELEVATED INR: Status: ACTIVE | Noted: 2019-01-01

## 2019-07-01 NOTE — SUBJECTIVE & OBJECTIVE
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, without long-term current use of insulin     Diabetes mellitus type II     Diastolic heart failure     ESRD on dialysis since 6/10/16     Essential hypertension     Hyperlipidemia     Hypertension     Hypervolemia     LVH (left ventricular hypertrophy)     Moderate tricuspid regurgitation     PAOD (peripheral arterial occlusive disease)     Pleural effusion     Pulmonary hypertension     Pulmonary nodules     Secondary hyperparathyroidism of renal origin     Venous insufficiency of both lower extremities     Vitamin D deficiency        Past Surgical History:   Procedure Laterality Date    ADRENAL GLAND SURGERY  1990s    removed for tumor which she states was benign     AV graft      BIOPSY-KIDNEY Bilateral 1/29/2016    Performed by [facility] at [facility] OR 2ND FLR    CATARACT EXTRACTION W/  INTRAOCULAR LENS IMPLANT Right 08/07/2018    Dr. Kaye    HYSTERECTOMY  1990s    removed for tumor which she states was benign     INSERTION, INTRAOCULAR LENS PROSTHESIS Left 8/21/2018    Performed by Abdi Kaye MD at [facility] OR 1ST FLR    INSERTION, INTRAOCULAR LENS PROSTHESIS Right 8/7/2018    Performed by Abdi [facility] MD at [facility] OR 1ST FLR    KZKMGNDSZ-[id number]-[id number] Left 11/9/2016    Performed by CHARLI Rey III, MD at [facility] OR 2ND FLR    [id number]-[id number] Femoral - Right vs Left Left 2/20/2017    Performed by CHARLI Rey III, MD at [facility] OR 2ND FLR    [id number]-EKVLMPQ-YW Left 11/10/2016    Performed by CHARLI Rey III, MD at [facility] OR 2ND FLR  "   permcath placement      PHACOEMULSIFICATION, CATARACT Left 8/21/2018    Performed [facility] at [facility] OR 1ST FLR    PHACOEMULSIFICATION, CATARACT Right 8/7/2018    Performed by Abdi Kay[facility] MD at [facility] OR 1ST FLR       Review of patient's allergies indicates:   Allergen Reactions    Ramipril Other (See Comments)     Other reaction(s): deathly ill      Simvastatin Other (See Comments)     Other reaction(s): Deathly ill      Hydralazine Other (See Comments)     Pt. states "tiredness."       Current Facility-Administered Medications   Medication Frequency    piperacillin-tazobactam 4.5 g in sodium chloride 0.9% 100 mL IVPB (ready to mix system) ED 1 Time    vancomycin 1.25 g in dextrose 5% 250 mL IVPB (ready to mix) ED 1 Time     Current Outpatient Medications   Medication    amLODIPine (NORVASC) 10 MG tablet    aspirin 81 mg Tab    blood sugar diagnostic Strp    carvedilol (COREG) 25 MG tablet    cholecalciferol, vitamin D3, (VITAMIN D3) 2,000 unit Cap    losartan (COZAAR) 100 MG tablet    sevelamer carbonate (RENVELA) 800 mg Tab     Facility-Administered Medications Ordered in Other Encounters   Medication Frequency    0.9%  NaCl infusion Continuous    cyclopentolate 1% ophthalmic solution 1 drop On Call Procedure    phenylephrine HCL 2.5% ophthalmic solution 1 drop On Call Procedure    proparacaine 0.5 % ophthalmic solution 1 drop On Call Procedure    tropicamide 1% ophthalmic solution 1 drop On Call Procedure     Family History     Problem Relation (Age of Onset)    Cataracts Cousin    Diabetes Paternal Aunt    Pneumonia Brother        Tobacco Use    Smoking status: Never Smoker    Smokeless tobacco: Never Used   Substance and Sexual Activity    Alcohol use: No     Comment: quit since she started dialysis; previously was only having wine occasionally and in her younger years would have whiskey/soda, etc    Drug use: No    Sexual activity: Not on file     Review " "of Systems  Objective:     Vital Signs (Most Recent):  Temp: 97.8 °F (36.6 °C) (07/01/19 1801)  Pulse: (!) 48 (07/01/19 1801)  Resp: (!) 24 (07/01/19 1801)  BP: (!) 89/44 (07/01/19 1801)  SpO2: 96 % (07/01/19 1801)  O2 Device (Oxygen Therapy): room air (07/01/19 1801) Vital Signs (24h Range):  Temp:  [97.8 °F (36.6 °C)] 97.8 °F (36.6 °C)  Pulse:  [42-48] 48  Resp:  [24] 24  SpO2:  [94 %-96 %] 96 %  BP: (89-95)/(40-54) 89/44     Weight: 63.5 kg (140 lb) (07/01/19 1700)  Body mass index is 27.34 kg/m².  Body surface area is 1.64 meters squared.    No intake/output data recorded.    Physical Exam    Significant Labs:  {Select Labs:77896::"All labs within the past 24 hours have been reviewed."}    Significant Imaging:  {Results; Diagnostics:[id number]::"***"}  "

## 2019-07-01 NOTE — ED PROVIDER NOTES
"Encounter Date: 7/1/2019    SCRIBE #1 NOTE: I, Devika Matson, am scribing for, and in the presence of,  Dr. Carpenter. I have scribed the following portions of the note - the EKG reading and the Resident attestation.       History     Chief Complaint   Patient presents with    Shortness of Breath     arrived via EMS with c/o sob onset while at dialysis, hypotensive and bradycardic on scene, did not receive dialysis, received 1L ns per dialysis center     Pt is a 74 year-old female with a history of ESRD on MWF dialysis, diabetes mellitus type II, hypertension, and many other comorbidities who presented to the ED with hypotension and bradycardia. Patient has been adjusting anti-hypertensive medications with her nephrologist due to low blood pressure and is now supposed to only be taking carvedilol. This morning she felt a sensation of "head tightness" and thought her blood pressure was elevated, so she also took her leftover amlodipine and losartan, which she says resolved her "head tightness". She arrived to dialysis and her systolic blood pressure was in the 70s and her heart rate in the 40s. Dialysis was not done and she was given 1L of normal saline and her systolic blood pressure increased to 90. She was also noted to have shortness of breath and was put on 3L NC in the ambulance. She denies any chest pain, shortness of breath, headache, fevers, or leg swelling.        Review of patient's allergies indicates:   Allergen Reactions    Ramipril Other (See Comments)     Other reaction(s): deathly ill      Simvastatin Other (See Comments)     Other reaction(s): Deathly ill      Hydralazine Other (See Comments)     Pt. 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 without macular edema, without long-term current use of insulin     Diabetes mellitus type II     Diastolic heart failure     ESRD on dialysis since 6/10/16     Essential hypertension     Hyperlipidemia     Hypertension     Hypervolemia     LVH (left ventricular hypertrophy)     Moderate tricuspid regurgitation     PAOD (peripheral arterial occlusive disease)     Pleural effusion     Pulmonary hypertension     Pulmonary nodules     Secondary hyperparathyroidism of renal origin     Venous insufficiency of both lower extremities     Vitamin D deficiency      Past Surgical History:   Procedure Laterality Date    ADRENAL GLAND SURGERY  1990s    removed for tumor which she states was benign     AV graft      BIOPSY-KIDNEY Bilateral 1/29/2016    Performed by [facility]    CATARACT EXTRACTION W/  INTRAOCULAR LENS IMPLANT Right 08/07/2018    Dr. Kaye    HYSTERECTOMY  1990s    removed for tumor which she states was benign     INSERTION, INTRAOCULAR LENS PROSTHESIS Left 8/21/2018    [facility]    INSERTION, INTRAOCULAR LENS PROSTHESIS Right 8/7/2018    [facility]    CXPDMKEGS-EEHVU-VSUIBFXFAOUFC Left 11/9/2016    Performed by [facility]LI [facility] III, [facility]ND [facility]LR    TRNFZDAJX-WCMXV-NUBMMLOVFADEI Femoral - Right vs Left Left 2/20/2017    Per[facility] by [facility] OR [facility]ND [facility]LR    LEBCCTDQ-XQEBWYO-TS Left 11/10/2016    Performed by [facility] OR 2ND [facility]LR    permcath placement      PHACOEMULSIFICATION, CATARACT Left 8/21/2018    [facility]    PHACOEMULSIFICATION, CATARACT Right 8/7/2018    [facility]     Family History    Problem Relation Age of Onset    Pneumonia Brother     Diabetes Paternal Aunt     Cataracts Cousin     Kidney disease Neg Hx     Amblyopia Neg Hx     Blindness Neg Hx     Glaucoma Neg Hx     Macular degeneration Neg Hx     Retinal detachment Neg Hx     Strabismus Neg Hx     Stroke Neg Hx      Social History     Tobacco Use    Smoking status: Never Smoker    Smokeless tobacco: Never Used   Substance Use Topics    Alcohol use: No     Comment: quit since she started dialysis; previously was only having wine occasionally and in her younger years would have whiskey/soda, etc    Drug use: No     Review of Systems   Constitutional: Negative for chills and fever.   HENT: Negative for congestion.    Respiratory: Negative for cough and shortness of breath.    Cardiovascular: Negative for chest pain.   Gastrointestinal: Negative for abdominal pain, diarrhea, nausea and vomiting.   Genitourinary:        Anuric   Musculoskeletal: Positive for neck pain.   Skin: Negative for rash.   Neurological: Negative for headaches.   Psychiatric/Behavioral: Negative for confusion.       Physical Exam     Initial Vitals   BP Pulse Resp Temp SpO2   07/01/19 1700 07/01/19 1700 07/01/19 1801 07/01/19 1700 07/01/19 1700   (!) 90/40 (!) 42 (!) 24 97.8 °F (36.6 °C) 96 %      MAP       --                Physical Exam    Constitutional: She appears well-nourished. She appears distressed.   HENT:   Head: Normocephalic and atraumatic.   Eyes: EOM are normal.   Neck: Normal range of motion. No tracheal deviation present.   Cardiovascular: Normal pulses. Bradycardia present.    Murmur heard.   Systolic murmur is present with a grade of 3/6.  Pulmonary/Chest: Breath sounds normal. She is in respiratory distress (cannot speak in full sentences without dyspnea). She has no wheezes. She has no rhonchi. She has no rales.   Abdominal: Soft. Bowel sounds are normal. There is no tenderness.   Musculoskeletal: Normal range of motion. She exhibits  no edema.        Right ankle: She exhibits swelling (1+ pitting edema).        Left ankle: She exhibits swelling (1+ pitting edema).   AV fistuae present on left elbow and left thigh   Neurological: She is alert and oriented to person, place, and time.   Skin: Skin is warm and dry.         ED Course   Critical Care  Date/Time: 7/1/2019 11:01 PM  Performed by: Miguel Carpenter MD  Authorized by: Miguel Carpenter MD   Direct patient critical care time: 30 minutes  Additional history critical care time: 5 minutes  Ordering / reviewing critical care time: 15 minutes  Documentation critical care time: 15 minutes  Consulting other physicians critical care time: 5 minutes  Consult with family critical care time: 5 minutes  Total critical care time (exclusive of procedural time) : 75 minutes  Critical care was necessary to treat or prevent imminent or life-threatening deterioration of the following conditions: sepsis and circulatory failure.  Critical care was time spent personally by me on the following activities: evaluation of patient's response to treatment, obtaining history from patient or surrogate, ordering and review of laboratory studies, pulse oximetry, review of old charts, examination of patient, ordering and performing treatments and interventions, ordering and review of radiographic studies and re-evaluation of patient's condition.        Labs Reviewed   CBC W/ AUTO DIFFERENTIAL - Abnormal; Notable for the following components:       Result Value    WBC 14.73 (*)     RBC 3.54 (*)     Hemoglobin 11.7 (*)     Hematocrit 36.6 (*)     Mean Corpuscular Volume 103 (*)     Mean Corpuscular Hemoglobin 33.1 (*)     RDW 15.1 (*)     Platelets 83 (*)     Immature Granulocytes 0.8 (*)     Gran # (ANC) 12.7 (*)     Immature Grans (Abs) 0.12 (*)     Lymph # 0.9 (*)     Gran% 86.3 (*)     Lymph% 6.0 (*)     All other components within normal limits   COMPREHENSIVE METABOLIC PANEL - Abnormal; Notable for the  following components:    CO2 16 (*)     BUN, Bld 27 (*)     Creatinine 4.8 (*)     Albumin 3.4 (*)     Total Bilirubin 1.2 (*)      (*)      (*)     Anion Gap 19 (*)     eGFR if  9.5 (*)     eGFR if non  8.2 (*)     All other components within normal limits   LACTIC ACID, PLASMA - Abnormal; Notable for the following components:    Lactate (Lactic Acid) 7.0 (*)     All other components within normal limits   PROTIME-INR - Abnormal; Notable for the following components:    Prothrombin Time 21.0 (*)     INR 2.2 (*)     All other components within normal limits   TSH - Abnormal; Notable for the following components:    TSH 6.156 (*)     All other components within normal limits   TROPONIN I - Abnormal; Notable for the following components:    Troponin I 3.628 (*)     All other components within normal limits   B-TYPE NATRIURETIC PEPTIDE - Abnormal; Notable for the following components:    BNP >4,900 (*)     All other components within normal limits   PHOSPHORUS - Abnormal; Notable for the following components:    Phosphorus 5.4 (*)     All other components within normal limits   LACTIC ACID, PLASMA - Abnormal; Notable for the following components:    Lactate (Lactic Acid) 5.1 (*)     All other components within normal limits   TROPONIN I - Abnormal; Notable for the following components:    Troponin I 2.605 (*)     All other components within normal limits   CBC W/ AUTO DIFFERENTIAL - Abnormal; Notable for the following components:    RBC 3.57 (*)     Hemoglobin 11.8 (*)     Mean Corpuscular Volume 111 (*)     Mean Corpuscular Hemoglobin 33.1 (*)     Mean Corpuscular Hemoglobin Conc 29.9 (*)     RDW 15.0 (*)     Platelets 41 (*)     Immature Granulocytes 2.9 (*)     Gran # (ANC) 9.0 (*)     Immature Grans (Abs) 0.36 (*)     nRBC 1 (*)     Lymph% 16.3 (*)     Platelet Estimate Decreased (*)     All other components within normal limits   COMPREHENSIVE METABOLIC PANEL -  Abnormal; Notable for the following components:    Sodium 135 (*)     Potassium 5.6 (*)     CO2 15 (*)     Glucose 182 (*)     BUN, Bld 35 (*)     Creatinine 5.2 (*)     Calcium 12.7 (*)     Albumin 2.9 (*)     Total Bilirubin 1.1 (*)      (*)      (*)     Anion Gap 19 (*)     eGFR if  8.6 (*)     eGFR if non  7.5 (*)     All other components within normal limits   LACTIC ACID, PLASMA - Abnormal; Notable for the following components:    Lactate (Lactic Acid) 11.8 (*)     All other components within normal limits    Narrative:     *Critical value -   Results called to and read back by:PRABHA MCCRAY RN, 07/02/2019 00:40   TROPONIN I - Abnormal; Notable for the following components:    Troponin I 3.277 (*)     All other components within normal limits   PHOSPHORUS - Abnormal; Notable for the following components:    Phosphorus 7.1 (*)     All other components within normal limits   PROTIME-INR - Abnormal; Notable for the following components:    Prothrombin Time 24.3 (*)     INR 2.5 (*)     All other components within normal limits   APTT - Abnormal; Notable for the following components:    aPTT 59.4 (*)     All other components within normal limits   CBC W/ AUTO DIFFERENTIAL - Abnormal; Notable for the following components:    WBC 22.19 (*)     RBC 3.52 (*)     Hemoglobin 11.6 (*)     Hematocrit 36.5 (*)     Mean Corpuscular Volume 104 (*)     Mean Corpuscular Hemoglobin 33.0 (*)     Mean Corpuscular Hemoglobin Conc 31.8 (*)     RDW 15.1 (*)     Platelets 55 (*)     Immature Granulocytes 3.9 (*)     Gran # (ANC) 17.7 (*)     Immature Grans (Abs) 0.86 (*)     Mono # 1.4 (*)     nRBC 1 (*)     Gran% 79.8 (*)     Lymph% 9.4 (*)     All other components within normal limits   ISTAT PROCEDURE - Abnormal; Notable for the following components:    POC PH 7.347 (*)     POC PCO2 32.2 (*)     POC PO2 69 (*)     POC HCO3 17.7 (*)     POC SATURATED O2 93 (*)     POC TCO2 19 (*)     All  other components within normal limits   ISTAT PROCEDURE - Abnormal; Notable for the following components:    POC PH 7.187 (*)     POC PCO2 50.4 (*)     POC PO2 33 (*)     POC HCO3 19.1 (*)     POC SATURATED O2 49 (*)     POC TCO2 21 (*)     All other components within normal limits   ISTAT LACTATE - Abnormal; Notable for the following components:    POC Lactate 12.10 (*)     All other components within normal limits   POCT GLUCOSE - Abnormal; Notable for the following components:    POCT Glucose 205 (*)     All other components within normal limits   ISTAT PROCEDURE - Abnormal; Notable for the following components:    POC Glucose 150 (*)     POC Creatinine 3.2 (*)     POC Potassium 3.2 (*)     POC Chloride >140 (*)     POC TCO2 (MEASURED) 13 (*)     POC Ionized Calcium >2.50 (*)     POC Hematocrit 18 (*)     All other components within normal limits   CULTURE, BLOOD    Narrative:     Aerobic and anaerobic   CULTURE, BLOOD    Narrative:     Aerobic and anaerobic   MAGNESIUM   T4, FREE   MAGNESIUM     EKG Readings: (Independently Interpreted)   Sinus bradycardia with a rate of 51. Prolonged QT. No STEMI.     ECG Results          EKG 12-lead (Final result)  Result time 07/02/19 13:59:09    Final result by Interface, Lab In [facility] (07/02/19 13:59:09)                 Narrative:    Test Reason : R00.1,    Vent. Rate : 119 BPM     Atrial Rate : 238 BPM     P-R Int : 000 ms          QRS Dur : 090 ms      QT Int : 358 ms       P-R-T Axes : 000 255 094 degrees     QTc Int : 503 ms    Atrial flutter with 2:1 A-V conduction  ST elevation in V1  Cannot rule out STEMI  Right superior axis deviation  Pulmonary disease pattern  Right ventricular hypertrophy  Nonspecific ST and T wave abnormality  Abnormal ECG  When compared with ECG of 01-JUL-2019 17:42,  Significant changes have occurred  Confirmed by JAGRUTI PINEDA MD ([id number]) on 7/2/2019 1:59:00 PM    Referred By: RADHA   SELF           Confirmed By:JAGRUTI PINEDA MD                              EKG 12-lead (Final result)  Result time 07/02/19 12:31:39    Final result by Interface, Lab In [facility] (07/02/19 12:31:39)                 Narrative:    Test Reason : R00.1,    Vent. Rate : 051 BPM     Atrial Rate : 051 BPM     P-R Int : 190 ms          QRS Dur : 086 ms      QT Int : 536 ms       P-R-T Axes : 072 186 -13 degrees     QTc Int : 494 ms    Sinus bradycardia  Possible Left atrial enlargement  Right superior axis deviation  Right ventricular hypertrophy  Nonspecific ST abnormality  Nonspecific T wave abnormality  Prolonged QT  Abnormal ECG  When compared with ECG of 26-AUG-2017 14:48,  Significant changes have occurred  Confirmed by JAGRUTI PINEDA MD ([id number]) on 7/2/2019 12:31:30 PM    Referred By: [email or web address]   SELF           Confirmed By:JAGRUTI PINEDA MD                            Imaging Results          CTA Chest Non Coronary (Final result)  Result time 07/02/19 00:31:36    Final result by Richie Mata MD (07/02/19 00:31:36)                 Impression:      No evidence of pulmonary thromboembolism.    Moderate right-sided pleural effusion, increased in size since CT chest 02/14/2019.  Compressive atelectasis of the right lower lobe.  Trace left pleural fluid, unchanged.    Cardiomegaly with significant dilation of the right atrium and reflux of contrast into the IVC and hepatic veins, suggestive of cardiac dysfunction.    Other incidental findings as above.    Electronically signed by resident: Hugo Irene MD  Date:    07/01/2019  Time:    23:05    Electronically signed by: Richie Mata MD  Date:    07/02/2019  Time:    00:31             Narrative:    EXAMINATION:  CTA CHEST NON CORONARY    CLINICAL HISTORY:  Dyspnea, cardiac origin suspected;Chest pain, acute, PE suspected, low pretest prob;    TECHNIQUE:  Low dose axial images, sagittal and coronal reformations were obtained from the thoracic inlet to the lung bases following the IV administration of 75 mL of Omnipaque 350.   Contrast timing was optimized to evaluate the pulmonary arteries.    COMPARISON:  CT chest without contrast 02/14/2019, 08/26/2017.    FINDINGS:  Pulmonary vasculature: Satisfactory opacification of the pulmonary arterial system with no filling defect to the segmental level.    Aorta: Left-sided aortic arch.  Severe calcified atherosclerotic changes the aorta with no significant aneurysmal dilatation.    Base of Neck: No significant abnormality.    Thoracic soft tissues: Normal.    Heart: Enlarged, particularly the right atrium and ventricle.  Trace pericardial effusion.  Coronary artery calcifications.    Beatrice/Mediastinum: No pathologic jimbo enlargement.    Airways: Patent.    Lungs/Pleura: Breathing motion artifact.    Moderate right-sided pleural effusion which has increased in size since the prior examination dated 02/14/2019.  Trace left pleural fluid, unchanged.  Compressive atelectasis of the basal segments of the right lower lobe.  The left lung is clear.    Esophagus: Normal.    Upper Abdomen: Trace perihepatic free fluid.  Surgical clips at the expected area of the left adrenal gland.  Reflux of contrast into the IVC and hepatic veins.    Bones: No acute fracture. No suspicious lytic or sclerotic lesions.                               X-Ray Chest AP Portable (Final result)  Result time 07/01/19 18:24:22    Final result by Maxine Gilbert MD (07/01/19 18:24:22)                 Impression:      As above      Electronically signed by: Maxine Gilbert MD  Date:    07/01/2019  Time:    18:24             Narrative:    EXAMINATION:  XR CHEST AP PORTABLE    CLINICAL HISTORY:  Chest Pain;    TECHNIQUE:  Single frontal view of the chest was performed.    COMPARISON:  January 30, 2018    FINDINGS:  Cardiac size is enlarged, slightly more prominent compared to prior study even with change in technique.  There is calcification along the wall of the aorta.  Small bilateral pleural effusions appear similar to  previous study.  There is prominence of the central pulmonary vasculature and there are increased interstitial markings within the lungs bilaterally, findings likely related to edema.  Changes within the distal right clavicle suggest remote fracture.  There are surgical clips in the upper abdomen.                                 Medical Decision Making:   History:   I obtained history from: someone other than patient.       <> Summary of History: Pt is a 74 year old female with ESRD on dialysis, hypertension, COPD, diabetes mellitus type II, and many other comorbidities.  Old Medical Records: I decided to obtain old medical records.  Initial Assessment:   My differential diagnoses for this patient include but are not limited to hypotension due to antihypertensive overdose or sepsis, MI, CHF exacerbation, and COPD exacerbation. We are ordering a CBC, CMP, and lactic acid to rule out an infectious process. We are ordering an EKG, troponin and BNP to evaluate for MI or CHF exacerbation. We are ordering a portable chest X-ray to evaluate for CHF and COPD exacerbations. We are evaluating her bradycardia with a TSH because of her history of thyroid disease and the patient not being on any thyroid medication. We are also evaluating her ESRD with a CMP, magnesium and phosphorus. Since she received 1L of fluid at the dialysis center and has had a stable systolic blood pressure since then, we are not giving fluids for now because we do not want to fluid overload her and worsen her shortness of breath.  Clinical Tests:   Lab Tests: Ordered and Reviewed       <> Summary of Lab: Troponin and lactate were markedly elevated. CBC showed leukocytosis. CMP, PT/INR and phosphorus were consistent with ESRD.   Radiological Study: Ordered and Reviewed  Medical Tests: Ordered and Reviewed  ED Management:  6:05 PM - ECG received. Shows sinus bradycardia of 51 bpm but is negative for any ST changes.  6:27 PM - INR, PT elevated consistent  with ESRD on dialysis. Pt is anemic with WBC of 13 and lactic acid 7.0. Code sepsis was called. CXR read is not back yet but the image shows pulmonary congestion and infiltrates so I suspect pulmonary etiology. We will hold fluid resuscitation because of ESRD status and has already received 1L NS today before arriving to ED. We will get blood cultures and start empiric abx ceftriaxone and azithromycin.  6:44 PM - Pt's daughter arrived and said that the patient may have received dialysis. I called [facility] dialysis [place] on [address] and spoke with nurse who said patient drove to her dialysis appointment today and arrived lethargic and unable to walk. Systolic blood pressure at that point was in the 70s, but dialysis was initiated. During dialysis, she received 500 mL NS and her blood pressure would not normalize, so they stopped the dialysis treatment half-way through (2 hours). When EMS arrived they gave the patient an additional 500 mL NS and her systolic blood pressure increased to the 90s.  6:58 PM - Consulted ICU and nephrology. Spoke with ICU over the phone and they will see the patient. I spoke with nephrologist Dr. Randhawa over the phone and he does not believe that urgent dialysis is needed since patient received some dialysis today. He suggests that ICU consult nephrology once CMP and other labs are resulted and the patient's critical care needs are met first.  7:03 PM - CMP reviewed. BUN and creatinine consistent with ESRD but sodium and potassium are WNL. Phosphorus and troponin are elevated, consistent with ESRD. TSH is elevated which may be contributing to her bradycardia.  7:14 PM - Systolic blood pressure is stable in the 90s. We will give 500 mL NS over 1 hour as we await critical care consult.  7:37 PM - Consulted cardiology due to increased troponin from baseline. I spoke to cardiology on the phone and he said he will see the patient. JENNIFER score 3.  8:56 PM - Spoke to cardiology. He suspects  that the troponin was from the hypovolemia and sepsis and recommended to continue current treatment. ABG has been performed.  9:09 PM - Repeat lactate decreased to 5.1 after abx and fluids. Patient will be admitted to ICU.            Scribe Attestation:   Scribe #1: I performed the above scribed service and the documentation accurately describes the services I performed. I attest to the accuracy of the note.    Attending Attestation:             Attending ED Notes:   74-year-old female who is a dialysis patient who apparently was noted to have hypotension and bradycardia during dialysis with a systolic of 70 and bradycardia down to 42 she was given a total of L of fluid during dialysis but the only dialyzed her for only 2 hr the patient is transported here.  Patient was noted to have a blood pressure in the 80 range and pulse continued 46 patient was awake and conversant she was here was given more fluids while we awaited the lab results she was noted to have a lactic acid of 7 and because of our concern for sepsis a code sepsis was called patient was given IV antibiotics after blood cultures she required multiple re-evaluations for in her stay in the emergency though she continued to do well and was alert and oriented. Patient because of elevated troponin was seen by also cardiology they commented to continue treating his sepsis she subsequently was admitted to the critical care ICU she was admitted to them because she continued to have an elevated lactic acid             Clinical Impression:       ICD-10-CM ICD-9-CM   1. Sepsis, due to unspecified organism A41.9 038.9     995.91   2. Bradycardia R00.1 427.89   3. Elevated troponin R74.8 790.6   4. Lactic acidosis E87.2 276.2   5. Acute on chronic right-sided heart failure I50.813 428.0   6. Pulmonary hypertension I27.20 416.8   7. Sinus bradycardia R00.1 427.89   8. Cardiac arrest I46.9 427.5         Disposition:   Disposition: Admitted  Condition:  Critical                        Andrei Castro DO  Resident  07/02/19 0039       Andrei Castro DO  Resident  07/03/19 [id number]       Miguel Carpenter MD  07/04/19 1273

## 2019-07-01 NOTE — PROGRESS NOTES
ED Code Sepsis Response - Pharmacist Recommendations     ED Code Sepsis activated at 18:36 on 07/01.    Patients allergies to antibiotics: N/A   Dr. Bety Gamez  was contacted at 1840 for broad spectrum antibiotics     UNKNOWN  Source  Patients allergies have been reviewed and I recommend broad spectrum Antibiotics:  Zosyn 4.5 g and Vancomycin load    Thank you,    Jamar Otero  Pharm D  [id number]

## 2019-07-02 NOTE — SUBJECTIVE & OBJECTIVE
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, without long-term current use of insulin     Diabetes mellitus type II     Diastolic heart failure     ESRD on dialysis since 6/10/16     Essential hypertension     Hyperlipidemia     Hypertension     Hypervolemia     LVH (left ventricular hypertrophy)     Moderate tricuspid regurgitation     PAOD (peripheral arterial occlusive disease)     Pleural effusion     Pulmonary hypertension     Pulmonary nodules     Secondary hyperparathyroidism of renal origin     Venous insufficiency of both lower extremities     Vitamin D deficiency        Past Surgical History:   Procedure Laterality Date    ADRENAL GLAND SURGERY  1990s    removed for tumor which she states was benign     AV graft      BIOPSY-KIDNEY Bilateral 1/29/2016    Performed by [facility] at [facility] OR 2ND FLR    CATARACT EXTRACTION W/  INTRAOCULAR LENS IMPLANT Right 08/07/2018    Dr. Kaye    HYSTERECTOMY  1990s    removed for tumor which she states was benign     INSERTION, INTRAOCULAR LENS PROSTHESIS Left 8/21/2018    Performed by Abdi Kaye MD at [facility] OR 1ST FLR    INSERTION, INTRAOCULAR LENS PROSTHESIS Right 8/7/2018    Performed by Abdi Kaye MD at [facility] OR 1ST FLR    JTAZZEGNN-IWWLX-XZQZFLCSHLKYE Left 11/9/2016    Performed by CHARLI Rey III, MD at [facility] OR 2ND FLR    IGCOUFYFU-TFZPD-KFUIXYWWDXHAD Femoral - Right vs Left Left 2/20/2017    Performed by CHARLI Rey III, MD at [facility] OR 2ND FLR    SHJGWFQL-LVYCFYO-IT Left 11/10/2016    Performed by CHARLI Rey III, MD at [facility] OR 2ND FLR  "   permcath placement      PHACOEMULSIFICATION, CATARACT Left 8/21/2018    Performed by [facility]di [facility] at [facility] OR 1ST FLR    PHACOEMULSIFICATION, CATARACT Right 8/7/2018    Performed by Abdi Kaye MD at [facility] OR 1ST FLR       Review of patient's allergies indicates:   Allergen Reactions    Ramipril Other (See Comments)     Other reaction(s): deathly ill      Simvastatin Other (See Comments)     Other reaction(s): Deathly ill      Hydralazine Other (See Comments)     Pt. states "tiredness."         Current Facility-Administered Medications on File Prior to Encounter   Medication    0.9%  NaCl infusion    cyclopentolate 1% ophthalmic solution 1 drop    phenylephrine HCL 2.5% ophthalmic solution 1 drop    proparacaine 0.5 % ophthalmic solution 1 drop    tropicamide 1% ophthalmic solution 1 drop     Current Outpatient Medications on File Prior to Encounter   Medication Sig    amLODIPine (NORVASC) 10 MG tablet TAKE ONE DAILY    aspirin 81 mg Tab Take 1 tablet by mouth nightly.     blood sugar diagnostic Strp Test 3 times daily. Please dispense 1 meter covered by insurance. Meter, Strips, and lancets    carvedilol (COREG) 25 MG tablet Take 1 tablet (25 mg total) by mouth 2 (two) times daily.    cholecalciferol, vitamin D3, (VITAMIN D3) 2,000 unit Cap Take 1 capsule by mouth every morning. Is unsure of the dosage    losartan (COZAAR) 100 MG tablet TAKE ONE BY MOUTH AT BEDTIME (Patient taking differently: TAKE ONE BY MOUTH every morning)    sevelamer carbonate (RENVELA) 800 mg Tab Take 2 tablets (1,600 mg total) by mouth 3 (three) times daily with meals.     Family History     Problem Relation (Age of Onset)    Cataracts Cousin    Diabetes Paternal Aunt    Pneumonia Brother        Tobacco Use    Smoking status: Never Smoker    Smokeless tobacco: Never Used   Substance and Sexual Activity    Alcohol use: No     Comment: quit since she started dialysis; previously was only having " wine occasionally and in her younger years would have whiskey/soda, etc    Drug use: No    Sexual activity: Not on file     Review of Systems   Constitutional: Positive for fatigue. Negative for activity change, appetite change, chills and fever.   HENT: Negative for congestion.    Respiratory: Negative for cough and shortness of breath.    Cardiovascular: Negative for chest pain and palpitations.   Gastrointestinal: Negative for diarrhea and nausea.   Genitourinary: Negative for dysuria.   Musculoskeletal: Negative for back pain.   Skin: Negative for wound.   Neurological: Negative for light-headedness and headaches.   Psychiatric/Behavioral: Negative for confusion.     Objective:     Vital Signs (Most Recent):  Temp: 97.8 °F (36.6 °C) (07/01/19 1801)  Pulse: (!) 52 (07/01/19 2017)  Resp: (!) 21 (07/01/19 2017)  BP: (!) 98/49 (07/01/19 2017)  SpO2: (!) 93 % (07/01/19 2017) Vital Signs (24h Range):  Temp:  [97.8 °F (36.6 °C)] 97.8 °F (36.6 °C)  Pulse:  [42-66] 52  Resp:  [21-33] 21  SpO2:  [93 %-100 %] 93 %  BP: ()/(40-54) 98/49     Weight: 63.5 kg (140 lb)  Body mass index is 27.34 kg/m².    Physical Exam   Constitutional: She is oriented to person, place, and time. She appears well-developed and well-nourished. No distress.   HENT:   Head: Normocephalic and atraumatic.   Eyes: Conjunctivae are normal. No scleral icterus.   Neck: Normal range of motion.   Cardiovascular: Regular rhythm and intact distal pulses.   Murmur (harsh midsystolic murmur heard best at left sternal border) heard.  Bradycardia present   Pulmonary/Chest: Effort normal and breath sounds normal.   On 2 L per NC   Abdominal: Soft. She exhibits no distension. There is no tenderness.   Musculoskeletal: Normal range of motion. She exhibits no edema.   Neurological: She is alert and oriented to person, place, and time.   Skin: Skin is warm and dry.   Psychiatric: She has a normal mood and affect. Her behavior is normal.   Nursing note and  vitals reviewed.      Significant Labs:   CBC:   Recent Labs   Lab 07/01/19  1735   WBC 14.73*   HGB 11.7*   HCT 36.6*   PLT 83*     CMP:   Recent Labs   Lab 07/01/19  1735      K 4.7      CO2 16*      BUN 27*   CREATININE 4.8*   CALCIUM 8.7   PROT 6.2   ALBUMIN 3.4*   BILITOT 1.2*   ALKPHOS 77   *   *   ANIONGAP 19*   EGFRNONAA 8.2*     Lactic Acid:   Recent Labs   Lab 07/01/19  1736 07/01/19 2010   LACTATE 7.0* 5.1*     Troponin:   Recent Labs   Lab 07/01/19  1735   TROPONINI 3.628*       Significant Imaging:     Imaging Results          X-Ray Chest AP Portable (Final result)  Result time 07/01/19 18:24:22    Final result by Maxine Gilbert MD (07/01/19 18:24:22)                 Impression:      As above      Electronically signed by: Maxine Gilbert MD  Date:    07/01/2019  Time:    18:24             Narrative:    EXAMINATION:  XR CHEST AP PORTABLE    CLINICAL HISTORY:  Chest Pain;    TECHNIQUE:  Single frontal view of the chest was performed.    COMPARISON:  January 30, 2018    FINDINGS:  Cardiac size is enlarged, slightly more prominent compared to prior study even with change in technique.  There is calcification along the wall of the aorta.  Small bilateral pleural effusions appear similar to previous study.  There is prominence of the central pulmonary vasculature and there are increased interstitial markings within the lungs bilaterally, findings likely related to edema.  Changes within the distal right clavicle suggest remote fracture.  There are surgical clips in the upper abdomen.

## 2019-07-02 NOTE — HPI
"Ms. Salcedo is a 73 yo woman with PMHx of ESRD (MWF HD, [facility]), HFpEF, DM2 (diet-controlled, A1c 6.0 in 2017), and essential HTN who presents to the ED from HD due to hypotension and bradycardia. Patient completed 2 hours of HD when it was stopped due to her low BP. She was given 1 L NS (SBP improved from 70s to 90s) and taken to the ED. Per patient and her daughter, her blood pressure has been persistently low in recent weeks-months prompting her physician to scale back on her BP meds. Patient was previously taking amlodipine 10, carvedilol 20 BID, and losartan 100. Patient states she was told to stop amlodipine 1 week ago, and was taking the coreg BID and losartan in the evenings (of note patient told the ED physician that she was only supposed to take coreg). Ms. Salcedo states that for the past few mornings she has experienced "head tightness" and thought this meant her BP was too high, though she did not actually check her BP. Due to this head tightness, patient states she took her usual coreg as well as taking her losartan early (told ED physician she also took amlodipine, though she now denies this). Denies "head tightness", headache, changes in vision at this time. While en route to the ED, patient was started on O2 per NC due to shortness of breath (O2 sats at that time not reported). On evaluation patient reports mild SOB, but denies CP, palpitations.     On presentation to the ED, EKG revealed sinus bradycardia with no ischemic changes. Labs remarkable for lactic acidosis (7), leukocytosis (14), elevated troponin (3.6) and BNP (>4900), as well as elevated INR (2.2, not on AC), and elevated transaminases (AST//139, normal alk phos). Critical Care Medicine consulted for lactic acidosis and shock.  "

## 2019-07-02 NOTE — DISCHARGE SUMMARY
"[facility]  Critical Care Medicine  Discharge Summary      Patient Name: Beth Salcedo  MRN: [id number]  Admission Date: 7/1/2019  Hospital Length of Stay: 1 days  Discharge Date and Time: 7/2/2019     Attending Physician: Miguel Carpenter, *   Discharging Provider: Danny Guevara MD  Primary Care Provider: Emili Sanchez MD  Reason for Admission: Bradycardia    HPI:   Ms. Salcedo is a 73 yo woman with PMHx of ESRD (MWF HD, [facility]), HFpEF, DM2 (diet-controlled, A1c 6.0 in 2017), and essential HTN who presents to the ED from HD due to hypotension and bradycardia. Patient completed 2 hours of HD when it was stopped due to her low BP. She was given 1 L NS (SBP improved from 70s to 90s) and taken to the ED. Per patient and her daughter, her blood pressure has been persistently low in recent weeks-months prompting her physician to scale back on her BP meds. Patient was previously taking amlodipine 10, carvedilol 20 BID, and losartan 100. Patient states she was told to stop amlodipine 1 week ago, and was taking the coreg BID and losartan in the evenings (of note patient told the ED physician that she was only supposed to take coreg). Ms. Salcedo states that for the past few mornings she has experienced "head tightness" and thought this meant her BP was too high, though she did not actually check her BP. Due to this head tightness, patient states she took her usual coreg as well as taking her losartan early (told ED physician she also took amlodipine, though she now denies this). Denies "head tightness", headache, changes in vision at this time. While en route to the ED, patient was started on O2 per NC due to shortness of breath (O2 sats at that time not reported). On evaluation patient reports mild SOB, but denies CP, palpitations, shortness of breath or change in exertional dyspnea from baseline.      On presentation to the ED, EKG revealed sinus bradycardia with no " "ischemic changes. Labs remarkable for lactic acidosis (7), leukocytosis (14), elevated troponin (3.6) and BNP (>4900), as well as elevated INR (2.2, not on AC), and elevated transaminases (AST//139, normal alk phos). Critical Care Medicine consulted for lactic acidosis and bradycardia.      * No surgery found *    Indwelling Lines/Drains at Time of Discharge:   Lines/Drains/Airways     Central Venous Catheter Line                 Hemodialysis Catheter right subclavian -- days          Drain                 Hemodialysis AV Graft 02/20/17 0835 Left thigh 861 days          Airway                 Airway - Non-Surgical 07/01/19 2344 LMA less than 1 day         Airway - Non-Surgical 07/02/19 0002 Endotracheal Tube less than 1 day          Arterial Line                 Arterial Line 07/02/19 0017 Left Femoral less than 1 day              Hospital Course:   Patient evaluated by Critical Care resident and staff at approximately 10:30 pm. Patient was alert, oriented and conversing normally. Denied any symptoms of pain or shortness of breath. O2 sat 95% on RA.     At approximately 11:15, Critical Care notified that patient was more bradycardic. SBP still in the 90s and patient mentating at baseline. Atropine was ordered and given, pacer pads placed. At 11:37 patient was speaking with her nurses when she developed a left gaze deviation and became unresponsive. Lost pulse and code was called. Rhythm PEA. ROSC achieved, but patient went into PEA 4 additional times. Code was called and time of death 12:31 am.     CTA chest that was ordered at admission was completed shortly before the code. No PE present; however, "Cardiomegaly with significant dilation of the right atrium and reflux of contrast into the IVC and hepatic veins, suggestive of cardiac dysfunction" noted. Though there is no evidence for MI or PE, have high suspicion that cardiac arrest was due to progressing right heart failure worsened by BB.     Consults " (From admission, onward)        Status Ordering Provider     Inpatient consult to Cardiology  Once     Provider:  (Not yet assigned)    Completed MIMI ABDULLAHI     Inpatient consult to Critical Care Medicine  Once     Provider:  (Not yet assigned)    Completed LUIZ GARCIA     Inpatient consult to Nephrology  Once     Provider:  (Not yet assigned)    Acknowledged MIMI ABDULLAHI        Significant Labs:  All pertinent labs within the past 24 hours have been reviewed.    Significant Imaging:  I have reviewed and interpreted all pertinent imaging results/findings within the past 24 hours.    Pending Diagnostic Studies:     Procedure Component Value Units Date/Time    CT Head Without Contrast [[id number]]     Order Status:  Sent Lab Status:  No result     Transthoracic echo (TTE) 2D with Color Flow [[id number]]     Order Status:  Sent Lab Status:  No result     US Liver with Doppler (xpd) [[id number]]     Order Status:  Sent Lab Status:  No result     X-Ray Chest AP Portable [[id number]]     Order Status:  Sent Lab Status:  No result         Final Active Diagnoses:    Diagnosis Date Noted POA    PRINCIPAL PROBLEM:  Bradycardia [R00.1] 07/01/2019 Unknown    Lactic acidosis [E87.2] 07/01/2019 Unknown    Elevated troponin [R74.8] 07/01/2019 Unknown    Transaminitis [R74.0] 07/01/2019 Unknown    (HFpEF) heart failure with preserved ejection fraction [I50.30] 08/29/2017 Yes    Essential hypertension [I10]  Yes     Chronic    ESRD (end stage renal disease) on dialysis [N18.6, Z99.2]  Not Applicable     Chronic    Anemia of chronic renal failure, stage 5 [N18.5, D63.1]  Yes     Chronic    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  Service: Critical Care Medicine    Discharged Condition: good    Disposition:       Patient Instructions:   No discharge procedures on file.  Medications:  None (patient  at medical facility)     Danny Guevara MD  Critical Care Medicine  [facility]

## 2019-07-02 NOTE — ASSESSMENT & PLAN NOTE
Patient not on anticoagulation, suspicion for nutritional deficiency given patient also has macrocytic anemia  Will give dose of vitamin K  Will check thiamin, B12 and folate

## 2019-07-02 NOTE — HPI
"Ms. Salcedo is a 73 yo woman with PMHx of ESRD (MWF HD, [facility]), HFpEF, DM2 (diet-controlled, A1c 6.0 in 2017), and essential HTN who presents to the ED from HD due to hypotension and bradycardia. Patient completed 2 hours of HD when it was stopped due to her low BP. She was given 1 L NS (SBP improved from 70s to 90s) and taken to the ED. Per patient and her daughter, her blood pressure has been persistently low in recent weeks-months prompting her physician to scale back on her BP meds. Patient was previously taking amlodipine 10, carvedilol 20 BID, and losartan 100. Patient states she was told to stop amlodipine 1 week ago, and was taking the coreg BID and losartan in the evenings (of note patient told the ED physician that she was only supposed to take coreg). Ms. Salcedo states that for the past few mornings she has experienced "head tightness" and thought this meant her BP was too high, though she did not actually check her BP. Due to this head tightness, patient states she took her usual coreg as well as taking her losartan early (told ED physician she also took amlodipine, though she now denies this). Denies "head tightness", headache, changes in vision at this time. While en route to the ED, patient was started on O2 per NC due to shortness of breath (O2 sats at that time not reported). On evaluation patient reports mild SOB, but denies CP, palpitations, shortness of breath or change in exertional dyspnea from baseline.      On presentation to the ED, EKG revealed sinus bradycardia with no ischemic changes. Labs remarkable for lactic acidosis (7), leukocytosis (14), elevated troponin (3.6) and BNP (>4900), as well as elevated INR (2.2, not on AC), and elevated transaminases (AST//139, normal alk phos). Critical Care Medicine consulted for lactic acidosis and bradycardia.    "

## 2019-07-02 NOTE — SUBJECTIVE & OBJECTIVE
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, without long-term current use of insulin     Diabetes mellitus type II     Diastolic heart failure     ESRD on dialysis since 6/10/16     Essential hypertension     Hyperlipidemia     Hypertension     Hypervolemia     LVH (left ventricular hypertrophy)     Moderate tricuspid regurgitation     PAOD (peripheral arterial occlusive disease)     Pleural effusion     Pulmonary hypertension     Pulmonary nodules     Secondary hyperparathyroidism of renal origin     Venous insufficiency of both lower extremities     Vitamin D deficiency        Past Surgical History:   Procedure Laterality Date    ADRENAL GLAND SURGERY  1990s    removed for tumor which she states was benign     AV graft      BIOPSY-KIDNEY Bilateral 1/29/2016    Performed by [facility] at [facility] OR 2ND FLR    CATARACT EXTRACTION W/  INTRAOCULAR LENS IMPLANT Right 08/07/2018    Dr. Kaye    HYSTERECTOMY  1990s    removed for tumor which she states was benign     INSERTION, INTRAOCULAR LENS PROSTHESIS Left 8/21/2018    Performed by Abdi Kaye MD at [facility] OR 1ST FLR    INSERTION, INTRAOCULAR LENS PROSTHESIS Right 8/7/2018    Performed by [facility]di [facility] at [facility] OR 1ST FLR    VJKLGRTJU-VOCPK-[id number] Left 11/9/2016    Performed by CHARLI Rey III, MD at [facility] OR 2ND FLR    TXNRPLBRR-ILMCF-[id number] Femoral - Right vs Left Left 2/20/2017    Performed by CHARLI Rey III, MD at [facility] OR 2ND FLR    XBGSOELE-[id number]-PF Left 11/10/2016    Performed by CHARLI Rey III, MD at [facility] OR 2ND FLR  "   permcath placement      PHACOEMULSIFICATION, CATARACT Left 8/21/2018    Performed by [facility] at [facility] OR 1ST FLR    PHACOEMULSIFICATION, CATARACT Right 8/7/2018    Performed by Abdi Kaye MD at [facility] OR 1ST FLR       Review of patient's allergies indicates:   Allergen Reactions    Ramipril Other (See Comments)     Other reaction(s): deathly ill      Simvastatin Other (See Comments)     Other reaction(s): Deathly ill      Hydralazine Other (See Comments)     Pt. states "tiredness."         Family History     Problem Relation (Age of Onset)    Cataracts Cousin    Diabetes Paternal Aunt    Pneumonia Brother        Tobacco Use    Smoking status: Never Smoker    Smokeless tobacco: Never Used   Substance and Sexual Activity    Alcohol use: No     Comment: quit since she started dialysis; previously was only having wine occasionally and in her younger years would have whiskey/soda, etc    Drug use: No    Sexual activity: Not on file      Review of Systems   Constitutional: Negative for activity change, appetite change, chills, fatigue and fever.   HENT: Negative for congestion.    Respiratory: Negative for cough and shortness of breath.    Cardiovascular: Negative for chest pain and palpitations.   Gastrointestinal: Negative for diarrhea and nausea.   Genitourinary: Negative for dysuria.   Musculoskeletal: Negative for back pain.   Skin: Negative for wound.   Neurological: Negative for light-headedness and headaches.   Psychiatric/Behavioral: Negative for confusion.     Objective:     Vital Signs (Most Recent):  Temp: 97.8 °F (36.6 °C) (07/01/19 1801)  Pulse: (!) 135 (07/02/19 0028)  Resp: (!) 30 (07/02/19 0028)  BP: 120/63 (07/02/19 0028)  SpO2: (!) 12 % (07/02/19 0028) Vital Signs (24h Range):  Temp:  [97.8 °F (36.6 °C)] 97.8 °F (36.6 °C)  Pulse:  [] 135  Resp:  [21-33] 30  SpO2:  [12 %-100 %] 12 %  BP: ()/(40-63) 120/63   Weight: 63.5 kg (140 lb)  Body mass index is " 27.34 kg/m².      Intake/Output Summary (Last 24 hours) at 7/2/2019 0143  Last data filed at 7/1/2019 2324  Gross per 24 hour   Intake 2416.67 ml   Output --   Net 2416.67 ml       Physical Exam   Constitutional: She is oriented to person, place, and time. She appears well-developed and well-nourished. No distress.   HENT:   Head: Normocephalic and atraumatic.   Eyes: Conjunctivae are normal. No scleral icterus.   Neck: Normal range of motion.   Cardiovascular: Regular rhythm and intact distal pulses.   Murmur (harsh midsystolic murmur heard best at left sternal border) heard.  Bradycardia present   Pulmonary/Chest: Effort normal and breath sounds normal.   On 2 L per NC   Abdominal: Soft. She exhibits no distension. There is no tenderness.   Musculoskeletal: Normal range of motion. She exhibits no edema.   Neurological: She is alert and oriented to person, place, and time.   Skin: Skin is warm and dry.   Psychiatric: She has a normal mood and affect. Her behavior is normal.   Nursing note and vitals reviewed.      Vents:  Vent Mode: A/C (07/02/19 0028)  Ventilator Initiated: Yes (07/02/19 0028)  Set Rate: 18 bmp (07/02/19 0028)  Vt Set: 400 mL (07/02/19 0028)  PEEP/CPAP: 5 cmH20 (07/02/19 0028)  Oxygen Concentration (%): 100 (07/02/19 0028)  Peak Airway Pressure: 50 cmH2O (07/02/19 0028)  Total Ve: 450 mL (07/02/19 0028)  Lines/Drains/Airways     Central Venous Catheter Line                 Hemodialysis Catheter right subclavian -- days          Drain                 Hemodialysis AV Graft 02/20/17 0835 Left thigh 861 days          Airway                 Airway - Non-Surgical 07/01/19 2344 LMA less than 1 day         Airway - Non-Surgical 07/02/19 0002 Endotracheal Tube less than 1 day          Arterial Line                 Arterial Line 07/02/19 0017 Left Femoral less than 1 day          Peripheral Intravenous Line                 Peripheral IV - Single Lumen 08/21/18 0913 Right Hand 314 days         External  Jugular IV 07/01/19 2345 less than 1 day         Peripheral IV - Single Lumen 07/01/19 1725 22 G Right Forearm less than 1 day         Peripheral IV - Single Lumen 07/01/19 1850 20 G Right Forearm less than 1 day              Significant Labs:    CBC/Anemia Profile:  Recent Labs   Lab 07/01/19 1735 07/01/19  2352 07/02/19  0028 07/02/19  0028   WBC 14.73* 12.30 22.19*  --    HGB 11.7* 11.8* 11.6*  --    HCT 36.6* 39.5 36.5* 37   PLT 83* 41* 55*  --    * 111* 104*  --    RDW 15.1* 15.0* 15.1*  --         Chemistries:  Recent Labs   Lab 07/01/19 1735 07/01/19  2352    135*   K 4.7 5.6*    101   CO2 16* 15*   BUN 27* 35*   CREATININE 4.8* 5.2*   CALCIUM 8.7 12.7*   ALBUMIN 3.4* 2.9*   PROT 6.2 6.0   BILITOT 1.2* 1.1*   ALKPHOS 77 86   * 204*   * 252*   MG 2.0 2.3   PHOS 5.4* 7.1*       Blood Culture: No results for input(s): LABBLOO in the last 48 hours.  CMP:   Recent Labs   Lab 07/01/19 1735 07/01/19 2352    135*   K 4.7 5.6*    101   CO2 16* 15*    182*   BUN 27* 35*   CREATININE 4.8* 5.2*   CALCIUM 8.7 12.7*   PROT 6.2 6.0   ALBUMIN 3.4* 2.9*   BILITOT 1.2* 1.1*   ALKPHOS 77 86   * 252*   * 204*   ANIONGAP 19* 19*   EGFRNONAA 8.2* 7.5*     Lactic Acid:   Recent Labs   Lab 07/01/19 1736 07/01/19 2010 07/01/19  2352   LACTATE 7.0* 5.1* 11.8*     Troponin:   Recent Labs   Lab 07/01/19 1735 07/01/19 2325 07/01/19  2352   TROPONINI 3.628* 2.605* 3.277*       Significant Imaging:   Imaging Results          CTA Chest Non Coronary (Final result)  Result time 07/02/19 00:31:36    Final result by Richie Mata MD (07/02/19 00:31:36)                 Impression:      No evidence of pulmonary thromboembolism.    Moderate right-sided pleural effusion, increased in size since CT chest 02/14/2019.  Compressive atelectasis of the right lower lobe.  Trace left pleural fluid, unchanged.    Cardiomegaly with significant dilation of the right atrium and  reflux of contrast into the IVC and hepatic veins, suggestive of cardiac dysfunction.    Other incidental findings as above.    Electronically signed by resident: Hugo Irene MD  Date:    07/01/2019  Time:    23:05    Electronically signed by: Richie Mata MD  Date:    07/02/2019  Time:    00:31             Narrative:    EXAMINATION:  CTA CHEST NON CORONARY    CLINICAL HISTORY:  Dyspnea, cardiac origin suspected;Chest pain, acute, PE suspected, low pretest prob;    TECHNIQUE:  Low dose axial images, sagittal and coronal reformations were obtained from the thoracic inlet to the lung bases following the IV administration of 75 mL of Omnipaque 350.  Contrast timing was optimized to evaluate the pulmonary arteries.    COMPARISON:  CT chest without contrast 02/14/2019, 08/26/2017.    FINDINGS:  Pulmonary vasculature: Satisfactory opacification of the pulmonary arterial system with no filling defect to the segmental level.    Aorta: Left-sided aortic arch.  Severe calcified atherosclerotic changes the aorta with no significant aneurysmal dilatation.    Base of Neck: No significant abnormality.    Thoracic soft tissues: Normal.    Heart: Enlarged, particularly the right atrium and ventricle.  Trace pericardial effusion.  Coronary artery calcifications.    Beatrice/Mediastinum: No pathologic jimbo enlargement.    Airways: Patent.    Lungs/Pleura: Breathing motion artifact.    Moderate right-sided pleural effusion which has increased in size since the prior examination dated 02/14/2019.  Trace left pleural fluid, unchanged.  Compressive atelectasis of the basal segments of the right lower lobe.  The left lung is clear.    Esophagus: Normal.    Upper Abdomen: Trace perihepatic free fluid.  Surgical clips at the expected area of the left adrenal gland.  Reflux of contrast into the IVC and hepatic veins.    Bones: No acute fracture. No suspicious lytic or sclerotic lesions.                               X-Ray Chest AP Portable  (Final result)  Result time 07/01/19 18:24:22    Final result by Maxine Gilbert MD (07/01/19 18:24:22)                 Impression:      As above      Electronically signed by: Maxine Gilbert MD  Date:    07/01/2019  Time:    18:24             Narrative:    EXAMINATION:  XR CHEST AP PORTABLE    CLINICAL HISTORY:  Chest Pain;    TECHNIQUE:  Single frontal view of the chest was performed.    COMPARISON:  January 30, 2018    FINDINGS:  Cardiac size is enlarged, slightly more prominent compared to prior study even with change in technique.  There is calcification along the wall of the aorta.  Small bilateral pleural effusions appear similar to previous study.  There is prominence of the central pulmonary vasculature and there are increased interstitial markings within the lungs bilaterally, findings likely related to edema.  Changes within the distal right clavicle suggest remote fracture.  There are surgical clips in the upper abdomen.

## 2019-07-02 NOTE — H&P
"[facility]  Critical Care Medicine  History & Physical    Patient Name: Beth Salcedo  MRN: [id number]  Admission Date: 7/1/2019  Hospital Length of Stay: 1 days  Code Status: Full Code  Attending Physician: Miguel Carpenter, *   Primary Care Provider: Emili Sanchez MD   Principal Problem: <principal problem not specified>    Subjective:     HPI:  Ms. Salcedo is a 73 yo woman with PMHx of ESRD (MWF HD, [facility]), HFpEF, DM2 (diet-controlled, A1c 6.0 in 2017), and essential HTN who presents to the ED from HD due to hypotension and bradycardia. Patient completed 2 hours of HD when it was stopped due to her low BP. She was given 1 L NS (SBP improved from 70s to 90s) and taken to the ED. Per patient and her daughter, her blood pressure has been persistently low in recent weeks-months prompting her physician to scale back on her BP meds. Patient was previously taking amlodipine 10, carvedilol 20 BID, and losartan 100. Patient states she was told to stop amlodipine 1 week ago, and was taking the coreg BID and losartan in the evenings (of note patient told the ED physician that she was only supposed to take coreg). Ms. Salcedo states that for the past few mornings she has experienced "head tightness" and thought this meant her BP was too high, though she did not actually check her BP. Due to this head tightness, patient states she took her usual coreg as well as taking her losartan early (told ED physician she also took amlodipine, though she now denies this). Denies "head tightness", headache, changes in vision at this time. While en route to the ED, patient was started on O2 per NC due to shortness of breath (O2 sats at that time not reported). On evaluation patient reports mild SOB, but denies CP, palpitations, shortness of breath or change in exertional dyspnea from baseline.      On presentation to the ED, EKG revealed sinus bradycardia with no ischemic changes. Labs " remarkable for lactic acidosis (7), leukocytosis (14), elevated troponin (3.6) and BNP (>4900), as well as elevated INR (2.2, not on AC), and elevated transaminases (AST//139, normal alk phos). Critical Care Medicine consulted for lactic acidosis and shock.      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, without long-term current use of insulin     Diabetes mellitus type II     Diastolic heart failure     ESRD on dialysis since 6/10/16     Essential hypertension     Hyperlipidemia     Hypertension     Hypervolemia     LVH (left ventricular hypertrophy)     Moderate tricuspid regurgitation     PAOD (peripheral arterial occlusive disease)     Pleural effusion     Pulmonary hypertension     Pulmonary nodules     Secondary hyperparathyroidism of renal origin     Venous insufficiency of both lower extremities     Vitamin D deficiency        Past Surgical History:   Procedure Laterality Date    ADRENAL GLAND SURGERY  1990s    removed for tumor which she states was benign     AV graft      BIOPSY-KIDNEY Bilateral 1/29/2016    Performed by [facility] at [facility] OR 2ND FLR    CATARACT EXTRACTION W/  INTRAOCULAR LENS IMPLANT Right 08/07/2018    Dr. Kaye    HYSTERECTOMY  1990s    removed for tumor which she states was benign     INSERTION, INTRAOCULAR LENS PROSTHESIS Left 8/21/2018    Performed by Abdi Kaye MD at [facility] OR 1ST FLR    INSERTION, INTRAOCULAR LENS PROSTHESIS Right 8/7/2018    Performed by Abdi [facility]e MD at [facility] OR [facility] FLR     "JIGHEMGTF-FAZOQ-[id number] Left 11/9/2016    Performed by CHARLI Rey III, [facility] at [facility] OR 2ND FLR    [id number]-OEPUS-[id number] Femoral - Right vs Left Left 2/20/2017    Performed by CHARLI Rey III, MD at [facility] OR 2ND FLR    IFAZDWDX-SMZSSMJ-SJ Left 11/10/2016    Performed by CHARLI Rey III, MD at [facility] OR 2ND FLR    permcath placement      PHACOEMULSIFICATION, CATARACT Left 8/21/2018    Performed by Abdi Kaye MD at [facility] OR 1ST FLR    PHACOEMULSIFICATION, CATARACT Right 8/7/2018    Performed by Abdi Kaye MD at [facility] OR 1ST FLR       Review of patient's allergies indicates:   Allergen Reactions    Ramipril Other (See Comments)     Other reaction(s): deathly ill      Simvastatin Other (See Comments)     Other reaction(s): Deathly ill      Hydralazine Other (See Comments)     Pt. states "tiredness."         Family History     Problem Relation (Age of Onset)    Cataracts Cousin    Diabetes Paternal Aunt    Pneumonia Brother        Tobacco Use    Smoking status: Never Smoker    Smokeless tobacco: Never Used   Substance and Sexual Activity    Alcohol use: No     Comment: quit since she started dialysis; previously was only having wine occasionally and in her younger years would have whiskey/soda, etc    Drug use: No    Sexual activity: Not on file      Review of Systems   Constitutional: Negative for activity change, appetite change, chills, fatigue and fever.   HENT: Negative for congestion.    Respiratory: Negative for cough and shortness of breath.    Cardiovascular: Negative for chest pain and palpitations.   Gastrointestinal: Negative for diarrhea and nausea.   Genitourinary: Negative for dysuria.   Musculoskeletal: Negative for back pain.   Skin: Negative for wound.   Neurological: Negative for light-headedness and headaches.   Psychiatric/Behavioral: Negative for confusion.     Objective:     Vital Signs (Most Recent):  Temp: 97.8 °F (36.6 °C) (07/01/19 " 1801)  Pulse: (!) 135 (07/02/19 0028)  Resp: (!) 30 (07/02/19 0028)  BP: 120/63 (07/02/19 0028)  SpO2: (!) 12 % (07/02/19 0028) Vital Signs (24h Range):  Temp:  [97.8 °F (36.6 °C)] 97.8 °F (36.6 °C)  Pulse:  [] 135  Resp:  [21-33] 30  SpO2:  [12 %-100 %] 12 %  BP: ()/(40-63) 120/63   Weight: 63.5 kg (140 lb)  Body mass index is 27.34 kg/m².      Intake/Output Summary (Last 24 hours) at 7/2/2019 0143  Last data filed at 7/1/2019 2324  Gross per 24 hour   Intake 2416.67 ml   Output --   Net 2416.67 ml       Physical Exam   Constitutional: She is oriented to person, place, and time. She appears well-developed and well-nourished. No distress.   HENT:   Head: Normocephalic and atraumatic.   Eyes: Conjunctivae are normal. No scleral icterus.   Neck: Normal range of motion.   Cardiovascular: Regular rhythm and intact distal pulses.   Murmur (harsh midsystolic murmur heard best at left sternal border) heard.  Bradycardia present   Pulmonary/Chest: Effort normal and breath sounds normal.   On 2 L per NC   Abdominal: Soft. She exhibits no distension. There is no tenderness.   Musculoskeletal: Normal range of motion. She exhibits no edema.   Neurological: She is alert and oriented to person, place, and time.   Skin: Skin is warm and dry.   Psychiatric: She has a normal mood and affect. Her behavior is normal.   Nursing note and vitals reviewed.      Vents:  Vent Mode: A/C (07/02/19 0028)  Ventilator Initiated: Yes (07/02/19 0028)  Set Rate: 18 bmp (07/02/19 0028)  Vt Set: 400 mL (07/02/19 0028)  PEEP/CPAP: 5 cmH20 (07/02/19 0028)  Oxygen Concentration (%): 100 (07/02/19 0028)  Peak Airway Pressure: 50 cmH2O (07/02/19 0028)  Total Ve: 450 mL (07/02/19 0028)  Lines/Drains/Airways     Central Venous Catheter Line                 Hemodialysis Catheter right subclavian -- days          Drain                 Hemodialysis AV Graft 02/20/17 0835 Left thigh 861 days          Airway                 Airway - Non-Surgical  07/01/19 2344 LMA less than 1 day         Airway - Non-Surgical 07/02/19 0002 Endotracheal Tube less than 1 day          Arterial Line                 Arterial Line 07/02/19 0017 Left Femoral less than 1 day          Peripheral Intravenous Line                 Peripheral IV - Single Lumen 08/21/18 0913 Right Hand 314 days         External Jugular IV 07/01/19 2345 less than 1 day         Peripheral IV - Single Lumen 07/01/19 1725 22 G Right Forearm less than 1 day         Peripheral IV - Single Lumen 07/01/19 1850 20 G Right Forearm less than 1 day              Significant Labs:    CBC/Anemia Profile:  Recent Labs   Lab 07/01/19  1735 07/01/19  2352 07/02/19  0028 07/02/19  0028   WBC 14.73* 12.30 22.19*  --    HGB 11.7* 11.8* 11.6*  --    HCT 36.6* 39.5 36.5* 37   PLT 83* 41* 55*  --    * 111* 104*  --    RDW 15.1* 15.0* 15.1*  --         Chemistries:  Recent Labs   Lab 07/01/19  1735 07/01/19  2352    135*   K 4.7 5.6*    101   CO2 16* 15*   BUN 27* 35*   CREATININE 4.8* 5.2*   CALCIUM 8.7 12.7*   ALBUMIN 3.4* 2.9*   PROT 6.2 6.0   BILITOT 1.2* 1.1*   ALKPHOS 77 86   * 204*   * 252*   MG 2.0 2.3   PHOS 5.4* 7.1*       Blood Culture: No results for input(s): LABBLOO in the last 48 hours.  CMP:   Recent Labs   Lab 07/01/19  1735 07/01/19  2352    135*   K 4.7 5.6*    101   CO2 16* 15*    182*   BUN 27* 35*   CREATININE 4.8* 5.2*   CALCIUM 8.7 12.7*   PROT 6.2 6.0   ALBUMIN 3.4* 2.9*   BILITOT 1.2* 1.1*   ALKPHOS 77 86   * 252*   * 204*   ANIONGAP 19* 19*   EGFRNONAA 8.2* 7.5*     Lactic Acid:   Recent Labs   Lab 07/01/19  1736 07/01/19 2010 07/01/19  2352   LACTATE 7.0* 5.1* 11.8*     Troponin:   Recent Labs   Lab 07/01/19 1735 07/01/19 2325 07/01/19  2352   TROPONINI 3.628* 2.605* 3.277*       Significant Imaging:   Imaging Results          CTA Chest Non Coronary (Final result)  Result time 07/02/19 00:31:36    Final result by Richie Mata MD  (07/02/19 00:31:36)                 Impression:      No evidence of pulmonary thromboembolism.    Moderate right-sided pleural effusion, increased in size since CT chest 02/14/2019.  Compressive atelectasis of the right lower lobe.  Trace left pleural fluid, unchanged.    Cardiomegaly with significant dilation of the right atrium and reflux of contrast into the IVC and hepatic veins, suggestive of cardiac dysfunction.    Other incidental findings as above.    Electronically signed by resident: uHgo Irene MD  Date:    07/01/2019  Time:    23:05    Electronically signed by: Richie Mata MD  Date:    07/02/2019  Time:    00:31             Narrative:    EXAMINATION:  CTA CHEST NON CORONARY    CLINICAL HISTORY:  Dyspnea, cardiac origin suspected;Chest pain, acute, PE suspected, low pretest prob;    TECHNIQUE:  Low dose axial images, sagittal and coronal reformations were obtained from the thoracic inlet to the lung bases following the IV administration of 75 mL of Omnipaque 350.  Contrast timing was optimized to evaluate the pulmonary arteries.    COMPARISON:  CT chest without contrast 02/14/2019, 08/26/2017.    FINDINGS:  Pulmonary vasculature: Satisfactory opacification of the pulmonary arterial system with no filling defect to the segmental level.    Aorta: Left-sided aortic arch.  Severe calcified atherosclerotic changes the aorta with no significant aneurysmal dilatation.    Base of Neck: No significant abnormality.    Thoracic soft tissues: Normal.    Heart: Enlarged, particularly the right atrium and ventricle.  Trace pericardial effusion.  Coronary artery calcifications.    Beatrice/Mediastinum: No pathologic jimbo enlargement.    Airways: Patent.    Lungs/Pleura: Breathing motion artifact.    Moderate right-sided pleural effusion which has increased in size since the prior examination dated 02/14/2019.  Trace left pleural fluid, unchanged.  Compressive atelectasis of the basal segments of the right lower lobe.   The left lung is clear.    Esophagus: Normal.    Upper Abdomen: Trace perihepatic free fluid.  Surgical clips at the expected area of the left adrenal gland.  Reflux of contrast into the IVC and hepatic veins.    Bones: No acute fracture. No suspicious lytic or sclerotic lesions.                               X-Ray Chest AP Portable (Final result)  Result time 07/01/19 18:24:22    Final result by Maxine Gilbert MD (07/01/19 18:24:22)                 Impression:      As above      Electronically signed by: Maxine Gilbert MD  Date:    07/01/2019  Time:    18:24             Narrative:    EXAMINATION:  XR CHEST AP PORTABLE    CLINICAL HISTORY:  Chest Pain;    TECHNIQUE:  Single frontal view of the chest was performed.    COMPARISON:  January 30, 2018    FINDINGS:  Cardiac size is enlarged, slightly more prominent compared to prior study even with change in technique.  There is calcification along the wall of the aorta.  Small bilateral pleural effusions appear similar to previous study.  There is prominence of the central pulmonary vasculature and there are increased interstitial markings within the lungs bilaterally, findings likely related to edema.  Changes within the distal right clavicle suggest remote fracture.  There are surgical clips in the upper abdomen.                                  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           - Evaluated by Cardiology in  ED, did not feel that patient was having ACS    - EKG at admission sinus yolanda with no ischemic changes          - STAT echo ordered, will trend troponin          - Will hold antihypertensives  - ?Septic shock (leukocytosis, but no localizing symptoms)          - Started on Vanc and zosyn in ED, will continue for 48 hours with low threshold to d/c should cultures remain negative    (HFpEF) heart failure with preserved ejection fraction  Most recent ECHO 2017 with EF 60-65%, mildly decreased RV systolic function, PA pressure 80  On admit BNP >4900, CXR with edema  Will hold diuresis pending improvement in BP    Essential hypertension  Home regimen coreg 25 and losartan 100  Holding in the setting of hypotension    Renal/  Lactic acidosis  Lactic 7 on admission, trended down to 5 with administration of fluids  Will recheck on am draw as patient's BP is improving  Further treatment per Bradycardia    ESRD (end stage renal disease) on dialysis  Patient completed 2 hours of HD day of admission  No urgent indications for SLED/CRRT   Nephro following      Oncology  Anemia of chronic renal failure, stage 5  Mild macrocytic anemia on admit labs  Will check B12 and folate    GI  Transaminitis  Likely congestive hepatopathy  Will trend CMP daily        Critical Care Daily Checklist:    A: Awake: RASS Goal/Actual Goal:    Actual:     B: Spontaneous Breathing Trial Performed?     C: SAT & SBT Coordinated?  N/a                      D: Delirium: CAM-ICU     E: Early Mobility Performed? Yes   F: Feeding Goal:    Status:     Current Diet Order   Procedures    Diet NPO      AS: Analgesia/Sedation N/a   T: Thromboembolic Prophylaxis Heparin subcutaneous   H: HOB > 300 Yes   U: Stress Ulcer Prophylaxis (if needed) N/a   G: Glucose Control LDSSI   B: Bowel Function     I: Indwelling Catheter (Lines & Foy) Necessity PIV   D: De-escalation of Antimicrobials/Pharmacotherapies Continue broad spectrum    Plan for the day/ETD Admit to  MICU    Code Status:  Family/Goals of Care: Full Code         Critical secondary to Patient has a condition that poses threat to life and bodily function: Bradycardia with elevated troponin and lactic acidosis.      Critical care was time spent personally by me on the following activities: development of treatment plan with patient or surrogate and bedside caregivers, discussions with consultants, evaluation of patient's response to treatment, examination of patient, ordering and performing treatments and interventions, ordering and review of laboratory studies, ordering and review of radiographic studies, pulse oximetry, re-evaluation of patient's condition. This critical care time did not overlap with that of any other provider or involve time for any procedures.     Danny Guevara MD  Critical Care Medicine  [facility]

## 2019-07-02 NOTE — PROVIDER PROGRESS NOTES - EMERGENCY DEPT.
Encounter Date: 7/1/2019    ED Physician Progress Notes           Intubation  Date/Time: 7/2/2019 12:10 AM  Location procedure was performed: [facility] EMERGENCY DEPARTMENT  Performed by: Galindo Weaver MD  Authorized by: Miguel Carpenter MD   Pre-operative diagnosis: cardiac arrest  Post-operative diagnosis: cardiac arrest  Consent Done: Emergent Situation  Intubation method: video-assisted  Patient status: unconscious  Preoxygenation: GEOVANNA/LMA  Pretreatment medications: none  Sedation: none.  Paralytic: none  Laryngoscope size: Glidescope.  Tube size: 7.0 mm  Tube type: cuffed  Number of attempts: 1  Cricoid pressure: no  Cords visualized: yes  Post-procedure assessment: chest rise,  ETCO2 monitor and CO2 detector  Breath sounds: rales/crackles,  reduced on left and absent over the epigastrium  Cuff inflated: yes  ETT to lip: 24 cm  ETT to teeth: 23 cm  Tube secured with: adhesive tape and ETT baltazar      Performed intubation during ACLS resuscitation of patient for cardiac arrest, in presence of ED attending Karen and MICU attending [place]. Patient had previously been admitted to MICU but remained in ED due to bed availability. MICU team arrived to room shortly after patient's cardiac arrest, and was present for her resuscitation.  MARC Weaver MD  12:14 AM

## 2019-07-02 NOTE — ASSESSMENT & PLAN NOTE
Likely 2/2 excessive dose of BB and/or CCB; however, will rule out MI and PE.           - Evaluated by Cardiology in ED, did not feel that patient was having ACS    - EKG at admission sinus yolanda with no ischemic changes          - STAT echo ordered, will trend troponin          - Will hold antihypertensives  - ?Septic shock (leukocytosis, but no localizing symptoms)          - Started on Vanc and zosyn in ED, will continue for 48 hours with low threshold to d/c should cultures remain negative

## 2019-07-02 NOTE — ASSESSMENT & PLAN NOTE
Likely demand ischemia given presentation of sepsis.  Not c/w ACS given no angina or ECG changes.   - may trend until decline  - treat sepsis   - may consider o/p stress when stable

## 2019-07-02 NOTE — ASSESSMENT & PLAN NOTE
Patient completed 2 hours of HD day of admission  No urgent indications for SLED/CRRT   Nephro following

## 2019-07-02 NOTE — ASSESSMENT & PLAN NOTE
Patient evaluated in the ER  At this moment patient doesn't need emergent RRT  Should patient deteriorate would consider SLED while in the ICU  Will follow closely

## 2019-07-02 NOTE — HPI
73 yo woman with PMHx of ESRD (MWF HD, [facility]), HFpEF, DM2 (diet-controlled, A1c 6.0 in 2017), and essential HTN who presents to the ED from HD due to hypotension and bradycardia. Patient completed 2 hours of HD when it was stopped due to her low BP. She was given 1 L NS (SBP improved from 70s to 90s) and taken to the ED.     Nephrology consulted for management of ESRD while inpatient

## 2019-07-02 NOTE — HOSPITAL COURSE
"Patient evaluated by Critical Care resident and staff at approximately 10:30 pm. Patient was alert, oriented and conversing normally. Denied any symptoms of pain or shortness of breath. O2 sat 95% on RA.     At approximately 11:15, Critical Care notified that patient was more bradycardic. SBP still in the 90s and patient mentating at baseline. Atropine was ordered and given, pacer pads placed. At 11:37 patient was speaking with her nurses when she developed a left gaze deviation and became unresponsive. Lost pulse and code was called. Rhythm PEA. ROSC achieved, but patient went into PEA 4 additional times. Code was called and time of death 12:31 am.     CTA chest that was ordered at admission was completed shortly before the code. No PE present; however, "Cardiomegaly with significant dilation of the right atrium and reflux of contrast into the IVC and hepatic veins, suggestive of cardiac dysfunction" noted. Though there is no evidence for MI or PE, have high suspicion that cardiac arrest was due to progressing right heart failure worsened by BB.   "

## 2019-07-02 NOTE — ASSESSMENT & PLAN NOTE
Most recent ECHO 2017 with EF 60-65%, mildly decreased RV systolic function, PA pressure 80  On admit BNP >4900, CXR with edema  Will hold diuresis pending improvement in BP

## 2019-07-02 NOTE — CONSULTS
[facility]  Nephrology  Consult Note    Patient Name: Beth Salcedo  MRN: [id number]  Admission Date: 7/1/2019  Hospital Length of Stay: 1 days  Attending Provider: Miguel Carpenter, *   Primary Care Physician: Emili Sanchez MD  Principal Problem:Bradycardia    Inpatient consult to Nephrology  Consult performed by: Harman Martínez MD  Consult ordered by: Danny Guevara MD        Subjective:     HPI: 73 yo woman with PMHx of ESRD (MWF HD, [facility]), HFpEF, DM2 (diet-controlled, A1c 6.0 in 2017), and essential HTN who presents to the ED from HD due to hypotension and bradycardia. Patient completed 2 hours of HD when it was stopped due to her low BP. She was given 1 L NS (SBP improved from 70s to 90s) and taken to the ED.     Nephrology consulted for management of ESRD while inpatient       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, without long-term current use of insulin     Diabetes mellitus type II     Diastolic heart failure     ESRD on dialysis since 6/10/16     Essential hypertension     Hyperlipidemia     Hypertension     Hypervolemia     LVH (left ventricular hypertrophy)     Moderate tricuspid regurgitation     PAOD (peripheral arterial occlusive disease)     Pleural effusion     Pulmonary hypertension     Pulmonary nodules     Secondary hyperparathyroidism of renal origin     Venous insufficiency of both lower extremities     Vitamin D deficiency        Past Surgical History:   Procedure Laterality Date    ADRENAL GLAND SURGERY  1990s    removed for tumor which she states was benign     AV  "graft      BIOPSY-KIDNEY Bilateral 1/29/2016    Performed by [facility] at [facility] OR 2ND FLR    CATARACT EXTRACTION W/  INTRAOCULAR LENS IMPLANT Right 08/07/2018    Dr. Kaye    HYSTERECTOMY  1990s    removed for tumor which she states was benign     INSERTION, INTRAOCULAR LENS PROSTHESIS Left 8/21/2018    Performed by Abdi Kaye MD at [facility] OR 1ST FLR    INSERTION, INTRAOCULAR LENS PROSTHESIS Right 8/7/2018    Performed by Abdi [facility]e MD at [facility] OR 1ST FLR    QICGGVSNX-ZMKCT-[id number] Left 11/9/2016    Performed by CHARLI Rey III, MD at [facility] OR 2ND FLR    FZTIGHRQE-VGCGO-NRLWWDGVYJKBX Femoral - Right vs Left Left 2/20/2017    Performed by CHARLI Rey III, MD at [facility] OR 2ND FLR    TSDUOAMH-ENFQJNE-YC Left 11/10/2016    Performed by CHARLI Rey III, MD at [facility] OR 2ND FLR    permcath placement      PHACOEMULSIFICATION, CATARACT Left 8/21/2018    Performed by Abdi Kaye MD at [facility] OR 1ST FLR    PHACOEMULSIFICATION, CATARACT Right 8/7/2018    Performed by Abdi Kaye MD at [facility] OR 1ST FLR       Review of patient's allergies indicates:   Allergen Reactions    Ramipril Other (See Comments)     Other reaction(s): deathly ill      Simvastatin Other (See Comments)     Other reaction(s): Deathly ill      Hydralazine Other (See Comments)     Pt. states "tiredness."       Current Facility-Administered Medications   Medication Frequency    chlorhexidine 0.12 % solution 15 mL BID    dextrose 10% (D10W) Bolus PRN    dextrose 10% (D10W) Bolus PRN    famotidine (PF) injection 20 mg Daily    glucagon (human recombinant) injection 1 mg PRN    glucose chewable tablet 16 g PRN    glucose chewable tablet 24 g PRN    heparin (porcine) injection 5,000 Units Q8H    insulin aspart U-100 pen 0-5 Units QID (AC + HS) PRN    magnesium sulfate 2g in water 50mL IVPB (premix) PRN    magnesium sulfate 2g in water 50mL IVPB (premix) PRN    " norepinephrine bitartrate-D5W 4 mg/250 mL (16 mcg/mL) infusion Soln     ondansetron disintegrating tablet 8 mg Q8H PRN    piperacillin-tazobactam 4.5 g in sodium chloride 0.9% 100 mL IVPB (ready to mix system) Q12H    potassium chloride 10% oral solution 40 mEq PRN    potassium chloride 10% oral solution 40 mEq PRN    potassium chloride 10% oral solution 60 mEq PRN    rocuronium (ZEMURON) 10 mg/mL injection     sodium chloride 0.9% flush 10 mL PRN     No current outpatient medications on file.     Facility-Administered Medications Ordered in Other Encounters   Medication Frequency    0.9%  NaCl infusion Continuous    cyclopentolate 1% ophthalmic solution 1 drop On Call Procedure    phenylephrine HCL 2.5% ophthalmic solution 1 drop On Call Procedure    proparacaine 0.5 % ophthalmic solution 1 drop On Call Procedure    tropicamide 1% ophthalmic solution 1 drop On Call Procedure     Family History     Problem Relation (Age of Onset)    Cataracts Cousin    Diabetes Paternal Aunt    Pneumonia Brother        Tobacco Use    Smoking status: Never Smoker    Smokeless tobacco: Never Used   Substance and Sexual Activity    Alcohol use: No     Comment: quit since she started dialysis; previously was only having wine occasionally and in her younger years would have whiskey/soda, etc    Drug use: No    Sexual activity: Not on file     Review of Systems   Constitutional: Negative for activity change, appetite change, chills, fatigue and fever.   HENT: Negative for congestion.    Respiratory: Negative for cough and shortness of breath.    Cardiovascular: Negative for chest pain and palpitations.   Gastrointestinal: Negative for diarrhea and nausea.   Genitourinary: Positive for decreased urine volume. Negative for dysuria.   Musculoskeletal: Negative for back pain.   Skin: Negative for wound.   Neurological: Negative for light-headedness and headaches.   Psychiatric/Behavioral: Negative for confusion.      Objective:     Vital Signs (Most Recent):  Temp: 97.8 °F (36.6 °C) (07/01/19 1801)  Pulse: (!) 135 (07/02/19 0028)  Resp: (!) 30 (07/02/19 0028)  BP: 120/63 (07/02/19 0028)  SpO2: (!) 12 % (07/02/19 0028)  O2 Device (Oxygen Therapy): ventilator (07/02/19 0028) Vital Signs (24h Range):  Temp:  [97.8 °F (36.6 °C)] 97.8 °F (36.6 °C)  Pulse:  [] 135  Resp:  [21-33] 30  SpO2:  [12 %-100 %] 12 %  BP: ()/(40-63) 120/63     Weight: 63.5 kg (140 lb) (07/01/19 1700)  Body mass index is 27.34 kg/m².  Body surface area is 1.64 meters squared.    I/O last 3 completed shifts:  In: 2416.7 [I.V.:2066.7; IV Piggyback:350]  Out: -     Physical Exam   Constitutional: She is oriented to person, place, and time. She appears well-developed and well-nourished. No distress.   HENT:   Head: Normocephalic and atraumatic.   Eyes: Conjunctivae are normal. No scleral icterus.   Neck: Normal range of motion.   Cardiovascular: Regular rhythm and intact distal pulses.   Murmur (harsh midsystolic murmur heard best at left sternal border) heard.  Bradycardia present   Pulmonary/Chest: Effort normal and breath sounds normal.   On 2 L per NC   Abdominal: Soft. She exhibits no distension. There is no tenderness.   Musculoskeletal: Normal range of motion. She exhibits no edema.   Neurological: She is alert and oriented to person, place, and time.   Skin: Skin is warm and dry.   Psychiatric: She has a normal mood and affect. Her behavior is normal.   Nursing note and vitals reviewed.      Significant Labs:  ABGs:   Recent Labs   Lab 07/02/19 0028   PH 7.187*   PCO2 50.4*   HCO3 19.1*   POCSATURATED 49*   BE -9     CBC:   Recent Labs   Lab 07/02/19  0028 07/02/19  0028   WBC 22.19*  --    RBC 3.52*  --    HGB 11.6*  --    HCT 36.5* 37   PLT 55*  --    *  --    MCH 33.0*  --    MCHC 31.8*  --      CMP:   Recent Labs   Lab 07/01/19  2352   *   CALCIUM 12.7*   ALBUMIN 2.9*   PROT 6.0   *   K 5.6*   CO2 15*      BUN  35*   CREATININE 5.2*   ALKPHOS 86   *   *   BILITOT 1.1*     Coagulation:   Recent Labs   Lab 07/01/19  2355   INR 2.5*   APTT 59.4*     All labs within the past 24 hours have been reviewed.    Significant Imaging:  Labs: Reviewed    Assessment/Plan:     ESRD (end stage renal disease) on dialysis  Patient evaluated in the ER  At this moment patient doesn't need emergent RRT  Should patient deteriorate would consider SLED while in the ICU  Will follow closely        Thank you for your consult. I will follow-up with patient. Please contact us if you have any additional questions.    Harman Martínez MD  Nephrology  [facility]

## 2019-07-02 NOTE — ASSESSMENT & PLAN NOTE
DDx:   - Cardiogenic shock (elevated troponin, bradycardia, though no ischemic changes on EKG)   - Evaluated by Cardiology in ED, did not feel that patient was having ACS   - STAT echo ordered, will trend troponin  - Excessive BP meds, consistent with patient's history   - Will hold antihypertensives  - Septic shock (leukocytosis, but no localizing symptoms)   - Started on Vanc and zosyn in ED, will continue for 48 hours with low threshold to d/c should cultures remain negative  - VTE event   - CTA ordered

## 2019-07-02 NOTE — ASSESSMENT & PLAN NOTE
Lactic 7 on admission, trended down to 5 with administration of fluids  Will recheck on am draw as patient's BP is improving  Further treatment per Bradycardia

## 2019-07-02 NOTE — PROGRESS NOTES
"74 yof who presented earlier this evening sent here from dialysis where she was noted to be hypotensive. I interviewed her at ~2130 this evening. During my interview, the pt had no complaints. Her exact words were, "I feel fine. I'm just here because they told me I needed to come." She specifically denied chest pain, abdominal pain, or dyspnea. She asked if she could eat, & she asked for us to get her a bed outside of the ED because she was tired of waiting there.    She was noted to have a lactic acidosis. BP was 95/45. Rhythm was sinus bradycardia with a rate of 46. There were no neurologic deficits on exam. Her breathing was non-labored, & I did not appreciate any rales though she was diminished at the right base. At this time the pt had received 1.5L of crystalloid & the lactate was clearing.    At this time, we were concerned about poor perfusion as evidenced by her lactic acidosis. Our differential at the time included PE, acute-on-chronic right heart failure, sepsis, & NSTEMI. We did feel that the problem was exacerbated by her inappropriately slow heart rate response. The pt was actively taking her coreg 25 BID, & she reported to us that her doctor had recently stopped amlodipine due to low blood pressure reading in the office, so we decided to hold her anti-hypertensives & coreg.    Shortly after undergoing her CTA examination, we were alerted by the RN that the patient had a decrease in her heart rate. The blood pressure was reported to be 92/45 with no change in mentation. We ordered atropine & a repeat ECG.    Before we could arrive at the room, we were updated that the pt had gone into PEA cardiac arrest.    ROSC was achieved several times. During intervals of ROSC, the heart rate was now 100. Oxygen saturation was 100% on the monitor. Nonetheless, the pt kept having recurrence of PEA arrest. During last pulse check, rapid bedside echo was done & showed no cardiac activity. At 12:31am, we determined " that the pt was not responding to resuscitation & efforts were stopped.     My final assessment includes the followin) Lactic acidosis. In hindsight, I suspect this was due to acute on chronic right heart failure with blunted heart rate response from beta blocker. Sepsis is possible, but the pt did not exhibit any strong indicators of infection.  2) Acute on chronic right heart failure with pulmonary hypertension dating back several years. LFTs elevated. Troponin & BNP elevated. Stat echo was requested to assess RV function. I don't see evidence of an inferior MI on ECG. PE was ruled out with CTA.  3) Sinus bradycardia. The presence of lactic acidosis indicated inadequate perfusion. The lactate was trending in the right direction with supportive measures, & we were avoiding positive chronotropes in the setting of possible acute MI. Unfortunately the pt suffered a very acute decline that did not permit time to escalate treatment.  4) Cardiac arrest. Again, suspect this was due to acute on chronic right heart failure with blunted heart rate response from beta blocker.    Very difficult case.    I met with the patient's daughter, brother-in-law, & sister-in-law. I shared the terrible news that Miss Salcedo had passed.    Critical Care Time: 130 minutes  Critical care was time spent personally by me on the following activities: evaluating this patient's organ dysfunction, development of treatment plan, discussing treatment plan with patient or surrogate and bedside caregivers, discussions with consultants, evaluation of patient's response to treatment, examination of patient, ordering and performing treatments and interventions, ordering and review of laboratory studies, ordering and review of radiographic studies, re-evaluation of patient's condition. This critical care time did not overlap with that of any other provider or involve time for any procedures.    Barber Caballero MD  [facility]  Medicine

## 2019-07-02 NOTE — ASSESSMENT & PLAN NOTE
Lactic 7 on admission, trended down to 5 with administration of fluids  Will recheck on am draw as patient's BP is improving  Further treatment per Shock

## 2019-07-02 NOTE — ASSESSMENT & PLAN NOTE
Likely due to excessive BB and/or CCB  Hold antihypertensives and monitor  EKG sinus yolanda, patient's BP improving

## 2019-07-02 NOTE — SUBJECTIVE & OBJECTIVE
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, without long-term current use of insulin     Diabetes mellitus type II     Diastolic heart failure     ESRD on dialysis since 6/10/16     Essential hypertension     Hyperlipidemia     Hypertension     Hypervolemia     LVH (left ventricular hypertrophy)     Moderate tricuspid regurgitation     PAOD (peripheral arterial occlusive disease)     Pleural effusion     Pulmonary hypertension     Pulmonary nodules     Secondary hyperparathyroidism of renal origin     Venous insufficiency of both lower extremities     Vitamin D deficiency        Past Surgical History:   Procedure Laterality Date    ADRENAL GLAND SURGERY  1990s    removed for tumor which she states was benign     AV graft      BIOPSY-KIDNEY Bilateral 1/29/2016    Performed by [facility] Diagnostic Provider at [facility] OR 2ND FLR    CATARACT EXTRACTION W/  INTRAOCULAR LENS IMPLANT Right 08/07/2018    Dr. Kaye    HYSTERECTOMY  1990s    removed for tumor which she states was benign     INSERTION, INTRAOCULAR LENS PROSTHESIS Left 8/21/2018    Performed by Abdi Kaye MD at [facility] OR 1ST FLR    INSERTION, INTRAOCULAR LENS PROSTHESIS Right 8/7/2018    Performed by Abdi [facility]e MD at [facility] OR 1ST FLR    BCEOPBYJQ-FUCYS-VVRLXSUBLXHAD Left 11/9/2016    Performed by CHARLI Rey III, MD at [facility] OR 2ND FLR    [id number]-[id number] Femoral - Right vs Left Left 2/20/2017    Performed by CHARLI Rey III, MD at [facility] OR 2ND FLR    GRFBDOVS-UOLOTTI-UT Left 11/10/2016    Performed by CHARLI Rey III, MD at [facility] OR 2ND FLR  "   permcath placement      PHACOEMULSIFICATION, CATARACT Left 8/21/2018    Performed by [facility] at [facility] 1ST FLR    PHACOEMULSIFICATION, CATARACT Right 8/7/2018    Performed by Abdi Kaye MD at [facility] OR 1ST FLR       Review of patient's allergies indicates:   Allergen Reactions    Ramipril Other (See Comments)     Other reaction(s): deathly ill      Simvastatin Other (See Comments)     Other reaction(s): Deathly ill      Hydralazine Other (See Comments)     Pt. states "tiredness."       Current Facility-Administered Medications   Medication Frequency    chlorhexidine 0.12 % solution 15 mL BID    dextrose 10% (D10W) Bolus PRN    dextrose 10% (D10W) Bolus PRN    famotidine (PF) injection 20 mg Daily    glucagon (human recombinant) injection 1 mg PRN    glucose chewable tablet 16 g PRN    glucose chewable tablet 24 g PRN    heparin (porcine) injection 5,000 Units Q8H    insulin aspart U-100 pen 0-5 Units QID (AC + HS) PRN    magnesium sulfate 2g in water 50mL IVPB (premix) PRN    magnesium sulfate 2g in water 50mL IVPB (premix) PRN    norepinephrine bitartrate-D5W 4 mg/250 mL (16 mcg/mL) infusion Soln     ondansetron disintegrating tablet 8 mg Q8H PRN    piperacillin-tazobactam 4.5 g in sodium chloride 0.9% 100 mL IVPB (ready to mix system) Q12H    potassium chloride 10% oral solution 40 mEq PRN    potassium chloride 10% oral solution 40 mEq PRN    potassium chloride 10% oral solution 60 mEq PRN    rocuronium (ZEMURON) 10 mg/mL injection     sodium chloride 0.9% flush 10 mL PRN     No current outpatient medications on file.     Facility-Administered Medications Ordered in Other Encounters   Medication Frequency    0.9%  NaCl infusion Continuous    cyclopentolate 1% ophthalmic solution 1 drop On Call Procedure    phenylephrine HCL 2.5% ophthalmic solution 1 drop On Call Procedure    proparacaine 0.5 % ophthalmic solution 1 drop On Call Procedure    tropicamide " 1% ophthalmic solution 1 drop On Call Procedure     Family History     Problem Relation (Age of Onset)    Cataracts Cousin    Diabetes Paternal Aunt    Pneumonia Brother        Tobacco Use    Smoking status: Never Smoker    Smokeless tobacco: Never Used   Substance and Sexual Activity    Alcohol use: No     Comment: quit since she started dialysis; previously was only having wine occasionally and in her younger years would have whiskey/soda, etc    Drug use: No    Sexual activity: Not on file     Review of Systems   Constitutional: Negative for activity change, appetite change, chills, fatigue and fever.   HENT: Negative for congestion.    Respiratory: Negative for cough and shortness of breath.    Cardiovascular: Negative for chest pain and palpitations.   Gastrointestinal: Negative for diarrhea and nausea.   Genitourinary: Positive for decreased urine volume. Negative for dysuria.   Musculoskeletal: Negative for back pain.   Skin: Negative for wound.   Neurological: Negative for light-headedness and headaches.   Psychiatric/Behavioral: Negative for confusion.     Objective:     Vital Signs (Most Recent):  Temp: 97.8 °F (36.6 °C) (07/01/19 1801)  Pulse: (!) 135 (07/02/19 0028)  Resp: (!) 30 (07/02/19 0028)  BP: 120/63 (07/02/19 0028)  SpO2: (!) 12 % (07/02/19 0028)  O2 Device (Oxygen Therapy): ventilator (07/02/19 0028) Vital Signs (24h Range):  Temp:  [97.8 °F (36.6 °C)] 97.8 °F (36.6 °C)  Pulse:  [] 135  Resp:  [21-33] 30  SpO2:  [12 %-100 %] 12 %  BP: ()/(40-63) 120/63     Weight: 63.5 kg (140 lb) (07/01/19 1700)  Body mass index is 27.34 kg/m².  Body surface area is 1.64 meters squared.    I/O last 3 completed shifts:  In: 2416.7 [I.V.:2066.7; IV Piggyback:350]  Out: -     Physical Exam   Constitutional: She is oriented to person, place, and time. She appears well-developed and well-nourished. No distress.   HENT:   Head: Normocephalic and atraumatic.   Eyes: Conjunctivae are normal. No scleral  icterus.   Neck: Normal range of motion.   Cardiovascular: Regular rhythm and intact distal pulses.   Murmur (harsh midsystolic murmur heard best at left sternal border) heard.  Bradycardia present   Pulmonary/Chest: Effort normal and breath sounds normal.   On 2 L per NC   Abdominal: Soft. She exhibits no distension. There is no tenderness.   Musculoskeletal: Normal range of motion. She exhibits no edema.   Neurological: She is alert and oriented to person, place, and time.   Skin: Skin is warm and dry.   Psychiatric: She has a normal mood and affect. Her behavior is normal.   Nursing note and vitals reviewed.      Significant Labs:  ABGs:   Recent Labs   Lab 07/02/19 0028   PH 7.187*   PCO2 50.4*   HCO3 19.1*   POCSATURATED 49*   BE -9     CBC:   Recent Labs   Lab 07/02/19 0028 07/02/19 0028   WBC 22.19*  --    RBC 3.52*  --    HGB 11.6*  --    HCT 36.5* 37   PLT 55*  --    *  --    MCH 33.0*  --    MCHC 31.8*  --      CMP:   Recent Labs   Lab 07/01/19  2352   *   CALCIUM 12.7*   ALBUMIN 2.9*   PROT 6.0   *   K 5.6*   CO2 15*      BUN 35*   CREATININE 5.2*   ALKPHOS 86   *   *   BILITOT 1.1*     Coagulation:   Recent Labs   Lab 07/01/19  2355   INR 2.5*   APTT 59.4*     All labs within the past 24 hours have been reviewed.    Significant Imaging:  Labs: Reviewed

## 2019-07-02 NOTE — CARE UPDATE
RT called to trauma 3 because patient was coding. CPR was started. Patient was placed on vent at documented settings but had to be taken off and put on ambu bag when pulse was lost. Patient was bagged until TOD was called.

## 2019-07-02 NOTE — CONSULTS
[facility]  Interventional Cardiology  Consult Note    Patient Name: Beth Salcedo  MRN: [id number]  Admission Date: 7/1/2019  Hospital Length of Stay: 0 days  Code Status: Full Code   Attending Provider: Miguel Carpenter, *  Consulting Provider: Thor Vargas III, MD  Primary Care Physician: Emili Sanchez MD  Principal Problem:<principal problem not specified>    Patient information was obtained from patient, past medical records and ER records.     Inpatient consult to Cardiology  Consult performed by: Thor Vargas III, MD  Consult ordered by: Danny Guevara MD        Subjective:     Chief Complaint:  hypotension     HPI:  Pt is a 75 yo F w/ PMH of HTN, ESRD on HD MWF, DM2, and diastolic dysfunction who presents to the ED following presenting to dialysis w/ bradycardiac and hypotension.  She recently has been undergoing adjustments to her medical therapy for HTN w/ Renal given low BP.  She c/o dyspnea but unchanged from her baseline however denies any cp, edema, orthopnea, PND, presyncope, palpitations, or claudication.  She presented to the ED w/ hypotension and bradycardia w/ leukocytosis and lactic acidosis.  She was noted to have a trop of 3 and cardiology for consulted for evaluation.  She denies any f/c/s.        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, without long-term current use of insulin     Diabetes mellitus type II     Diastolic heart failure     ESRD on dialysis since 6/10/16     Essential hypertension     Hyperlipidemia     Hypertension     Hypervolemia     LVH (left ventricular  "hypertrophy)     Moderate tricuspid regurgitation     PAOD (peripheral arterial occlusive disease)     Pleural effusion     Pulmonary hypertension     Pulmonary nodules     Secondary hyperparathyroidism of renal origin     Venous insufficiency of both lower extremities     Vitamin D deficiency        Past Surgical History:   Procedure Laterality Date    ADRENAL GLAND SURGERY  1990s    removed for tumor which she states was benign     AV graft      BIOPSY-KIDNEY Bilateral 1/29/2016    Performed by [facility] OR 2ND FLR    CATARACT EXTRACTION W/  INTRAOCULAR LENS IMPLANT Right 08/07/2018    Dr. Kaye    HYSTERECTOMY  1990s    removed for tumor which she states was benign     INSERTION, INTRAOCULAR LENS PROSTHESIS Left 8/21/2018    Performed by Abdi Kaye MD at [facility] OR 1ST FLR    INSERTION, INTRAOCULAR LENS PROSTHESIS Right 8/7/2018    Performed by [facility]e [facility] at [facility] OR 1ST FLR    HBHDVSOVB-[id number] Left 11/9/2016    Performed by CHARLI Rey III, MD at [facility] OR 2ND FLR    [id number] Femoral - Right vs Left Left 2/20/2017    Performed by CHARLI Rey III, MD at [facility] OR 2ND FLR    [id number]-ZR Left 11/10/2016    Performed by CHARLI Rey III, MD at [facility] OR 2ND FLR    permcath placement      PHACOEMULSIFICATION, CATARACT Left 8/21/2018    Performed by Abdi Kaye MD at [facility] OR 1ST FLR    PHACOEMULSIFICATION, CATARACT Right 8/7/2018    Performed by Abdi Kaye MD at [facility] OR 1ST FLR       Review of patient's allergies indicates:   Allergen Reactions    Ramipril Other (See Comments)     Other reaction(s): deathly ill      Simvastatin Other (See Comments)     Other reaction(s): Deathly ill      Hydralazine Other (See Comments)     Pt. states "tiredness."           (Not in a hospital admission)  Family History     Problem Relation (Age of Onset)    Cataracts Cousin    Diabetes " Paternal Aunt    Pneumonia Brother        Tobacco Use    Smoking status: Never Smoker    Smokeless tobacco: Never Used   Substance and Sexual Activity    Alcohol use: No     Comment: quit since she started dialysis; previously was only having wine occasionally and in her younger years would have whiskey/soda, etc    Drug use: No    Sexual activity: Not on file     Review of Systems   Constitution: Negative for diaphoresis and fever.   HENT: Negative for congestion and hearing loss.    Eyes: Negative for blurred vision and pain.   Cardiovascular: Negative for chest pain, claudication, dyspnea on exertion, leg swelling, near-syncope, palpitations and syncope.   Respiratory: Positive for shortness of breath. Negative for sleep disturbances due to breathing.    Hematologic/Lymphatic: Negative for bleeding problem. Does not bruise/bleed easily.   Skin: Negative for color change and poor wound healing.   Gastrointestinal: Negative for abdominal pain and nausea.   Genitourinary: Negative for bladder incontinence and flank pain.   Neurological: Negative for focal weakness and light-headedness.     Objective:     Vital Signs (Most Recent):  Temp: 97.8 °F (36.6 °C) (07/01/19 1801)  Pulse: (!) 47 (07/01/19 2142)  Resp: (!) 30 (07/01/19 2054)  BP: (!) 90/46 (07/01/19 2142)  SpO2: (!) 94 % (07/01/19 2142) Vital Signs (24h Range):  Temp:  [97.8 °F (36.6 °C)] 97.8 °F (36.6 °C)  Pulse:  [42-66] 47  Resp:  [21-33] 30  SpO2:  [87 %-100 %] 94 %  BP: ()/(40-54) 90/46     Weight: 63.5 kg (140 lb)  Body mass index is 27.34 kg/m².    SpO2: (!) 94 %  O2 Device (Oxygen Therapy): room air    No intake or output data in the 24 hours ending 07/01/19 2210    Lines/Drains/Airways     Central Venous Catheter Line                 Hemodialysis Catheter right subclavian -- days          Drain                 Hemodialysis AV Graft 02/20/17 0835 Left thigh 861 days          Peripheral Intravenous Line                 Peripheral IV - Single  Lumen 08/21/18 0913 Right Hand 314 days         Peripheral IV - Single Lumen 07/01/19 1725 22 G Right Forearm less than 1 day         Peripheral IV - Single Lumen 07/01/19 1850 20 G Right Forearm less than 1 day                Physical Exam   Constitutional: She is oriented to person, place, and time. She appears well-developed and well-nourished.   HENT:   Head: Normocephalic and atraumatic.   Mouth/Throat: Oropharynx is clear and moist.   Eyes: Pupils are equal, round, and reactive to light. EOM are normal. No scleral icterus.   Neck: Normal range of motion. Neck supple. No JVD present.   Cardiovascular: Normal rate, regular rhythm, S1 normal, S2 normal and intact distal pulses. Exam reveals no gallop and no friction rub.   Murmur heard.  Pulmonary/Chest: Effort normal and breath sounds normal. No respiratory distress. She has no wheezes. She has no rales. She exhibits no tenderness.   Abdominal: Soft. Bowel sounds are normal. She exhibits no distension and no mass. There is no tenderness. There is no rebound.   Musculoskeletal: Normal range of motion. She exhibits no edema or tenderness.   Neurological: She is alert and oriented to person, place, and time. She displays normal reflexes. Coordination normal.   Skin: Skin is warm and dry. She is not diaphoretic. No pallor.   Psychiatric: She has a normal mood and affect. Her behavior is normal. Judgment normal.       Significant Labs:   BMP:   Recent Labs   Lab 07/01/19  1735         K 4.7      CO2 16*   BUN 27*   CREATININE 4.8*   CALCIUM 8.7   MG 2.0   , CBC   Recent Labs   Lab 07/01/19  1735   WBC 14.73*   HGB 11.7*   HCT 36.6*   PLT 83*   , INR   Recent Labs   Lab 07/01/19  1735   INR 2.2*   ,   Pathology Results  (Last 10 years)    None      , Troponin   Recent Labs   Lab 07/01/19  1735   TROPONINI 3.628*    and All pertinent lab results from the last 24 hours have been reviewed.    Significant Imaging: Echocardiogram:   2D echo with color  flow doppler:   Results for orders placed or performed during the hospital encounter of 08/26/17   2D echo with color flow doppler   Result Value Ref Range    QEF 60 55 - 65    Est. PA Systolic Pressure 80.44 (A)     Pericardial Effusion TRIVIAL     Mitral Valve Mobility NORMAL     Tricuspid Valve Regurgitation MODERATE (A)     Narrative    Date of Procedure: 08/27/2017        TEST DESCRIPTION   Technical Quality: This is a portable study performed at the patient's bedside. This is a technically challenging study.     Aorta: The aortic root is normal in size, measuring 2.2 cm at sinotubular junction and 2.9 cm at Sinuses of Valsalva. The proximal ascending aorta is normal in size, measuring 2.8 cm across.     Left Atrium: The left atrial volume index is severely enlarged, measuring 84.64 cc/m2.     Left Ventricle: The left ventricle is normal in size, with an end-diastolic diameter of 5.0 cm, and an end-systolic diameter of 3.0 cm. Septal wall thickness is mildly increased, with the septum measuring 1.1 cm and the posterior wall measuring 0.9 cm   across. Relative wall thickness was normal at 0.36, and the LV mass index was increased at 134.0 g/m2 consistent with eccentric left ventricular hypertrophy. There are no regional wall motion abnormalities. Left ventricular systolic function appears   normal. Visually estimated ejection fraction is 60-65%. The LV Doppler derived stroke volume equals 142.0 ccs.         Right Atrium: The right atrium is moderately enlarged, measuring 5.1 cm in length and 4.4 cm in width in the apical view.     Right Ventricle: The right ventricle is mildly enlarged measuring 4.3 cm at the base in the apical right ventricle-focused view. Global right ventricular systolic function appears mildly depressed. Tricuspid annular plane systolic excursion (TAPSE) is   1.5 cm. Tissue Doppler-derived tricuspid annular peak systolic velocity (S prime) is 9.3 cm/s. The estimated PA systolic pressure is  80 mmHg.     Aortic Valve:  The aortic valve is mildly sclerotic with normal leaflet mobility. The peak velocity obtained across the aortic valve is 2.46 m/s, which translates to a peak gradient of 24 mmHg. The mean gradient is 13 mmHg. Using a left ventricular   outflow tract diameter of 2.0 cm, a left ventricular outflow tract velocity time integral of 46 cm, and a peak instantaneous transvalvular velocity time integral of 60 cm, the calculated aortic valve area is 2.36 cm2.     Mitral Valve:  The mitral valve is normal in structure with normal leaflet mobility. There is marked mitral annular calcification.     Tricuspid Valve:  The tricuspid valve is normal in structure with normal leaflet mobility. There is moderate tricuspid regurgitation.     Pulmonary Valve:  There is mild pulmonic regurgitation.     Pericardium: There is evidence of a trivial postero-lateral pericardial effusion.     IVC: IVC is normal in size and collapses > 50% with a sniff, suggesting normal right atrial pressure of 3 mmHg.     Intracavitary: There is no evidence of intracavity mass, thrombi, or vegetation.         CONCLUSIONS     1 - Severe left atrial enlargement.     2 - Moderate right atrial enlargement.     3 - Eccentric hypertrophy.     4 - Normal left ventricular systolic function (EF 60-65%).     5 - Right ventricular enlargement with mildly depressed systolic function.     6 - Moderate tricuspid regurgitation.     7 - Pulmonary hypertension. The estimated PA systolic pressure is 80 mmHg.     8 - Trivial pericardial effusion.             This document has been electronically    SIGNED BY: Ronald Kate MD On: 08/28/2017 08:48    and Transthoracic echo (TTE) complete (Cupid Only): No results found for this or any previous visit., EKG: reviewed.  Sinus yolanda, nonspecific ST changes, RVH, no ischemic changes and X-Ray: CXR: X-Ray Chest 1 View (CXR): No results found for this visit on 07/01/19. and X-Ray Chest PA and Lateral (CXR):  No results found for this visit on 07/01/19.    Assessment and Plan:     Elevated troponin  Likely demand ischemia given presentation of sepsis.  Not c/w ACS given no angina or ECG changes.   - may trend until decline  - treat sepsis   - may consider o/p stress when stable    (HFpEF) heart failure with preserved ejection fraction  Compensated clinically however CXR notes some increased pulmonary vascular markings.    - a/w echo to eval cardiac function  - would eval source of sepsis and treat  - continue HD w/ UF to maintain euvolemia when BP able to tolerate    Essential hypertension  Given hypotensive presentation and sepsis would hold antihypertensives.          VTE Risk Mitigation (From admission, onward)        Ordered     heparin (porcine) injection 5,000 Units  Every 8 hours      07/01/19 2106     IP VTE HIGH RISK PATIENT  Once      07/01/19 2106          Thank you for your consult. I will follow-up with patient. Please contact us if you have any additional questions.    Thor Vargas III, MD  Interventional Cardiology   [facility]

## 2019-07-02 NOTE — HPI
Pt is a 73 yo F w/ PMH of HTN, ESRD on HD MWF, DM2, and diastolic dysfunction who presents to the ED following presenting to dialysis w/ bradycardiac and hypotension.  She recently has been undergoing adjustments to her medical therapy for HTN w/ Renal given low BP.  She c/o dyspnea but unchanged from her baseline however denies any cp, edema, orthopnea, PND, presyncope, palpitations, or claudication.  She presented to the ED w/ hypotension and bradycardia w/ leukocytosis and lactic acidosis.  She was noted to have a trop of 3 and cardiology for consulted for evaluation.  She denies any f/c/s.

## 2019-07-02 NOTE — CONSULTS
Consult to Critical Care Medicine received. Patient to be evaluated and discussed with Critical Care staff, full note to follow.    Danny Guevara MD  Internal Medicine, PGY-3

## 2019-07-02 NOTE — ASSESSMENT & PLAN NOTE
Compensated clinically however CXR notes some increased pulmonary vascular markings.    - a/w echo to eval cardiac function  - would eval source of sepsis and treat  - continue HD w/ UF to maintain euvolemia when BP able to tolerate

## 2019-07-02 NOTE — SUBJECTIVE & OBJECTIVE
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, without long-term current use of insulin     Diabetes mellitus type II     Diastolic heart failure     ESRD on dialysis since 6/10/16     Essential hypertension     Hyperlipidemia     Hypertension     Hypervolemia     LVH (left ventricular hypertrophy)     Moderate tricuspid regurgitation     PAOD (peripheral arterial occlusive disease)     Pleural effusion     Pulmonary hypertension     Pulmonary nodules     Secondary hyperparathyroidism of renal origin     Venous insufficiency of both lower extremities     Vitamin D deficiency        Past Surgical History:   Procedure Laterality Date    ADRENAL GLAND SURGERY  1990s    removed for tumor which she states was benign     AV graft      BIOPSY-KIDNEY Bilateral 1/29/2016    Performed by [facility] at [facility] OR 2ND FLR    CATARACT EXTRACTION W/  INTRAOCULAR LENS IMPLANT Right 08/07/2018    Dr. Kaye    HYSTERECTOMY  1990s    removed for tumor which she states was benign     INSERTION, INTRAOCULAR LENS PROSTHESIS Left 8/21/2018    Performed by Abdi Kaye MD at [facility] OR 1ST FLR    INSERTION, INTRAOCULAR LENS PROSTHESIS Right 8/7/2018    Performed by Abdi Kaye MD at [facility] OR 1ST FLR    UIIVKOBMA-TOAVP-NEELQUNQOTCTO Left 11/9/2016    Performed by CHARLI Rey III, MD at [facility] OR 2ND FLR    JQERQZPLV-ECLFD-DKKLWUMLURDKC Femoral - Right vs Left Left 2/20/2017    Performed by CHARLI Rey III, MD at [facility] OR 2ND FLR    PDUEIIUB-PQETRLP-KJ Left 11/10/2016    Performed by CHARLI Rey III, MD at [facility] OR 2ND FLR  "   permcath placement      PHACOEMULSIFICATION, CATARACT Left 8/21/2018    Performed by Abdi Kaye MD at [facility] OR 1ST FLR    PHACOEMULSIFICATION, CATARACT Right 8/7/2018    Performed by Abdi Kaye MD at [facility] OR 1ST FLR       Review of patient's allergies indicates:   Allergen Reactions    Ramipril Other (See Comments)     Other reaction(s): deathly ill      Simvastatin Other (See Comments)     Other reaction(s): Deathly ill      Hydralazine Other (See Comments)     Pt. states "tiredness."           (Not in a hospital admission)  Family History     Problem Relation (Age of Onset)    Cataracts Cousin    Diabetes Paternal Aunt    Pneumonia Brother        Tobacco Use    Smoking status: Never Smoker    Smokeless tobacco: Never Used   Substance and Sexual Activity    Alcohol use: No     Comment: quit since she started dialysis; previously was only having wine occasionally and in her younger years would have whiskey/soda, etc    Drug use: No    Sexual activity: Not on file     Review of Systems   Constitution: Negative for diaphoresis and fever.   HENT: Negative for congestion and hearing loss.    Eyes: Negative for blurred vision and pain.   Cardiovascular: Negative for chest pain, claudication, dyspnea on exertion, leg swelling, near-syncope, palpitations and syncope.   Respiratory: Positive for shortness of breath. Negative for sleep disturbances due to breathing.    Hematologic/Lymphatic: Negative for bleeding problem. Does not bruise/bleed easily.   Skin: Negative for color change and poor wound healing.   Gastrointestinal: Negative for abdominal pain and nausea.   Genitourinary: Negative for bladder incontinence and flank pain.   Neurological: Negative for focal weakness and light-headedness.     Objective:     Vital Signs (Most Recent):  Temp: 97.8 °F (36.6 °C) (07/01/19 1801)  Pulse: (!) 47 (07/01/19 2142)  Resp: (!) 30 (07/01/19 2054)  BP: (!) 90/46 (07/01/19 2142)  SpO2: (!) 94 % " (07/01/19 2142) Vital Signs (24h Range):  Temp:  [97.8 °F (36.6 °C)] 97.8 °F (36.6 °C)  Pulse:  [42-66] 47  Resp:  [21-33] 30  SpO2:  [87 %-100 %] 94 %  BP: ()/(40-54) 90/46     Weight: 63.5 kg (140 lb)  Body mass index is 27.34 kg/m².    SpO2: (!) 94 %  O2 Device (Oxygen Therapy): room air    No intake or output data in the 24 hours ending 07/01/19 2210    Lines/Drains/Airways     Central Venous Catheter Line                 Hemodialysis Catheter right subclavian -- days          Drain                 Hemodialysis AV Graft 02/20/17 0835 Left thigh 861 days          Peripheral Intravenous Line                 Peripheral IV - Single Lumen 08/21/18 0913 Right Hand 314 days         Peripheral IV - Single Lumen 07/01/19 1725 22 G Right Forearm less than 1 day         Peripheral IV - Single Lumen 07/01/19 1850 20 G Right Forearm less than 1 day                Physical Exam   Constitutional: She is oriented to person, place, and time. She appears well-developed and well-nourished.   HENT:   Head: Normocephalic and atraumatic.   Mouth/Throat: Oropharynx is clear and moist.   Eyes: Pupils are equal, round, and reactive to light. EOM are normal. No scleral icterus.   Neck: Normal range of motion. Neck supple. No JVD present.   Cardiovascular: Normal rate, regular rhythm, S1 normal, S2 normal and intact distal pulses. Exam reveals no gallop and no friction rub.   Murmur heard.  Pulmonary/Chest: Effort normal and breath sounds normal. No respiratory distress. She has no wheezes. She has no rales. She exhibits no tenderness.   Abdominal: Soft. Bowel sounds are normal. She exhibits no distension and no mass. There is no tenderness. There is no rebound.   Musculoskeletal: Normal range of motion. She exhibits no edema or tenderness.   Neurological: She is alert and oriented to person, place, and time. She displays normal reflexes. Coordination normal.   Skin: Skin is warm and dry. She is not diaphoretic. No pallor.    Psychiatric: She has a normal mood and affect. Her behavior is normal. Judgment normal.       Significant Labs:   BMP:   Recent Labs   Lab 07/01/19  1735         K 4.7      CO2 16*   BUN 27*   CREATININE 4.8*   CALCIUM 8.7   MG 2.0   , CBC   Recent Labs   Lab 07/01/19  1735   WBC 14.73*   HGB 11.7*   HCT 36.6*   PLT 83*   , INR   Recent Labs   Lab 07/01/19  1735   INR 2.2*   ,   Pathology Results  (Last 10 years)    None      , Troponin   Recent Labs   Lab 07/01/19  1735   TROPONINI 3.628*    and All pertinent lab results from the last 24 hours have been reviewed.    Significant Imaging: Echocardiogram:   2D echo with color flow doppler:   Results for orders placed or performed during the hospital encounter of 08/26/17   2D echo with color flow doppler   Result Value Ref Range    QEF 60 55 - 65    Est. PA Systolic Pressure 80.44 (A)     Pericardial Effusion TRIVIAL     Mitral Valve Mobility NORMAL     Tricuspid Valve Regurgitation MODERATE (A)     Narrative    Date of Procedure: 08/27/2017        TEST DESCRIPTION   Technical Quality: This is a portable study performed at the patient's bedside. This is a technically challenging study.     Aorta: The aortic root is normal in size, measuring 2.2 cm at sinotubular junction and 2.9 cm at Sinuses of Valsalva. The proximal ascending aorta is normal in size, measuring 2.8 cm across.     Left Atrium: The left atrial volume index is severely enlarged, measuring 84.64 cc/m2.     Left Ventricle: The left ventricle is normal in size, with an end-diastolic diameter of 5.0 cm, and an end-systolic diameter of 3.0 cm. Septal wall thickness is mildly increased, with the septum measuring 1.1 cm and the posterior wall measuring 0.9 cm   across. Relative wall thickness was normal at 0.36, and the LV mass index was increased at 134.0 g/m2 consistent with eccentric left ventricular hypertrophy. There are no regional wall motion abnormalities. Left ventricular systolic  function appears   normal. Visually estimated ejection fraction is 60-65%. The LV Doppler derived stroke volume equals 142.0 ccs.         Right Atrium: The right atrium is moderately enlarged, measuring 5.1 cm in length and 4.4 cm in width in the apical view.     Right Ventricle: The right ventricle is mildly enlarged measuring 4.3 cm at the base in the apical right ventricle-focused view. Global right ventricular systolic function appears mildly depressed. Tricuspid annular plane systolic excursion (TAPSE) is   1.5 cm. Tissue Doppler-derived tricuspid annular peak systolic velocity (S prime) is 9.3 cm/s. The estimated PA systolic pressure is 80 mmHg.     Aortic Valve:  The aortic valve is mildly sclerotic with normal leaflet mobility. The peak velocity obtained across the aortic valve is 2.46 m/s, which translates to a peak gradient of 24 mmHg. The mean gradient is 13 mmHg. Using a left ventricular   outflow tract diameter of 2.0 cm, a left ventricular outflow tract velocity time integral of 46 cm, and a peak instantaneous transvalvular velocity time integral of 60 cm, the calculated aortic valve area is 2.36 cm2.     Mitral Valve:  The mitral valve is normal in structure with normal leaflet mobility. There is marked mitral annular calcification.     Tricuspid Valve:  The tricuspid valve is normal in structure with normal leaflet mobility. There is moderate tricuspid regurgitation.     Pulmonary Valve:  There is mild pulmonic regurgitation.     Pericardium: There is evidence of a trivial postero-lateral pericardial effusion.     IVC: IVC is normal in size and collapses > 50% with a sniff, suggesting normal right atrial pressure of 3 mmHg.     Intracavitary: There is no evidence of intracavity mass, thrombi, or vegetation.         CONCLUSIONS     1 - Severe left atrial enlargement.     2 - Moderate right atrial enlargement.     3 - Eccentric hypertrophy.     4 - Normal left ventricular systolic function (EF 60-65%).      5 - Right ventricular enlargement with mildly depressed systolic function.     6 - Moderate tricuspid regurgitation.     7 - Pulmonary hypertension. The estimated PA systolic pressure is 80 mmHg.     8 - Trivial pericardial effusion.             This document has been electronically    SIGNED BY: Ronald Kate MD On: 08/28/2017 08:48    and Transthoracic echo (TTE) complete (Cupid Only): No results found for this or any previous visit., EKG: reviewed.  Sinus yolanda, nonspecific ST changes, RVH, no ischemic changes and X-Ray: CXR: X-Ray Chest 1 View (CXR): No results found for this visit on 07/01/19. and X-Ray Chest PA and Lateral (CXR): No results found for this visit on 07/01/19.

## 2019-07-02 NOTE — ASSESSMENT & PLAN NOTE
Patient without symptoms of ACS, will trend q6h  Evaluated by Cardiology in the ED who did not suspect ACS, recommended treating underlying sepsis and trending troponin to peak

## 2019-07-03 PROCEDURE — 12000002 HC ACUTE/MED SURGE SEMI-PRIVATE ROOM: Mod: HCNC

## 2019-07-06 LAB
BACTERIA BLD CULT: NORMAL
BACTERIA BLD CULT: NORMAL

## 2019-07-22 NOTE — TELEPHONE ENCOUNTER
----- Message from Tommie Goodwin sent at 4/18/2017  3:30 PM CDT -----  Contact: Natalie [phone]  [facility]  drugs would like a call back to discuss medication calcitRIOL (ROCALTROL) 0.25 MCG Cap and isosorbide mononitrate (IMDUR) 30 MG 24 hr tablet , stated they will like to know if patient is still taking medication and if so she need refills .  Last time patient was taking medication was last year when she was discharge from hospital .       Please advise , Thanks  !   age(85 years old or older)

## 2024-08-13 NOTE — ED NOTES
"Pt initially alert and speaking stating "I don't want to lay on my back". Pt placed on Zoll pads. Pt then had blank stare, left eye deviation, non responsive, no pulse palpated. ED provided notified immediatly. Compressions started.   "
Called [facility] 's Office. Awaiting call back.   
Called [facility]. Screener was April Unique. Pt not eligible for organ donation due to age. Referral # [id number].   
Patient heart rate 36. No changes in [place]. BP 92/45. Called and spoke to critical care team    Will place patient on pacer pads and prepare for atropine administration   
Pt identifiers Beth Salcedo were checked and are correct  LOC: The patient is awake, alert, aware of environment with an appropriate affect. Oriented x4, speaking appropriately  APPEARANCE: Pt resting comfortably, in no acute distress, pt is clean and well groomed, clothing properly fastened  SKIN: Skin warm, dry and intact, normal skin turgor, moist mucus membranes  RESPIRATORY: Airway is open and patent, respirations are spontaneous, shortness of breath noted  Breath sounds clear to upper lung fields, crackles auscultated to lower lung fields   CARDIAC: Normal rate and rhythm, 1 +  peripheral edema noted, capillary refill < 3 seconds, bilateral radial pulses 2+  ABDOMEN:Abd distended, firm to touch  Bowel sounds present to all four quad of abd on auscultation  NEUROLOGIC: PERRL, facial expression is symmetrical, patient moving all extremities spontaneously, normal sensation in all extremities when touched with a finger.  Follows all commands appropriately  MUSCULOSKELETAL: No obvious deformities.      
Pt resting comfortably and independently repositioned in stretcher with bed locked in lowest position for safety. NAD and patient denies pain at this time. Respirations even and unlabored and visible chest rise noted. Patient offered bathroom assistance and denies need at this time. Pt instructed to call if assistance is needed. Pt on continuous cardiac, BP, and O2 monitoring. No needs at this time. Will continue to monitor. Call light within reach. Family at bedside.      
Pt's family at bedside with .   
Spoke to Dr. Delgado Critical care to clarify Vitamin K order.   Will administer as instructed  Ok to hold heparin per Dr. Delgado   
Bed/Stretcher in lowest position, wheels locked, appropriate side rails in place/Call bell, personal items and telephone in reach/Instruct patient to call for assistance before getting out of bed/chair/stretcher/Non-slip footwear applied when patient is off stretcher/[place] to call system/Physically safe environment - no spills, clutter or unnecessary equipment/Purposeful proactive rounding/Room/bathroom lighting operational, light cord in reach

## (undated) DEVICE — SET DECANTER MEDICHOICE

## (undated) DEVICE — SOL WATER STRL IRR 1000ML

## (undated) DEVICE — DRAPE STERI 32 X 50

## (undated) DEVICE — Device

## (undated) DEVICE — SHEILD & GARTERS FOX METAL EYE

## (undated) DEVICE — SUT 3-0 12-18IN SILK

## (undated) DEVICE — SPONGE GAUZE 16PLY 4X4

## (undated) DEVICE — GOWN SURGICAL X-LARGE

## (undated) DEVICE — CLIP MED TICALL

## (undated) DEVICE — SUT 2-0 VICRYL / SH (J417)

## (undated) DEVICE — SUT VICRYL 3-0 27 SH

## (undated) DEVICE — DRESSING TELFA STRL 4X3 LF

## (undated) DEVICE — HYDRODISSECTOR NUCLEUS 27GX7/8

## (undated) DEVICE — DRESSING TRANS 6X8 TEGADERM

## (undated) DEVICE — DRESSING TRANS 4X4 TEGADERM

## (undated) DEVICE — DRESSING TRANS 2X2 TEGADERM

## (undated) DEVICE — SOLUTION BSS PLUS

## (undated) DEVICE — SUT MONOCRYL 3-0 SH U/D

## (undated) DEVICE — COVER LIGHT HANDLE 80/CA

## (undated) DEVICE — GOWN SURG 2XL DISP TIE BACK

## (undated) DEVICE — SOL BETADINE 5%

## (undated) DEVICE — SYR ONLY LUER LOCK 20CC

## (undated) DEVICE — SOL NS 1000CC

## (undated) DEVICE — NDL FLTR 5MCRN BLNT TIP 18GX1

## (undated) DEVICE — SUT MCRYL PLUS 4-0 PS2 27IN

## (undated) DEVICE — DRESSING TELFA N ADH 3X8

## (undated) DEVICE — SUT LIGACLIP SMALL XTRA

## (undated) DEVICE — SOL 9P NACL IRR PIC IL

## (undated) DEVICE — SUT SILK 2-0 SH 18IN BLACK

## (undated) DEVICE — KIT GREY EYE

## (undated) DEVICE — STRIP MG FML-GLO .06 - ORDER F

## (undated) DEVICE — SEE MEDLINE ITEM 153688

## (undated) DEVICE — ELECTRODE REM PLYHSV RETURN 9

## (undated) DEVICE — BOOT SUTURE AID

## (undated) DEVICE — CARTRIDGE LENS D

## (undated) DEVICE — TRAY MINOR GEN SURG

## (undated) DEVICE — BLADE SURG CARBON STEEL SZ11

## (undated) DEVICE — APPLICATOR CHLORAPREP ORN 26ML

## (undated) DEVICE — LOOP VESSEL BLUE MAXI